# Patient Record
Sex: MALE | Race: WHITE | Employment: FULL TIME | URBAN - METROPOLITAN AREA
[De-identification: names, ages, dates, MRNs, and addresses within clinical notes are randomized per-mention and may not be internally consistent; named-entity substitution may affect disease eponyms.]

---

## 2018-10-12 ENCOUNTER — TRANSFERRED RECORDS (OUTPATIENT)
Dept: HEALTH INFORMATION MANAGEMENT | Facility: CLINIC | Age: 68
End: 2018-10-12

## 2018-10-16 PROBLEM — N18.30 CKD (CHRONIC KIDNEY DISEASE) STAGE 3, GFR 30-59 ML/MIN (H): Status: ACTIVE | Noted: 2018-10-16

## 2018-10-16 PROBLEM — I10 HYPERTENSION GOAL BP (BLOOD PRESSURE) < 140/90: Status: ACTIVE | Noted: 2018-10-16

## 2018-10-16 PROBLEM — C85.90 NON-HODGKIN'S LYMPHOMA (H): Status: ACTIVE | Noted: 2018-10-16

## 2018-10-16 PROBLEM — E11.9 TYPE 2 DIABETES MELLITUS WITHOUT COMPLICATION, WITHOUT LONG-TERM CURRENT USE OF INSULIN (H): Status: ACTIVE | Noted: 2018-10-16

## 2018-10-17 RX ORDER — ALBUTEROL SULFATE 90 UG/1
2 AEROSOL, METERED RESPIRATORY (INHALATION) 4 TIMES DAILY
COMMUNITY
End: 2018-12-04

## 2018-10-17 RX ORDER — EPINEPHRINE 1 MG/ML
INJECTION, SOLUTION INTRAMUSCULAR; SUBCUTANEOUS ONCE
COMMUNITY
End: 2023-04-11

## 2018-10-17 RX ORDER — SILDENAFIL CITRATE 20 MG/1
20 TABLET ORAL
COMMUNITY
End: 2023-08-24

## 2018-10-17 RX ORDER — PRAVASTATIN SODIUM 10 MG
10 TABLET ORAL DAILY
COMMUNITY
End: 2018-12-04

## 2018-12-02 NOTE — PROGRESS NOTES
"SUBJECTIVE:  César Denis is a 67 year old male who presents to the clinic today for a routine physical exam.    The patient's last physical was {NUMBERS 0-12:472577} {MONTHS/YEARS:400883} ago.     No results found for: CHOL  No results found for: HDL  No results found for: LDL  No results found for: TRIG  No results found for: CHOLHDLRATIO  The patient's last fasting lipid panel was done {NUMBERS 1-12:461308} {DAYS:347465} ago and the results {WDSNORMALABNORMALUNKNOWN:947026}***.  The patient has never had his cholesterol checked.***      The ASCVD Risk score (Aileen JC Jr, et al., 2013) failed to calculate for the following reasons:    The systolic blood pressure is missing    Cannot find a previous HDL lab    Cannot find a previous total cholesterol lab    The smoking status is invalid        The patient reports that he {WDSBPHX:212295}    The patient reports that he {does:202593::\"does not\"} take a daily aspirin.    No results found for: HCVAB  The patient reports that he {HAS:516462} been screened for Hepatitis C    (Screen all baby boomers once per CDC-- the generation born from 1945 through 1965)  He {Would:655395} like to have an Hepatitis C test today    No results found for: HIAGAB  The patient reports that he {HAS:982370} been screened for HIV   (Screen all 15 to 64 years old)  He {Would:042699} like to have an HIV test today      Immunization History   Administered Date(s) Administered     HepB-Adult 11/05/2012     Pneumo Conj 13-V (2010&after) 10/25/2017     TDAP Vaccine (Adacel) 07/18/2008     Zoster vaccine, live 09/10/2012     The patient's believes that his last tetanus shot was given {NUMBER:695570} year(s) ago.   The patient believes that he {HAS:099204} had a Shingrix in the past  The patient believes that he {HAS:857438} had a PPSV23 in the past.  The patient believes that he {HAS:207185} had a PCV13 in the past.  The patient believes that he {HAS:969973} had a seasonal flu vaccination this fall " "or winter.  The patient would like to have a {WDSVACCINATIONLIST:266937}      No results found for this or any previous visit.]   The patient {REPORTS/DENIES:411387} a family history of colon cancer.  The patient reports that he {HAS:251719} had a colonoscopy. His  last colonoscopy was in *** and he  report that is was {NORMAL/ABN/NOT EX:719763::\"normal\"}. The patient was told to have this repeated in {NUMBERS 1-10:88029} years.    The patient reports that he {DOES_WM:902894} performs a self testicular exam monthly.  His currently used contraception is ***. He is not*** interested in a vasectomy in the near future.  The patient {REPORTS/DENIES:409495} a family history of diabetes.  The patient {REPORTS/DENIES:724488} a family history of prostate cancer. After discussing the pros and cons of checking a PSA he  {Does / Does not:633071} want to have this test drawn today.   The patient reports that he eats or drinks {NUMBERS 1-12:044660} servings of dairy products per day. He {DOES_WM:595153} take {WDS CALCIUM DOSE:941297} {WDSFREQUENCYDAILY:332363}  The patient reports that he has dental appointments approximately every {NUMBERS 1-12:087444} months.  The patient reports that he  has an eye examination approximately every {NUMBERS .5 - 4.0:75023} year(s).    Do you currently smoke? {Yes/No:033865::\"Yes\"}  How many years have you smoked? ***   How many packs per day did you smoke on average? ***N/A***  (if more than 30 pack year history and the patient is age 55-80 consider ordering an annual low dose radiation lung CT to screen for cancer)  (Do not order if patient has quit more than 15 years ago or has a health condition that limits life expectancy or could not tolerate curative lung surgery)  Are you interested having a lung CT to screen for lung cancer? {YES, NO, NA:441917}    If the patient has smoked more that 100 cigarettes, has the patient had an imaging study (US or CT) for an AAA between the ages of 65 and 75? " "{YES, NO, NA:469760}    *** if 65 or older order fall risk assessment and complete order  ***discuss smoking cessation if appropriate--->add smart phrase to end          Patient Active Problem List   Diagnosis     Type 2 diabetes mellitus without complication, without long-term current use of insulin (H)     Hypertension goal BP (blood pressure) < 140/90     Non-Hodgkin's lymphoma (H)     CKD (chronic kidney disease) stage 3, GFR 30-59 ml/min (H)       No past surgical history on file.    No family history on file.    Social History     Social History     Marital status:      Spouse name: N/A     Number of children: N/A     Years of education: N/A     Occupational History     Not on file.     Social History Main Topics     Smoking status: Not on file     Smokeless tobacco: Not on file     Alcohol use Not on file     Drug use: Not on file     Sexual activity: Not on file     Other Topics Concern     Not on file     Social History Narrative       Current Outpatient Prescriptions   Medication Sig Dispense Refill     albuterol (PROAIR HFA/PROVENTIL HFA/VENTOLIN HFA) 108 (90 Base) MCG/ACT inhaler Inhale 2 puffs into the lungs 4 times daily       EPINEPHrine, Anaphylaxis, (ADRENALIN) 1 MG/ML injection Inject Subcutaneous once       LISINOPRIL PO Take 10 mg by mouth daily       METFORMIN HCL PO Take 1,000 mg by mouth 2 times daily (with meals) Take 1/2 tablet twice daily       pravastatin (PRAVACHOL) 10 MG tablet Take 10 mg by mouth daily       sildenafil (REVATIO) 20 MG tablet Take 20 mg by mouth 3-5 tablets once daily as needed 30-60 mins before needed for ED         Review Of Systems  Skin: {Skin:100:a:\"negative\"}  Eyes: {Eyes:200:a:\"negative\"}  Ears/Nose/Throat: {ENT:300:a:\"negative\"}  Respiratory: {Resp:400::\"negative\"}  Cardiovascular: {CV:500::\"negative\"}  Gastrointestinal: {GI:600::\"negative\"}  Genitourinary: {:700::\"negative\"}  Musculoskeletal: {Musc:800::\"negative\"}  Neurologic: " "{Neur:900::\"negative\"}  Psychiatric: {Psych:1000::\"negative\"}  Hematologic/Lymphatic/Immunologic: {Hem:1100::\"negative\"}  Endocrine: {Endo:1200::\"negative\"}    PHYSICAL EXAMINATION:  There were no vitals taken for this visit.  General appearance - {appearance:50::\"healthy\",\"alert\",\"no distress\"}  Skin - {skin:101::\"Skin color, texture, turgor normal. No rashes or lesions.\"}  Head - {head:301::\"Normocephalic. No masses, lesions, tenderness or abnormalities\"}  Eyes - {eyes:201::\"conjunctivae/corneas clear. PERRL, EOM's intact. Fundi benign\"}  Ears - {ears:322::\"External ears normal. Canals clear. TM's normal.\"}  Nose/Sinuses - {nose:302::\"Nares normal. Septum midline. Mucosa normal. No drainage or sinus tenderness.\"}  Oropharynx - {O/P:303::\"Lips, mucosa, and tongue normal. Teeth and gums normal.\"}  Neck - {neck:304::\"Neck supple. No adenopathy. Thyroid symmetric, normal size,\"}  Lungs - {lungs:401::\"Percussion normal. Good diaphragmatic excursion. Lungs clear\"}  Heart - {heart:501::\"PMI normal. No lifts, heaves, or thrills. RRR. No murmurs, clicks gallops or rub\"}  Abdomen - {abdomen:601::\"Abdomen soft, non-tender. BS normal. No masses, organomegaly\"}  Extremities - {extremities:802::\"Extremities normal. No deformities, edema, or skin discoloration.\"}  Musculoskeletal - {musculoskeletal:803::\"Spine ROM normal. Muscular strength intact.\"}  Peripheral pulses - {pulses:502::\"radial=4/4, femoral=4/4, popliteal=4/4, dorsalis pedis=4/4,\"}  Neuro - {neuro:901::\"Gait normal. Reflexes normal and symmetric. Sensation grossly WNL.\"}  Genitalia - {genitalia:708::\"Penis normal. No urethral discharge. Scrotum normal to palpation. No hernia.\"}  Rectal - {rectal:613::\"Rectal negative. Prostate palpation negative.  No rectal masses or abnormalities\"}      No results found for any previous visit.    ASSESSMENT:    ICD-10-CM    1. Medicare annual wellness visit, subsequent Z00.00    2. Screen for colon cancer Z12.11    3. Need for " hepatitis C screening test Z11.59    4. Screening for diabetic peripheral neuropathy Z13.89 FOOT EXAM  NO CHARGE [38059.114]       Well-Adult Physical Exam.  Health Maintenance Due   Topic Date Due     FOOT EXAM Q1 YEAR  12/22/1951     EYE EXAM Q1 YEAR  12/22/1951     CREATININE Q1 YEAR  12/22/1952     TSH W/ FREE T4 REFLEX Q2 YEAR  12/22/1952     BMP Q1 YR  12/22/1952     HEMOGLOBIN Q1 YR  12/22/1952     A1C Q6 MO  12/22/1952     LIPID MONITORING Q1 YEAR  12/22/1952     MICROALBUMIN Q1 YEAR  12/22/1952     PHQ-2 Q1 YR  12/22/1963     HEPATITIS C SCREENING  12/22/1969     COLON CANCER SCREEN (SYSTEM ASSIGNED)  12/22/2001     ADVANCE DIRECTIVE PLANNING Q5 YRS  12/22/2006     FALL RISK ASSESSMENT  12/22/2016     AORTIC ANEURYSM SCREENING (SYSTEM ASSIGNED)  12/22/2016     TETANUS IMMUNIZATION (SYSTEM ASSIGNED)  07/18/2018     INFLUENZA VACCINE (1) 09/01/2018     PNEUMOCOCCAL (2 of 2 - PPSV23) 10/25/2018     Health Maintenance   Topic Date Due     FOOT EXAM Q1 YEAR  12/22/1951     EYE EXAM Q1 YEAR  12/22/1952     CREATININE Q1 YEAR  12/22/1952     TSH W/ FREE T4 REFLEX Q2 YEAR  12/22/1952     BMP Q1 YR  12/22/1952     HEMOGLOBIN Q1 YR  12/22/1952     A1C Q6 MO  12/22/1952     LIPID MONITORING Q1 YEAR  12/22/1952     MICROALBUMIN Q1 YEAR  12/22/1952     PHQ-2 Q1 YR  12/22/1963     HEPATITIS C SCREENING  12/22/1969     COLON CANCER SCREEN (SYSTEM ASSIGNED)  12/22/2001     ADVANCE DIRECTIVE PLANNING Q5 YRS  12/22/2006     FALL RISK ASSESSMENT  12/22/2016     AORTIC ANEURYSM SCREENING (SYSTEM ASSIGNED)  12/22/2016     TETANUS IMMUNIZATION (SYSTEM ASSIGNED)  07/18/2018     INFLUENZA VACCINE (1) 09/01/2018     PNEUMOCOCCAL (2 of 2 - PPSV23) 10/25/2018         HEALTH CARE MAINTENENCE: The recommended screening tests and vaccinatons for this patient have been discussed as above.  The appropriate tests and vaccinations  have been ordered or declined by the patient. Please see the orders in EPIC.The patient specifically declines:  "n/a ***    Immunization Status:  {:5306::\"up to date and documented\"} except for ***    Patient Active Problem List   Diagnosis     Type 2 diabetes mellitus without complication, without long-term current use of insulin (H)     Hypertension goal BP (blood pressure) < 140/90     Non-Hodgkin's lymphoma (H)     CKD (chronic kidney disease) stage 3, GFR 30-59 ml/min (H)    ***    ATP III Guidelines  ICSI Preventive Guidelines    PLAN:   {PHYSICAL EXAM GUIDANCE PLANS:194602::\"Check a fasting lipid profile\",\"Discussed calcium intake, vitamins and supplements. Recommended 1000*** mg of calcium daily\",\"Sunscreen use was recommended especially in the area of tatoos\",\"Recommended dental exams every 6 months\",\"Follow up in 1 year for the next preventative medical visit\"}    The patients chronic medication was filled for 6***12 *** months.  The patient reported that he did not need any refills at this time.***    There is no height or weight on file to calculate BMI.    {Obesity Action Plan -- Delete if BMA < 25:019321}  {Tobacco Cessation -- Delete if patient is a non-smoker:070686}      "

## 2018-12-03 ENCOUNTER — OFFICE VISIT (OUTPATIENT)
Dept: FAMILY MEDICINE | Facility: CLINIC | Age: 68
End: 2018-12-03
Payer: COMMERCIAL

## 2018-12-03 VITALS
RESPIRATION RATE: 20 BRPM | OXYGEN SATURATION: 97 % | SYSTOLIC BLOOD PRESSURE: 115 MMHG | WEIGHT: 184 LBS | HEART RATE: 78 BPM | TEMPERATURE: 98.2 F | BODY MASS INDEX: 27.89 KG/M2 | HEIGHT: 68 IN | DIASTOLIC BLOOD PRESSURE: 72 MMHG

## 2018-12-03 DIAGNOSIS — N18.30 CKD (CHRONIC KIDNEY DISEASE) STAGE 3, GFR 30-59 ML/MIN (H): ICD-10-CM

## 2018-12-03 DIAGNOSIS — Z00.00 MEDICARE ANNUAL WELLNESS VISIT, SUBSEQUENT: Primary | ICD-10-CM

## 2018-12-03 DIAGNOSIS — I10 HYPERTENSION GOAL BP (BLOOD PRESSURE) < 140/90: ICD-10-CM

## 2018-12-03 DIAGNOSIS — Z11.59 NEED FOR HEPATITIS C SCREENING TEST: ICD-10-CM

## 2018-12-03 DIAGNOSIS — Z12.11 SCREEN FOR COLON CANCER: ICD-10-CM

## 2018-12-03 DIAGNOSIS — N52.8 OTHER MALE ERECTILE DYSFUNCTION: ICD-10-CM

## 2018-12-03 DIAGNOSIS — C83.98 NON-FOLLICULAR LYMPHOMA OF LYMPH NODES OF MULTIPLE REGIONS, UNSPECIFIED NON-FOLLICULAR LYMPHOMA TYPE (H): ICD-10-CM

## 2018-12-03 DIAGNOSIS — Z13.89 SCREENING FOR DIABETIC PERIPHERAL NEUROPATHY: ICD-10-CM

## 2018-12-03 DIAGNOSIS — E11.9 TYPE 2 DIABETES MELLITUS WITHOUT COMPLICATION, WITHOUT LONG-TERM CURRENT USE OF INSULIN (H): ICD-10-CM

## 2018-12-03 LAB
ALBUMIN SERPL-MCNC: 4.2 G/DL (ref 3.4–5)
ALP SERPL-CCNC: 72 U/L (ref 40–150)
ALT SERPL W P-5'-P-CCNC: 31 U/L (ref 0–70)
ANION GAP SERPL CALCULATED.3IONS-SCNC: 9 MMOL/L (ref 3–14)
AST SERPL W P-5'-P-CCNC: 20 U/L (ref 0–45)
BILIRUB SERPL-MCNC: 0.5 MG/DL (ref 0.2–1.3)
BUN SERPL-MCNC: 17 MG/DL (ref 7–30)
CALCIUM SERPL-MCNC: 9.3 MG/DL (ref 8.5–10.1)
CHLORIDE SERPL-SCNC: 104 MMOL/L (ref 94–109)
CHOLEST SERPL-MCNC: 222 MG/DL
CO2 SERPL-SCNC: 25 MMOL/L (ref 20–32)
CREAT SERPL-MCNC: 1.31 MG/DL (ref 0.66–1.25)
CREAT UR-MCNC: 105 MG/DL
GFR SERPL CREATININE-BSD FRML MDRD: 55 ML/MIN/1.7M2
GLUCOSE SERPL-MCNC: 106 MG/DL (ref 70–99)
HBA1C MFR BLD: 5.9 % (ref 0–5.6)
HCV AB SERPL QL IA: NONREACTIVE
HDLC SERPL-MCNC: 42 MG/DL
HGB BLD-MCNC: 16.7 G/DL (ref 13.3–17.7)
LDLC SERPL CALC-MCNC: 138 MG/DL
MICROALBUMIN UR-MCNC: <5 MG/L
MICROALBUMIN/CREAT UR: NORMAL MG/G CR (ref 0–17)
NONHDLC SERPL-MCNC: 180 MG/DL
POTASSIUM SERPL-SCNC: 4.1 MMOL/L (ref 3.4–5.3)
PROT SERPL-MCNC: 8.5 G/DL (ref 6.8–8.8)
PTH-INTACT SERPL-MCNC: 24 PG/ML (ref 18–80)
SODIUM SERPL-SCNC: 138 MMOL/L (ref 133–144)
TRIGL SERPL-MCNC: 210 MG/DL
TSH SERPL DL<=0.005 MIU/L-ACNC: 1.69 MU/L (ref 0.4–4)

## 2018-12-03 PROCEDURE — 83970 ASSAY OF PARATHORMONE: CPT | Performed by: FAMILY MEDICINE

## 2018-12-03 PROCEDURE — 83036 HEMOGLOBIN GLYCOSYLATED A1C: CPT | Performed by: FAMILY MEDICINE

## 2018-12-03 PROCEDURE — 80061 LIPID PANEL: CPT | Performed by: FAMILY MEDICINE

## 2018-12-03 PROCEDURE — 85018 HEMOGLOBIN: CPT | Performed by: FAMILY MEDICINE

## 2018-12-03 PROCEDURE — 80053 COMPREHEN METABOLIC PANEL: CPT | Performed by: FAMILY MEDICINE

## 2018-12-03 PROCEDURE — 86803 HEPATITIS C AB TEST: CPT | Performed by: FAMILY MEDICINE

## 2018-12-03 PROCEDURE — 36415 COLL VENOUS BLD VENIPUNCTURE: CPT | Performed by: FAMILY MEDICINE

## 2018-12-03 PROCEDURE — 99397 PER PM REEVAL EST PAT 65+ YR: CPT | Performed by: FAMILY MEDICINE

## 2018-12-03 PROCEDURE — 82043 UR ALBUMIN QUANTITATIVE: CPT | Performed by: FAMILY MEDICINE

## 2018-12-03 PROCEDURE — 84443 ASSAY THYROID STIM HORMONE: CPT | Performed by: FAMILY MEDICINE

## 2018-12-03 ASSESSMENT — ENCOUNTER SYMPTOMS
ARTHRALGIAS: 1
SHORTNESS OF BREATH: 0
DYSURIA: 0
FEVER: 0
HEADACHES: 0
JOINT SWELLING: 0
NERVOUS/ANXIOUS: 0
WEAKNESS: 0
SORE THROAT: 0
HEMATOCHEZIA: 0
NAUSEA: 0
EYE PAIN: 0
COUGH: 0
HEARTBURN: 1
PARESTHESIAS: 0
ABDOMINAL PAIN: 0
MYALGIAS: 0
DIZZINESS: 0
HEMATURIA: 0
CONSTIPATION: 0
FREQUENCY: 0
CHILLS: 0
DIARRHEA: 0
PALPITATIONS: 0

## 2018-12-03 ASSESSMENT — ACTIVITIES OF DAILY LIVING (ADL): CURRENT_FUNCTION: NO ASSISTANCE NEEDED

## 2018-12-03 ASSESSMENT — PAIN SCALES - GENERAL: PAINLEVEL: NO PAIN (0)

## 2018-12-03 NOTE — MR AVS SNAPSHOT
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   o   Discuss preventive care.    o   Review your medicines if your doctor has prescribed any.    Talk with your health care provider about whether you should have a test to screen for prostate cancer (PSA).    Every 3 years, have a diabetes test (fasting glucose). If you are at risk for diabetes, you should have this test more often.    Every 5 years, have a cholesterol test. Have this test more often if you are at risk for high cholesterol or heart disease.     Every 10 years, have a colonoscopy. Or, have a yearly FIT test (stool test). These exams will check for colon cancer.    Talk to with your health care provider about screening for Abdominal Aortic Aneurysm if you have a family history of AAA or have a history of smoking.  Shots:     Get a flu shot each year.     Get a tetanus shot every 10 years.     Talk to your doctor about your pneumonia vaccines. There are now two you should receive - Pneumovax (PPSV 23) and  "Prevnar (PCV 13).    Talk to your pharmacist about a shingles vaccine.     Talk to your doctor about the hepatitis B vaccine.  Nutrition:     Eat at least 5 servings of fruits and vegetables each day.     Eat whole-grain bread, whole-wheat pasta and brown rice instead of white grains and rice.     Get adequate Calcium and Vitamin D.   Lifestyle    Exercise for at least 150 minutes a week (30 minutes a day, 5 days a week). This will help you control your weight and prevent disease.     Limit alcohol to one drink per day.     No smoking.     Wear sunscreen to prevent skin cancer.     See your dentist every six months for an exam and cleaning.     See your eye doctor every 1 to 2 years to screen for conditions such as glaucoma, macular degeneration and cataracts.          Follow-ups after your visit        Follow-up notes from your care team     Return in about 3 months (around 3/3/2019) for Diabetes Recheck and vaccine update.      Who to contact     If you have questions or need follow up information about today's clinic visit or your schedule please contact [facility] directly at [phone].  Normal or non-critical lab and imaging results will be communicated to you by [employer]BO[employer], letter or phone within 4 business days after the clinic has received the results. If you do not hear from us within 7 days, please contact the clinic through [employer] or phone. If you have a critical or abnormal lab result, we will notify you by phone as soon as possible.  Submit refill requests through [employer] or call your pharmacy and they will forward the refill request to us. Please allow 3 business days for your refill to be completed.          Additional Information About Your Visit        Data [employer] Information     Two Tap lets you send messages to your doctor, view your test results, renew your prescriptions, schedule appointments and more. To sign up, go to [email or web address] . Click on \"Log in\" on the left side of " "the screen, which will take you to the Welcome page. Then click on \"Sign up Now\" on the right side of the page.     You will be asked to enter the access code listed below, as well as some personal information. Please follow the directions to create your username and password.     Your access code is: [email or web address]  Expires: 3/3/2019  9:43 AM     Your access code will  in 90 days. If you need help or a new code, please call your [place] clinic or [phone].        Care EveryWhere ID     This is your Care EveryWhere ID. This could be used by other organizations to access your [place] medical records  [id number]        Your Vitals Were     Pulse Temperature Respirations Height Pulse Oximetry BMI (Body Mass Index)    78 98.2  F (36.8  C) (Oral) 20 5' 7.5\" (1.715 m) 97% 28.39 kg/m2       Blood Pressure from Last 3 Encounters:   18 115/72    Weight from Last 3 Encounters:   18 184 lb (83.5 kg)              We Performed the Following     Albumin Random Urine Quantitative with Creat Ratio     Comprehensive metabolic panel     HEMOGLOBIN A1C     Hemoglobin     Hepatitis C Screen Reflex to HCV RNA Quant and Genotype     JUST IN CASE     Lipid panel reflex to direct LDL Fasting     Parathyroid Hormone Intact     TSH WITH FREE T4 REFLEX        Primary Care Provider Office Phone # Fax #    [facility] [phone] [address] [facility] [address]        Equal Access to Services     JOSIAH CHAN AH: Hadii yazmin muñozo Soisaíasali, waaxda luqadaha, qaybta kaalmada aderosayada, genet daley. So [facility] [phone].    ATENCIÓN: Si habla español, tiene a ybarra disposición servicios gratuitos de asistencia lingüística. Llame al [phone].    We comply with applicable federal civil rights laws and [place] laws. We do not discriminate on the basis of race, color, national origin, age, disability, sex, sexual orientation, or gender identity.          "   Thank you!     Thank you for choosing [facility]  for your care. Our goal is always to provide you with excellent care. Hearing back from our patients is one way we can continue to improve our services. Please take a few minutes to complete the written survey that you may receive in the mail after your visit with us. Thank you!             Your Updated Medication List - Protect others around you: Learn how to safely use, store and throw away your medicines at www.disposemymeds.org.          This list is accurate as of 12/3/18  9:45 AM.  Always use your most recent med list.                   Brand Name Dispense Instructions for use Diagnosis    albuterol 108 (90 Base) MCG/ACT inhaler    PROAIR HFA/PROVENTIL HFA/VENTOLIN HFA     Inhale 2 puffs into the lungs 4 times daily        EPINEPHrine (Anaphylaxis) 1 MG/ML injection    ADRENALIN     Inject Subcutaneous once        LISINOPRIL PO      Take 10 mg by mouth daily        METFORMIN HCL PO      Take 1,000 mg by mouth 2 times daily (with meals) Take 1/2 tablet twice daily        pravastatin 10 MG tablet    PRAVACHOL     Take 10 mg by mouth daily        sildenafil 20 MG tablet    REVATIO     Take 20 mg by mouth 3-5 tablets once daily as needed 30-60 mins before needed for ED

## 2018-12-04 ENCOUNTER — MYC REFILL (OUTPATIENT)
Dept: FAMILY MEDICINE | Facility: CLINIC | Age: 68
End: 2018-12-04

## 2018-12-04 ENCOUNTER — MYC MEDICAL ADVICE (OUTPATIENT)
Dept: FAMILY MEDICINE | Facility: CLINIC | Age: 68
End: 2018-12-04

## 2018-12-04 DIAGNOSIS — E11.9 TYPE 2 DIABETES MELLITUS WITHOUT COMPLICATION, WITHOUT LONG-TERM CURRENT USE OF INSULIN (H): ICD-10-CM

## 2018-12-04 DIAGNOSIS — R06.2 WHEEZING: Primary | ICD-10-CM

## 2018-12-04 DIAGNOSIS — E78.00 ELEVATED LDL CHOLESTEROL LEVEL: ICD-10-CM

## 2018-12-04 DIAGNOSIS — N18.30 CKD (CHRONIC KIDNEY DISEASE) STAGE 3, GFR 30-59 ML/MIN (H): ICD-10-CM

## 2018-12-04 DIAGNOSIS — R06.2 WHEEZING: ICD-10-CM

## 2018-12-04 DIAGNOSIS — I10 HYPERTENSION GOAL BP (BLOOD PRESSURE) < 140/90: ICD-10-CM

## 2018-12-04 RX ORDER — ALBUTEROL SULFATE 90 UG/1
2 AEROSOL, METERED RESPIRATORY (INHALATION) 4 TIMES DAILY
Qty: 3 INHALER | Refills: 3 | Status: SHIPPED | OUTPATIENT
Start: 2018-12-04 | End: 2020-02-27

## 2018-12-04 RX ORDER — LISINOPRIL 10 MG/1
10 TABLET ORAL DAILY
Qty: 90 TABLET | Refills: 1 | Status: CANCELLED | OUTPATIENT
Start: 2018-12-04

## 2018-12-04 RX ORDER — ALBUTEROL SULFATE 90 UG/1
2 AEROSOL, METERED RESPIRATORY (INHALATION) 4 TIMES DAILY
Qty: 3 INHALER | Refills: 3 | Status: CANCELLED | OUTPATIENT
Start: 2018-12-04

## 2018-12-04 RX ORDER — LISINOPRIL 10 MG/1
10 TABLET ORAL DAILY
Qty: 90 TABLET | Refills: 1 | Status: SHIPPED | OUTPATIENT
Start: 2018-12-04 | End: 2019-06-14

## 2018-12-04 RX ORDER — METFORMIN HCL 500 MG
500 TABLET, EXTENDED RELEASE 24 HR ORAL
Qty: 90 TABLET | Refills: 1 | Status: SHIPPED | OUTPATIENT
Start: 2018-12-04 | End: 2019-07-25

## 2018-12-04 RX ORDER — PRAVASTATIN SODIUM 20 MG
20 TABLET ORAL AT BEDTIME
Qty: 90 TABLET | Refills: 3 | Status: SHIPPED | OUTPATIENT
Start: 2018-12-04 | End: 2019-12-16

## 2018-12-04 RX ORDER — PRAVASTATIN SODIUM 20 MG
20 TABLET ORAL AT BEDTIME
Qty: 90 TABLET | Refills: 3 | Status: CANCELLED | OUTPATIENT
Start: 2018-12-04

## 2018-12-04 RX ORDER — METFORMIN HCL 500 MG
500 TABLET, EXTENDED RELEASE 24 HR ORAL
Qty: 90 TABLET | Refills: 1 | Status: CANCELLED | OUTPATIENT
Start: 2018-12-04

## 2018-12-04 NOTE — PROGRESS NOTES
César,  I have reviewed the results of the laboratory tests that we recently ordered. All of the lab work performed was normal or considered normal for you except the cholesterol. Your LDL is higher than I would like it and your triglycerides are a little high. If you are willing to try a higher dose of the pravachol I would recommend that. Let me know via [email or web address] what pharmacy I can call that into. I would recommend(ed) a 3 month(s) follow up for diabetes.  Sincerely,   Ronnie Mckeon

## 2018-12-04 NOTE — TELEPHONE ENCOUNTER
Patient seen in the last 30 days and medications were not refilled.  RN pended medications as patient ordered below.    Also, patient is responding to providers question about increasing pravachol dose:    César,  I have reviewed the results of the laboratory tests that we recently ordered. All of the lab work performed was normal or considered normal for you except the cholesterol. Your LDL is higher than I would like it and your triglycerides are a little high. If you are willing to try a higher dose of the pravachol I would recommend that. Let me know via [employer] what pharmacy I can call that into. I would recommend(ed) a 3 month(s) follow up for diabetes.  Sincerely,   Ronnie Mckeon          Routing to provider to advise.  Katerin Avina, AMRITN RN

## 2018-12-04 NOTE — TELEPHONE ENCOUNTER
Message from MyChart:  Original authorizing provider: Ronnie Mckeon MD    César Denis would like a refill of the following medications:  lisinopril (PRINIVIL/ZESTRIL) 10 MG tablet [Ronnie Mckeon MD]  metFORMIN (GLUCOPHAGE-XR) 500 MG 24 hr tablet [Ronnie Mckeon MD]  albuterol (PROAIR HFA/PROVENTIL HFA/VENTOLIN HFA) 108 (90 Base) MCG/ACT inhaler [Ronnie Mckeon MD]  pravastatin (PRAVACHOL) 20 MG tablet [Ronnie Mckeon MD]    Preferred pharmacy: [facility] [address] [place] [address] [address] [place] [address] [place]    Comment:

## 2019-01-29 ENCOUNTER — TELEPHONE (OUTPATIENT)
Dept: FAMILY MEDICINE | Facility: CLINIC | Age: 69
End: 2019-01-29

## 2019-01-29 NOTE — TELEPHONE ENCOUNTER
Panel Management Review      Patient has the following on his problem list:     Diabetes    ASA: Failed    Last A1C  Lab Results   Component Value Date    A1C 5.9 12/03/2018     A1C tested: Passed    Last LDL:    Lab Results   Component Value Date    CHOL 222 12/03/2018     Lab Results   Component Value Date    HDL 42 12/03/2018     Lab Results   Component Value Date     12/03/2018     Lab Results   Component Value Date    TRIG 210 12/03/2018     No results found for: CHOLHDLRATIO  Lab Results   Component Value Date    NHDL 180 12/03/2018       Is the patient on a Statin? YES             Is the patient on Aspirin? NO    Medications     HMG CoA Reductase Inhibitors    pravastatin (PRAVACHOL) 20 MG tablet          Last three blood pressure readings:  BP Readings from Last 3 Encounters:   12/03/18 115/72       Date of last diabetes office visit: unsure, physical 12/3/18     Tobacco History:     History   Smoking Status     Never Smoker   Smokeless Tobacco     Never Used           Composite cancer screening  Chart review shows that this patient is due/due soon for the following Colonoscopy  Summary:    Patient is due/failing the following:   Diabetic check and COLONOSCOPY    Action needed:   Per last office visit os to follow up for Diabetic check 3/3/19 will dicuss Colonoscopy at that time    Type of outreach:    none    Questions for provider review:    None                                                                                                                                    Carmela Thacker cma       Chart routed to closed .

## 2019-04-08 ENCOUNTER — OFFICE VISIT (OUTPATIENT)
Dept: FAMILY MEDICINE | Facility: CLINIC | Age: 69
End: 2019-04-08
Payer: COMMERCIAL

## 2019-04-08 VITALS
TEMPERATURE: 98.6 F | OXYGEN SATURATION: 96 % | BODY MASS INDEX: 29.66 KG/M2 | HEIGHT: 67 IN | SYSTOLIC BLOOD PRESSURE: 127 MMHG | WEIGHT: 189 LBS | HEART RATE: 89 BPM | RESPIRATION RATE: 18 BRPM | DIASTOLIC BLOOD PRESSURE: 78 MMHG

## 2019-04-08 DIAGNOSIS — Z13.89 SCREENING FOR DIABETIC PERIPHERAL NEUROPATHY: Primary | ICD-10-CM

## 2019-04-08 DIAGNOSIS — E11.9 TYPE 2 DIABETES MELLITUS WITHOUT COMPLICATION, WITHOUT LONG-TERM CURRENT USE OF INSULIN (H): ICD-10-CM

## 2019-04-08 DIAGNOSIS — I10 HYPERTENSION GOAL BP (BLOOD PRESSURE) < 140/90: ICD-10-CM

## 2019-04-08 DIAGNOSIS — Z23 NEED FOR VACCINATION: ICD-10-CM

## 2019-04-08 DIAGNOSIS — E78.00 ELEVATED LDL CHOLESTEROL LEVEL: ICD-10-CM

## 2019-04-08 PROCEDURE — 90472 IMMUNIZATION ADMIN EACH ADD: CPT | Performed by: FAMILY MEDICINE

## 2019-04-08 PROCEDURE — 90471 IMMUNIZATION ADMIN: CPT | Performed by: FAMILY MEDICINE

## 2019-04-08 PROCEDURE — 90746 HEPB VACCINE 3 DOSE ADULT IM: CPT | Performed by: FAMILY MEDICINE

## 2019-04-08 PROCEDURE — 90714 TD VACC NO PRESV 7 YRS+ IM: CPT | Performed by: FAMILY MEDICINE

## 2019-04-08 PROCEDURE — 99214 OFFICE O/P EST MOD 30 MIN: CPT | Mod: 25 | Performed by: FAMILY MEDICINE

## 2019-04-08 PROCEDURE — 99207 C FOOT EXAM  NO CHARGE: CPT | Performed by: FAMILY MEDICINE

## 2019-04-08 RX ORDER — CETIRIZINE HYDROCHLORIDE 10 MG/1
10 TABLET, CHEWABLE ORAL DAILY
COMMUNITY

## 2019-04-08 RX ORDER — FLUTICASONE PROPIONATE 50 MCG
1 SPRAY, SUSPENSION (ML) NASAL DAILY
COMMUNITY

## 2019-04-08 ASSESSMENT — MIFFLIN-ST. JEOR: SCORE: 1590.93

## 2019-04-08 ASSESSMENT — PAIN SCALES - GENERAL: PAINLEVEL: NO PAIN (0)

## 2019-04-08 NOTE — NURSING NOTE
"Chief Complaint   Patient presents with     Diabetes     Health Maintenance     adv dir, PHQ-2       Initial /78   Pulse 89   Temp 98.6  F (37  C) (Oral)   Resp 18   Ht 1.702 m (5' 7\")   Wt 85.7 kg (189 lb)   SpO2 96%   BMI 29.60 kg/m   Estimated body mass index is 29.6 kg/m  as calculated from the following:    Height as of this encounter: 1.702 m (5' 7\").    Weight as of this encounter: 85.7 kg (189 lb).  Medication Reconciliation: complete  Carmela Thacker, REGINA  "

## 2019-04-08 NOTE — PATIENT INSTRUCTIONS
Please schedule and eye exam with you eye care provider and continue to do so every 1 year(s). Dr Jung is here at the clinic        Please schedule a future laboratory appointment(s) and be sure to have fasted for 10 hours prior to arrival

## 2019-04-08 NOTE — PROGRESS NOTES
[place] diabetes resourses:  [email or web address]        To access diabetes educational materials with in EPIC use <dot>TAMMIE      Put this in AFTER VISIT SUMMARY if needed for the patient to access information online [email or web address]      SUBJECTIVE:  César Denis is a 68 year old male who presents today for a follow up appointment for management of DIABETES MELLITUS.    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, unspecified non-follicular lymphoma type (H) 12/03/2018     Priority: Medium     Other male erectile dysfunction 12/03/2018     Priority: Medium     Type 2 diabetes mellitus without complication, without long-term current use of insulin (H) 10/16/2018     Priority: Medium     Hypertension goal BP (blood pressure) < 140/90 10/16/2018     Priority: Medium     Non-Hodgkin's lymphoma (H) 10/16/2018     Priority: Medium     CKD (chronic kidney disease) stage 3, GFR 30-59 ml/min (H) 10/16/2018     Priority: Medium          The patient checks his blood sugar as follows: 1 times a month(s), once daily.   Results as follows: fasting glucose- 115-120    The patient reports that he IS taking the medication as prescribed. He denies side effects of medication.    ----------------------------------------------------------------------------------------------------------------------------------------    BP Readings from Last 3 Encounters:   04/08/19 127/78   12/03/18 115/72     The patient reports that he IS NOT currently smoking.  History   Smoking Status     Never Smoker   Smokeless Tobacco     Never Used         The 10-year ASCVD risk score (Aileen GREENE Jr., et al., 2013) is: 34.2%    Values used to calculate the score:      Age: 68 years      Sex: Male      Is Non- : No      Diabetic: Yes      Tobacco smoker: No      Systolic  Blood Pressure: 127 mmHg      Is BP treated: Yes      HDL Cholesterol: 42 mg/dL      Total Cholesterol: 222 mg/dL        Current Outpatient Medications   Medication Sig Dispense Refill     albuterol (PROAIR HFA/PROVENTIL HFA/VENTOLIN HFA) 108 (90 Base) MCG/ACT inhaler Inhale 2 puffs into the lungs 4 times daily 3 Inhaler 3     cetirizine (ZYRTEC) 10 MG CHEW Take 10 mg by mouth daily       EPINEPHrine, Anaphylaxis, (ADRENALIN) 1 MG/ML injection Inject Subcutaneous once       fluticasone (FLONASE) 50 MCG/ACT nasal spray Spray 1 spray into both nostrils daily       lisinopril (PRINIVIL/ZESTRIL) 10 MG tablet Take 1 tablet (10 mg) by mouth daily 90 tablet 1     metFORMIN (GLUCOPHAGE-XR) 500 MG 24 hr tablet Take 1 tablet (500 mg) by mouth daily (with dinner) 90 tablet 1     METFORMIN HCL PO Take 1,000 mg by mouth 2 times daily (with meals) Take 1/2 tablet twice daily       pravastatin (PRAVACHOL) 20 MG tablet Take 1 tablet (20 mg) by mouth At Bedtime 90 tablet 3     sildenafil (REVATIO) 20 MG tablet Take 20 mg by mouth 3-5 tablets once daily as needed 30-60 mins before needed for ED         The patient reports that he IS taking a statin.   (Reminder all diabetics with a ASCVD risk greater or equal to 7.5% should be on a high intensity statin, otherwise on a moderate intensity statin)      The patient reports that he IS not taking aspirin as he is allergic to it.       The patient reports that he IS doing a self foot exam daily.  The patient reports that he does exercise in the form of walking the dog 7 times a week.   The patient reports that he IS following the recommended diabetic diet. He  would give himself a C grade on his diet.  The patient reports that his last eye exam was 3 years ago.         Immunization History   Administered Date(s) Administered     HepB-Adult 11/05/2012     Influenza (High Dose) 3 valent vaccine 10/24/2018     Pneumo Conj 13-V (2010&after) 10/25/2017     Pneumococcal 23 valent 09/01/2009,  "12/03/2018     TDAP Vaccine (Adacel) 07/18/2008     Td (Adult), Adsorbed 06/18/2004     Zoster vaccine, live 09/10/2012     The patient reports that he has started the hepatitis B vaccine series in the past. (recommended for age 19-59 and can be given to age 60 or older)  The patient reports that he has had a pnuemovax in the past.  The patient reports that he has had a flu shot for the current influenza season.  The patient would like to have a Hepatitis B and Td    Patient concerns: is concerned about the Shingrix vaccine and will talk to his oncology         EXAM:  /78   Pulse 89   Temp 98.6  F (37  C) (Oral)   Resp 18   Ht 1.702 m (5' 7\")   Wt 85.7 kg (189 lb)   SpO2 96%   BMI 29.60 kg/m    Wt Readings from Last 4 Encounters:   04/08/19 85.7 kg (189 lb)   12/03/18 83.5 kg (184 lb)     Body mass index is 29.6 kg/m .    General:  César is awake, alert, and cooperative.  NAD.      Diabetic Foot Screen:  Any complaints of increased pain or numbness ? No  Is there a foot ulcer now or a history of foot ulcer? No  Does the foot have an abnormal shape? No  Are the nails thick, too long or ingrown? No  Are there any redness or open areas? No         Sensation Testing done at all points on the diagram with monofilament     Right Foot: Sensation Normal at all points  Left Foot: Sensation Normal at all points     Risk Category: 0- No loss of protective sensation  Performed by Ronnie Mckeon MD                  Previsit labs drawn include:  Office Visit on 12/03/2018   Component Date Value Ref Range Status     Hepatitis C Antibody 12/03/2018 Nonreactive  NR^Nonreactive Final    Comment: Assay performance characteristics have not been established for newborns,   infants, and children       Hemoglobin A1C 12/03/2018 5.9* 0 - 5.6 % Final    Comment: Normal <5.7% Prediabetes 5.7-6.4%  Diabetes 6.5% or higher - adopted from ADA   consensus guidelines.       Cholesterol 12/03/2018 222* <200 mg/dL Final    " Desirable:       <200 mg/dl     Triglycerides 12/03/2018 210* <150 mg/dL Final    Comment: Borderline high:  150-199 mg/dl  High:             200-499 mg/dl  Very high:       >499 mg/dl  Fasting specimen       HDL Cholesterol 12/03/2018 42  >39 mg/dL Final     LDL Cholesterol Calculated 12/03/2018 138* <100 mg/dL Final    Comment: Above desirable:  100-129 mg/dl  Borderline High:  130-159 mg/dL  High:             160-189 mg/dL  Very high:       >189 mg/dl       Non HDL Cholesterol 12/03/2018 180* <130 mg/dL Final    Comment: Above Desirable:  130-159 mg/dl  Borderline high:  160-189 mg/dl  High:             190-219 mg/dl  Very high:       >219 mg/dl       Creatinine Urine 12/03/2018 105  mg/dL Final     Albumin Urine mg/L 12/03/2018 <5  mg/L Final     Albumin Urine mg/g Cr 12/03/2018 Unable to calculate due to low value  0 - 17 mg/g Cr Final     TSH 12/03/2018 1.69  0.40 - 4.00 mU/L Final     Sodium 12/03/2018 138  133 - 144 mmol/L Final     Potassium 12/03/2018 4.1  3.4 - 5.3 mmol/L Final     Chloride 12/03/2018 104  94 - 109 mmol/L Final     Carbon Dioxide 12/03/2018 25  20 - 32 mmol/L Final     Anion Gap 12/03/2018 9  3 - 14 mmol/L Final     Glucose 12/03/2018 106* 70 - 99 mg/dL Final    Fasting specimen     Urea Nitrogen 12/03/2018 17  7 - 30 mg/dL Final     Creatinine 12/03/2018 1.31* 0.66 - 1.25 mg/dL Final     GFR Estimate 12/03/2018 55* >60 mL/min/1.7m2 Final    Non  GFR Calc     GFR Estimate If Black 12/03/2018 66  >60 mL/min/1.7m2 Final    African American GFR Calc     Calcium 12/03/2018 9.3  8.5 - 10.1 mg/dL Final     Bilirubin Total 12/03/2018 0.5  0.2 - 1.3 mg/dL Final     Albumin 12/03/2018 4.2  3.4 - 5.0 g/dL Final     Protein Total 12/03/2018 8.5  6.8 - 8.8 g/dL Final     Alkaline Phosphatase 12/03/2018 72  40 - 150 U/L Final     ALT 12/03/2018 31  0 - 70 U/L Final     AST 12/03/2018 20  0 - 45 U/L Final     Hemoglobin 12/03/2018 16.7  13.3 - 17.7 g/dL Final     Parathyroid Hormone  "Intact 12/03/2018 24  18 - 80 pg/mL Final         ASSESSMENT and PLAN:    Type II Diabetes, .    DIABETES Type II:                       Lab Results   Component Value Date    A1C 5.9 12/03/2018       Control   unable to assess  Lab Results   Component Value Date    A1C 5.9 12/03/2018     Lab Results   Component Value Date    MICROL <5 12/03/2018     No results found for: MICROALBUMIN Control      The pt will make a future lab appoinment for all bloodwork as they are not fasting today, Check a HGBA1C , Recommended to take ASA  mg daily for appropriate patient, Compliance with the recommended diabetic diet was stressed, Regular aerobic exercise was encouraged, Home glucose monitoring encouraged, Annual eye exam recommended, Hepatitis B #2 and a tetanus vaccine recommended, Self foot exam demonstrated and recommended to be done nightly, Apply moisturizer to feet with in 3 minutes of showering or bathing, Follow up in clinic in 3 months for a diabetes check and Future labs ordered and the patient was instructed to make a lab appt 1 week prior to next diabetes visit      BP/HTN:   BP Readings from Last 3 Encounters:   04/08/19 127/78   12/03/18 115/72     Control   good    - Medication:continue the current doses of medication.  (make sure to order \"Ace not prescribed intentionally if not on an ACE/ARB)  The patient was advised to do the following therapuetic life style changes  - Dietary sodium restriction and increase potassium and Calcium intake  - Regular aerobic exercise  - Weight loss  - Discontinue smoking if applicable  - Avoid regular NSAID use if applicable  - Avoid regular decongestant use if applicable  - Follow up in clinic in 3 months for a recheck  - Check a basic metabolic panel today if needed    Patient Education: Reviewed risks of hypertension and principles of   Treatment.    HYPERCHOLESTEROLEMIA:     The 10-year ASCVD risk score (Aileen DC Jr., et al., 2013) is: 34.2%    Values used to calculate " the score:      Age: 68 years      Sex: Male      Is Non- : No      Diabetic: Yes      Tobacco smoker: No      Systolic Blood Pressure: 127 mmHg      Is BP treated: Yes      HDL Cholesterol: 42 mg/dL      Total Cholesterol: 222 mg/dL    Lab Results   Component Value Date     12/03/2018      Control   unable to assess  Cholesterol   Date Value Ref Range Status   12/03/2018 222 (H) <200 mg/dL Final     Comment:     Desirable:       <200 mg/dl     HDL Cholesterol   Date Value Ref Range Status   12/03/2018 42 >39 mg/dL Final     LDL Cholesterol Calculated   Date Value Ref Range Status   12/03/2018 138 (H) <100 mg/dL Final     Comment:     Above desirable:  100-129 mg/dl  Borderline High:  130-159 mg/dL  High:             160-189 mg/dL  Very high:       >189 mg/dl       Triglycerides   Date Value Ref Range Status   12/03/2018 210 (H) <150 mg/dL Final     Comment:     Borderline high:  150-199 mg/dl  High:             200-499 mg/dl  Very high:       >499 mg/dl  Fasting specimen       No results found for: CHOLHDLRATIO      The patient is on a low intensity statin and this is the appropriate treatment based on the ASCVD risk but we just raised it at the last visit.    (If LDL>69 on maximum dose statin and patient has CAD/ASCVD add additional LDL lowering therapy)    The patient's LDL is less than 70 so no statin is indicated at this time.    The patient's HDL is normal  The patient's triglycerides are abnormal.    We have discussed the results and we will continue the current management.     Screening Questionnaire for Adult Immunization    Are you sick today?   No   Do you have allergies to medications, food, a vaccine component or latex?   No   Have you ever had a serious reaction after receiving a vaccination?   No   Do you have a long-term health problem with heart disease, lung disease, asthma, kidney disease, metabolic disease (e.g. diabetes), anemia, or other blood disorder?   No   Do  you have cancer, leukemia, HIV/AIDS, or any other immune system problem?   Yes   In the past 3 months, have you taken medications that affect  your immune system, such as prednisone, other steroids, or anticancer drugs; drugs for the treatment of rheumatoid arthritis, Crohn s disease, or psoriasis; or have you had radiation treatments?   No   Have you had a seizure, or a brain or other nervous system problem?   No   During the past year, have you received a transfusion of blood or blood     products, or been given immune (gamma) globulin or antiviral drug?   No   For women: Are you pregnant or is there a chance you could become        pregnant during the next month?   No   Have you received any vaccinations in the past 4 weeks?   No     Immunization questionnaire was positive for at least one answer.  Notified Linda.        Per orders of Dr. Mckeon, injection of TD Hepb given by Carmela Thacker. Patient instructed to remain in clinic for 15 minutes afterwards, and to report any adverse reaction to me immediately.       Screening performed by Carmela Thacker on 4/8/2019 at 11:34 AM.

## 2019-04-12 DIAGNOSIS — E78.00 ELEVATED LDL CHOLESTEROL LEVEL: ICD-10-CM

## 2019-04-12 DIAGNOSIS — E11.9 TYPE 2 DIABETES MELLITUS WITHOUT COMPLICATION, WITHOUT LONG-TERM CURRENT USE OF INSULIN (H): ICD-10-CM

## 2019-04-12 LAB
CHOLEST SERPL-MCNC: 195 MG/DL
HBA1C MFR BLD: 6.2 % (ref 0–5.6)
HDLC SERPL-MCNC: 42 MG/DL
LDLC SERPL CALC-MCNC: 120 MG/DL
NONHDLC SERPL-MCNC: 153 MG/DL
TRIGL SERPL-MCNC: 165 MG/DL

## 2019-04-12 PROCEDURE — 80061 LIPID PANEL: CPT | Performed by: FAMILY MEDICINE

## 2019-04-12 PROCEDURE — 36415 COLL VENOUS BLD VENIPUNCTURE: CPT | Performed by: FAMILY MEDICINE

## 2019-04-12 PROCEDURE — 83036 HEMOGLOBIN GLYCOSYLATED A1C: CPT | Performed by: FAMILY MEDICINE

## 2019-04-12 NOTE — RESULT ENCOUNTER NOTE
César,  I have reviewed the results of the laboratory tests that we recently ordered. All of the lab work performed was normal or considered normal for you except your LDL or bad cholesterol which is elevated and puts you at higher risk for a stroke or heart attack. I would recommend that we increase the dose of the pravastatin if you are tolerating it well. Please let me know what you think about that and what pharmacy to call in your prescription(s).   Sincerely,   Ronnie Mckeon

## 2019-06-14 DIAGNOSIS — N18.30 CKD (CHRONIC KIDNEY DISEASE) STAGE 3, GFR 30-59 ML/MIN (H): ICD-10-CM

## 2019-06-14 DIAGNOSIS — I10 HYPERTENSION GOAL BP (BLOOD PRESSURE) < 140/90: ICD-10-CM

## 2019-06-14 RX ORDER — LISINOPRIL 10 MG/1
TABLET ORAL
Qty: 90 TABLET | Refills: 1 | Status: SHIPPED | OUTPATIENT
Start: 2019-06-14 | End: 2019-12-16

## 2019-07-25 DIAGNOSIS — E11.9 TYPE 2 DIABETES MELLITUS WITHOUT COMPLICATION, WITHOUT LONG-TERM CURRENT USE OF INSULIN (H): ICD-10-CM

## 2019-07-25 RX ORDER — METFORMIN HCL 500 MG
TABLET, EXTENDED RELEASE 24 HR ORAL
Qty: 30 TABLET | Refills: 0 | Status: SHIPPED | OUTPATIENT
Start: 2019-07-25 | End: 2019-08-26

## 2019-07-25 NOTE — TELEPHONE ENCOUNTER
A 30-day supply only is refilled as patient is overdue for appointment    TC, patient due for:  Diabetes OV with PCP     Katerin MARCUSN, RN

## 2019-07-31 ENCOUNTER — TELEPHONE (OUTPATIENT)
Dept: FAMILY MEDICINE | Facility: CLINIC | Age: 69
End: 2019-07-31

## 2019-08-26 DIAGNOSIS — E11.9 TYPE 2 DIABETES MELLITUS WITHOUT COMPLICATION, WITHOUT LONG-TERM CURRENT USE OF INSULIN (H): ICD-10-CM

## 2019-08-28 RX ORDER — METFORMIN HCL 500 MG
TABLET, EXTENDED RELEASE 24 HR ORAL
Qty: 90 TABLET | Refills: 1 | Status: SHIPPED | OUTPATIENT
Start: 2019-08-28 | End: 2020-02-25

## 2019-08-28 NOTE — TELEPHONE ENCOUNTER
Routing refill request to provider for review/approval because:  Nicol given x1 and patient did not follow up, please advise.Patient due for diabetes visit.  Elizabeth Rizvi RN

## 2019-10-14 ENCOUNTER — OFFICE VISIT (OUTPATIENT)
Dept: OPTOMETRY | Facility: CLINIC | Age: 69
End: 2019-10-14
Payer: COMMERCIAL

## 2019-10-14 DIAGNOSIS — H52.223 REGULAR ASTIGMATISM OF BOTH EYES: ICD-10-CM

## 2019-10-14 DIAGNOSIS — E11.9 TYPE 2 DIABETES MELLITUS WITHOUT COMPLICATION, WITHOUT LONG-TERM CURRENT USE OF INSULIN (H): Primary | ICD-10-CM

## 2019-10-14 DIAGNOSIS — H52.4 PRESBYOPIA: ICD-10-CM

## 2019-10-14 PROCEDURE — 92015 DETERMINE REFRACTIVE STATE: CPT | Performed by: OPTOMETRIST

## 2019-10-14 PROCEDURE — 92004 COMPRE OPH EXAM NEW PT 1/>: CPT | Performed by: OPTOMETRIST

## 2019-10-14 ASSESSMENT — REFRACTION_MANIFEST
OS_CYLINDER: +1.00
METHOD_AUTOREFRACTION: 1
OS_SPHERE: -1.00
OD_AXIS: 175
OS_ADD: +2.25
OS_SPHERE: -1.25
OD_CYLINDER: +0.75
OD_ADD: +2.25
OD_SPHERE: -0.75
OD_AXIS: 176
OS_AXIS: 174
OD_SPHERE: -0.75
OS_AXIS: 170
OS_CYLINDER: +0.75
OD_CYLINDER: +0.75

## 2019-10-14 ASSESSMENT — CONF VISUAL FIELD
OS_NORMAL: 1
OD_NORMAL: 1
METHOD: COUNTING FINGERS

## 2019-10-14 ASSESSMENT — VISUAL ACUITY
OS_PH_SC: 20/25
OD_SC: 20/30
OD_SC+: -2
OS_PH_SC+: -1
OS_SC+: -2
OD_SC: 20/70-1
OS_SC: 20/40
METHOD: SNELLEN - LINEAR
OS_SC: 20/30-1

## 2019-10-14 ASSESSMENT — EXTERNAL EXAM - RIGHT EYE: OD_EXAM: NORMAL

## 2019-10-14 ASSESSMENT — TONOMETRY
OS_IOP_MMHG: 18
IOP_METHOD: APPLANATION
OD_IOP_MMHG: 18

## 2019-10-14 ASSESSMENT — CUP TO DISC RATIO
OS_RATIO: 0.2
OD_RATIO: 0.2

## 2019-10-14 ASSESSMENT — SLIT LAMP EXAM - LIDS
COMMENTS: NORMAL
COMMENTS: NORMAL

## 2019-10-14 ASSESSMENT — KERATOMETRY
OS_AXISANGLE2_DEGREES: 154
OD_K1POWER_DIOPTERS: 44.00
OD_K2POWER_DIOPTERS: 45.00
OS_K1POWER_DIOPTERS: 44.50
OD_AXISANGLE2_DEGREES: 151
OS_K2POWER_DIOPTERS: 44.00

## 2019-10-14 ASSESSMENT — EXTERNAL EXAM - LEFT EYE: OS_EXAM: NORMAL

## 2019-10-14 NOTE — PROGRESS NOTES
"Chief Complaint   Patient presents with     Diabetic Eye Exam       No use of insulin  Hemoglobin A1C   Date Value Ref Range Status   04/12/2019 6.2 (H) 0 - 5.6 % Final     Comment:     Normal <5.7% Prediabetes 5.7-6.4%  Diabetes 6.5% or higher - adopted from ADA   consensus guidelines.     12/03/2018 5.9 (H) 0 - 5.6 % Final     Comment:     Normal <5.7% Prediabetes 5.7-6.4%  Diabetes 6.5% or higher - adopted from ADA   consensus guidelines.           Last Eye Exam: 3-4 years ago  Dilated Previously: Yes    What are you currently using to see?  Patient has glasses that he \"pretty much never wears\". Does wear non prescription sunglasses religiously     Distance Vision Acuity: Satisfied with vision, no changes, able to do everything without the glasses which is why he doesn't wear them. He \"may\" use the glasses to drive a long distance late or after dark    Near Vision Acuity: Satisfied with vision while reading and using computer unaided.     Eye Comfort: good usually, does have spring allergies, mild allergies past 2 days- took antihistamine   Do you use eye drops? : Yes: uses drops for allergies in the spring   Occupation or Hobbies: Financial/ Housing counselor     Rani Hilliard Optometric Assistant      Medical, surgical and family histories reviewed and updated 10/14/2019.       OBJECTIVE: See Ophthalmology exam    ASSESSMENT:    ICD-10-CM    1. Type 2 diabetes mellitus without complication, without long-term current use of insulin (H) E11.9 EYE EXAM (SIMPLE-NONBILLABLE)     REFRACTION   2. Regular astigmatism of both eyes H52.223 EYE EXAM (SIMPLE-NONBILLABLE)     REFRACTION   3. Presbyopia H52.4 EYE EXAM (SIMPLE-NONBILLABLE)     REFRACTION      PLAN:    César Denis aware  eye exam results will be sent to Clinic, [facility].  Patient Instructions   Patient was advised of today's exam findings.  Fill glasses prescription  Keep blood sugar under good control  Return in 1 year for diabetic eye " exam      Diabetes weakens the blood vessels all over the body, including the eyes. Damage to the blood vessels in the eyes can cause swelling or bleeding into part of the eye (called the retina). This is called diabetic retinopathy (MARISOL-tin-AH-puh-thee). If not treated, this disease can cause vision loss or blindness.   Symptoms may include blurred or distorted vision, but many people have no symptoms. It's important to see your eye doctor regularly to check for problems.   Early treatment and good control can help protect your vision. Here are the things you can do to help prevent vision loss:      1. Keep your blood sugar levels under tight control.      2. Bring high blood pressure under control.      3. No smoking.      4. Have yearly dilated eye exams.      Janet Jung O.D.  [facility]   [address] [place], [place] [id number] [phone]

## 2019-10-14 NOTE — LETTER
10/14/2019         RE: César Denis  [address]  [place] [address]        Dear Colleague,    Thank you for referring your patient, César Denis, to the [facility]. No diabetic retinopathy was found at eye exam. Please see a copy of my visit note below.        Again, thank you for allowing me to participate in the care of your patient.        Sincerely,        Janet Jung, OD

## 2019-10-14 NOTE — PATIENT INSTRUCTIONS
Patient was advised of today's exam findings.  Fill glasses prescription  Keep blood sugar under good control  Return in 1 year for diabetic eye exam      Diabetes weakens the blood vessels all over the body, including the eyes. Damage to the blood vessels in the eyes can cause swelling or bleeding into part of the eye (called the retina). This is called diabetic retinopathy (MARISOL-tin-AH-puh-thee). If not treated, this disease can cause vision loss or blindness.   Symptoms may include blurred or distorted vision, but many people have no symptoms. It's important to see your eye doctor regularly to check for problems.   Early treatment and good control can help protect your vision. Here are the things you can do to help prevent vision loss:      1. Keep your blood sugar levels under tight control.      2. Bring high blood pressure under control.      3. No smoking.      4. Have yearly dilated eye exams.      Janet Jung O.D.  [facility]   [address] [place], [place] [id number] [phone]

## 2019-10-22 ASSESSMENT — ENCOUNTER SYMPTOMS: COUGH: 1

## 2019-10-22 ASSESSMENT — ACTIVITIES OF DAILY LIVING (ADL): CURRENT_FUNCTION: NO ASSISTANCE NEEDED

## 2019-10-25 ENCOUNTER — OFFICE VISIT (OUTPATIENT)
Dept: FAMILY MEDICINE | Facility: CLINIC | Age: 69
End: 2019-10-25
Payer: COMMERCIAL

## 2019-10-25 VITALS
TEMPERATURE: 98 F | BODY MASS INDEX: 28.82 KG/M2 | RESPIRATION RATE: 22 BRPM | DIASTOLIC BLOOD PRESSURE: 78 MMHG | SYSTOLIC BLOOD PRESSURE: 112 MMHG | HEART RATE: 101 BPM | WEIGHT: 184 LBS | OXYGEN SATURATION: 97 %

## 2019-10-25 DIAGNOSIS — Z23 NEED FOR VACCINATION: ICD-10-CM

## 2019-10-25 DIAGNOSIS — Z23 NEED FOR PROPHYLACTIC VACCINATION AND INOCULATION AGAINST INFLUENZA: ICD-10-CM

## 2019-10-25 DIAGNOSIS — Z00.00 ENCOUNTER FOR MEDICARE ANNUAL WELLNESS EXAM: Primary | ICD-10-CM

## 2019-10-25 DIAGNOSIS — N18.30 CKD (CHRONIC KIDNEY DISEASE) STAGE 3, GFR 30-59 ML/MIN (H): ICD-10-CM

## 2019-10-25 DIAGNOSIS — E11.9 TYPE 2 DIABETES MELLITUS WITHOUT COMPLICATION, WITHOUT LONG-TERM CURRENT USE OF INSULIN (H): ICD-10-CM

## 2019-10-25 LAB
ALBUMIN SERPL-MCNC: 3.9 G/DL (ref 3.4–5)
ALP SERPL-CCNC: 83 U/L (ref 40–150)
ALT SERPL W P-5'-P-CCNC: 24 U/L (ref 0–70)
ANION GAP SERPL CALCULATED.3IONS-SCNC: 4 MMOL/L (ref 3–14)
AST SERPL W P-5'-P-CCNC: 15 U/L (ref 0–45)
BILIRUB SERPL-MCNC: 0.7 MG/DL (ref 0.2–1.3)
BUN SERPL-MCNC: 18 MG/DL (ref 7–30)
CALCIUM SERPL-MCNC: 9.6 MG/DL (ref 8.5–10.1)
CHLORIDE SERPL-SCNC: 104 MMOL/L (ref 94–109)
CHOLEST SERPL-MCNC: 191 MG/DL
CO2 SERPL-SCNC: 28 MMOL/L (ref 20–32)
CREAT SERPL-MCNC: 1.3 MG/DL (ref 0.66–1.25)
CREAT UR-MCNC: 156 MG/DL
GFR SERPL CREATININE-BSD FRML MDRD: 56 ML/MIN/{1.73_M2}
GLUCOSE SERPL-MCNC: 116 MG/DL (ref 70–99)
HBA1C MFR BLD: 6.1 % (ref 0–5.6)
HDLC SERPL-MCNC: 44 MG/DL
HGB BLD-MCNC: 16.7 G/DL (ref 13.3–17.7)
LDLC SERPL CALC-MCNC: 116 MG/DL
MICROALBUMIN UR-MCNC: 6 MG/L
MICROALBUMIN/CREAT UR: 4.06 MG/G CR (ref 0–17)
NONHDLC SERPL-MCNC: 147 MG/DL
POTASSIUM SERPL-SCNC: 4.3 MMOL/L (ref 3.4–5.3)
PROT SERPL-MCNC: 8 G/DL (ref 6.8–8.8)
PTH-INTACT SERPL-MCNC: 22 PG/ML (ref 18–80)
SODIUM SERPL-SCNC: 136 MMOL/L (ref 133–144)
TRIGL SERPL-MCNC: 155 MG/DL

## 2019-10-25 PROCEDURE — 99214 OFFICE O/P EST MOD 30 MIN: CPT | Mod: 25 | Performed by: FAMILY MEDICINE

## 2019-10-25 PROCEDURE — 36415 COLL VENOUS BLD VENIPUNCTURE: CPT | Performed by: FAMILY MEDICINE

## 2019-10-25 PROCEDURE — 90746 HEPB VACCINE 3 DOSE ADULT IM: CPT | Performed by: FAMILY MEDICINE

## 2019-10-25 PROCEDURE — 83036 HEMOGLOBIN GLYCOSYLATED A1C: CPT | Performed by: FAMILY MEDICINE

## 2019-10-25 PROCEDURE — 85018 HEMOGLOBIN: CPT | Performed by: FAMILY MEDICINE

## 2019-10-25 PROCEDURE — 90662 IIV NO PRSV INCREASED AG IM: CPT | Performed by: FAMILY MEDICINE

## 2019-10-25 PROCEDURE — 80053 COMPREHEN METABOLIC PANEL: CPT | Performed by: FAMILY MEDICINE

## 2019-10-25 PROCEDURE — G0010 ADMIN HEPATITIS B VACCINE: HCPCS | Performed by: FAMILY MEDICINE

## 2019-10-25 PROCEDURE — G0008 ADMIN INFLUENZA VIRUS VAC: HCPCS | Performed by: FAMILY MEDICINE

## 2019-10-25 PROCEDURE — 80061 LIPID PANEL: CPT | Performed by: FAMILY MEDICINE

## 2019-10-25 PROCEDURE — 83970 ASSAY OF PARATHORMONE: CPT | Performed by: FAMILY MEDICINE

## 2019-10-25 PROCEDURE — 82043 UR ALBUMIN QUANTITATIVE: CPT | Performed by: FAMILY MEDICINE

## 2019-10-25 ASSESSMENT — PAIN SCALES - GENERAL: PAINLEVEL: NO PAIN (0)

## 2019-10-25 NOTE — PROGRESS NOTES
SUBJECTIVE:   César Denis is a 68 year old male who presents for Preventive Visit.        --------------------------------------------------------------------------------------------------------------------------------------    His last physical was 12-3-18 so we did a diabetes recheck appointment(s) as he was due for that.    [place] diabetes resourses:  [email or web address]        To access diabetes educational materials with in EPIC use <dot>[email or web address]      Put this in AFTER VISIT SUMMARY if needed for the patient to access information online [email or web address]      SUBJECTIVE:  César Denis is a 68 year old male who presents today for a follow up appointment for management of DIABETES MELLITUS.    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      The patient reports that he IS taking the medication as prescribed.     ----------------------------------------------------------------------------------------------------------------------------------------    BP Readings from Last 3 Encounters:   10/25/19 112/78   04/08/19 127/78   12/03/18 115/72     The patient reports that he IS NOT currently smoking.  History   Smoking Status     Never Smoker   Smokeless Tobacco     Never  Used         The 10-year ASCVD risk score (Aileen GREENE Jr., et al., 2013) is: 26.5%    Values used to calculate the score:      Age: 68 years      Sex: Male      Is Non- : No      Diabetic: Yes      Tobacco smoker: No      Systolic Blood Pressure: 112 mmHg      Is BP treated: Yes      HDL Cholesterol: 42 mg/dL      Total Cholesterol: 195 mg/dL        Current Outpatient Medications   Medication Sig Dispense Refill     albuterol (PROAIR HFA/PROVENTIL HFA/VENTOLIN HFA) 108 (90 Base) MCG/ACT inhaler Inhale 2 puffs into the lungs 4 times daily 3 Inhaler 3     cetirizine (ZYRTEC) 10 MG CHEW Take 10 mg by mouth daily       EPINEPHrine, Anaphylaxis, (ADRENALIN) 1 MG/ML injection Inject Subcutaneous once       fluticasone (FLONASE) 50 MCG/ACT nasal spray Spray 1 spray into both nostrils daily       lisinopril (PRINIVIL/ZESTRIL) 10 MG tablet TAKE 1 TABLET BY MOUTH ONCE DAILY 90 tablet 1     metFORMIN (GLUCOPHAGE-XR) 500 MG 24 hr tablet TAKE 1 TABLET BY MOUTH ONCE DAILY WITH DINNER**NEEDS APPOINTMENT** 90 tablet 1     pravastatin (PRAVACHOL) 20 MG tablet Take 1 tablet (20 mg) by mouth At Bedtime 90 tablet 3     sildenafil (REVATIO) 20 MG tablet Take 20 mg by mouth 3-5 tablets once daily as needed 30-60 mins before needed for ED         The patient reports that he IS taking a statin.   (Reminder all diabetics with a ASCVD risk greater or equal to 7.5% should be on a high intensity statin, otherwise on a moderate intensity statin)      The patient reports that he IS NOT taking  aspirin daily.  (Reminder all diabetic patients with a cardiovascular risk factor and > 50 should take a daily aspirin)   he is not taking aspirin because he has an intolerance of it..  (Reminder to intentionally not prescribe aspirin in )    The patient reports that he IS doing a self foot exam daily.  The patient reports that he does exercise in the form of walking his dog  2-3 times a day  for about 45 minutes .  The  patient reports that he IS following the recommended diabetic diet. He  would give himself a C+ grade on his diet.  The patient reports that his last eye exam was 2 weeks ago.       Immunization History   Administered Date(s) Administered     HepB-Adult 11/05/2012, 04/08/2019     Influenza (H1N1) 12/16/2009     Influenza (High Dose) 3 valent vaccine 10/24/2018     Influenza (IIV3) PF 09/01/2009, 10/03/2011, 11/05/2012     Influenza Vaccine IM > 6 months Valent IIV4 10/27/2015     Influenza Vaccine, 3 YRS +, IM (QUADRIVALENT W/PRESERVATIVES) 10/13/2017     Pneumo Conj 13-V (2010&after) 10/25/2017     Pneumococcal 23 valent 09/01/2009, 12/03/2018     TD (ADULT, 7+) 04/08/2019     TDAP Vaccine (Adacel) 07/18/2008     Td (Adult), Adsorbed 06/18/2004     Zoster vaccine, live 09/10/2012     The patient reports that he has started the hepatitis B vaccine series in the past. (recommended for age 19-59 and can be given to age 60 or older)  The patient reports that he has had a pnuemovax in the past.  The patient reports that he has not had a flu shot for the current influenza season.  The patient would like to have a Hepatitis B, Influenza and Shingrix    Patient concerns: has no specific complaints and has been feeling well.        EXAM:  /78   Pulse 101   Temp 98  F (36.7  C) (Oral)   Resp 22   Wt 83.5 kg (184 lb)   SpO2 97%   BMI 28.82 kg/m    Wt Readings from Last 4 Encounters:   10/25/19 83.5 kg (184 lb)   04/08/19 85.7 kg (189 lb)   12/03/18 83.5 kg (184 lb)     Body mass index is 28.82 kg/m .    General:  César is awake, alert, and cooperative.  NAD.      Diabetic Foot Screen:  Any complaints of increased pain or numbness ? No  Is there a foot ulcer now or a history of foot ulcer? No  Does the foot have an abnormal shape? No  Are the nails thick, too long or ingrown? No  Are there any redness or open areas? No         Sensation Testing done at all points on the diagram with monofilament     Right Foot:  Sensation Normal at all points  Left Foot: Sensation Normal at all points     Risk Category: 0- No loss of protective sensation  Performed by Ronnie Mckeon MD                  Previsit labs drawn include:  Orders Only on 04/12/2019   Component Date Value Ref Range Status     Cholesterol 04/12/2019 195  <200 mg/dL Final     Triglycerides 04/12/2019 165* <150 mg/dL Final    Comment: Borderline high:  150-199 mg/dl  High:             200-499 mg/dl  Very high:       >499 mg/dl  Fasting specimen       HDL Cholesterol 04/12/2019 42  >39 mg/dL Final     LDL Cholesterol Calculated 04/12/2019 120* <100 mg/dL Final    Comment: Above desirable:  100-129 mg/dl  Borderline High:  130-159 mg/dL  High:             160-189 mg/dL  Very high:       >189 mg/dl       Non HDL Cholesterol 04/12/2019 153* <130 mg/dL Final    Comment: Above Desirable:  130-159 mg/dl  Borderline high:  160-189 mg/dl  High:             190-219 mg/dl  Very high:       >219 mg/dl       Hemoglobin A1C 04/12/2019 6.2* 0 - 5.6 % Final    Comment: Normal <5.7% Prediabetes 5.7-6.4%  Diabetes 6.5% or higher - adopted from ADA   consensus guidelines.           ASSESSMENT and PLAN:    Type II Diabetes, .    DIABETES Type II:                       Lab Results   Component Value Date    A1C 6.2 04/12/2019       Control   unable to assess  Lab Results   Component Value Date    A1C 6.2 04/12/2019    A1C 5.9 12/03/2018     Lab Results   Component Value Date    MICROL <5 12/03/2018     No results found for: MICROALBUMIN Control   unable to assess    Check a HGBA1C , Check a microalbumin, Check a Creatinine/GFR, Compliance with the recommended diabetic diet was stressed, Regular aerobic exercise was encouraged, Home glucose monitoring encouraged, Annual eye exam recommended, Self foot exam demonstrated and recommended to be done nightly, Apply moisturizer to feet with in 3 minutes of showering or bathing, Follow up in clinic in 3 months for a diabetes check and Future  "labs ordered and the patient was instructed to make a lab appt 1 week prior to next diabetes visit      BP/HTN:   BP Readings from Last 3 Encounters:   10/25/19 112/78   04/08/19 127/78   12/03/18 115/72     Control   good    - Medication:continue the current doses of medication.  (make sure to order \"Ace not prescribed intentionally if not on an ACE/ARB)  The patient was advised to do the following therapuetic life style changes  - Dietary sodium restriction and increase potassium and Calcium intake  - Regular aerobic exercise  - Weight loss  - Discontinue smoking if applicable  - Avoid regular NSAID use if applicable  - Avoid regular decongestant use if applicable  - Follow up in clinic in 6 months for a recheck  - Check a basic metabolic panel today if needed    Patient Education: Reviewed risks of hypertension and principles of   Treatment.    HYPERCHOLESTEROLEMIA:     The 10-year ASCVD risk score (Aileen GREENE Jr., et al., 2013) is: 26.5%    Values used to calculate the score:      Age: 68 years      Sex: Male      Is Non- : No      Diabetic: Yes      Tobacco smoker: No      Systolic Blood Pressure: 112 mmHg      Is BP treated: Yes      HDL Cholesterol: 42 mg/dL      Total Cholesterol: 195 mg/dL    Lab Results   Component Value Date     04/12/2019      Control   fair  Cholesterol   Date Value Ref Range Status   04/12/2019 195 <200 mg/dL Final   12/03/2018 222 (H) <200 mg/dL Final     Comment:     Desirable:       <200 mg/dl     HDL Cholesterol   Date Value Ref Range Status   04/12/2019 42 >39 mg/dL Final   12/03/2018 42 >39 mg/dL Final     LDL Cholesterol Calculated   Date Value Ref Range Status   04/12/2019 120 (H) <100 mg/dL Final     Comment:     Above desirable:  100-129 mg/dl  Borderline High:  130-159 mg/dL  High:             160-189 mg/dL  Very high:       >189 mg/dl     12/03/2018 138 (H) <100 mg/dL Final     Comment:     Above desirable:  100-129 mg/dl  Borderline High:  " 130-159 mg/dL  High:             160-189 mg/dL  Very high:       >189 mg/dl       Triglycerides   Date Value Ref Range Status   04/12/2019 165 (H) <150 mg/dL Final     Comment:     Borderline high:  150-199 mg/dl  High:             200-499 mg/dl  Very high:       >499 mg/dl  Fasting specimen     12/03/2018 210 (H) <150 mg/dL Final     Comment:     Borderline high:  150-199 mg/dl  High:             200-499 mg/dl  Very high:       >499 mg/dl  Fasting specimen       No results found for: CHOLHDLRATIO      The patient is on a low intensity statin and this is the appropriate treatment based on the ASCVD risk.    (If LDL>69 on maximum dose statin and patient has CAD/ASCVD add additional LDL lowering therapy)    The patient's LDL is less than 70 so no statin is indicated at this time.    The patient's HDL is normal  The patient's triglycerides are abnormal.    The patient is fasting and we will recheck the fasting lipid panel .

## 2019-10-25 NOTE — PATIENT INSTRUCTIONS
Patient Education   Personalized Prevention Plan  You are due for the preventive services outlined below.  Your care team is available to assist you in scheduling these services.  If you have already completed any of these items, please share that information with your care team to update in your medical record.  Health Maintenance Due   Topic Date Due     Zoster (Shingles) Vaccine (2 of 3) 11/05/2012     AORTIC ANEURYSM SCREENING (SYSTEM ASSIGNED)  12/22/2016     Flu Vaccine (1) 09/01/2019     A1C Lab  10/12/2019

## 2019-10-28 NOTE — RESULT ENCOUNTER NOTE
"César,  I have reviewed the results of the laboratory tests that we recently ordered. All of the lab work performed was normal or considered normal for you except your cholesterol. I would like to see the LDL \"bad\" cholesterol lower. If you are tolerating the pravastatin we should increase that dose from 20 to 40. Let me know if that is okay with you.     Sincerely,   Ronnie Mckeon MD      "

## 2019-10-29 ENCOUNTER — MYC MEDICAL ADVICE (OUTPATIENT)
Dept: FAMILY MEDICINE | Facility: CLINIC | Age: 69
End: 2019-10-29

## 2019-10-29 NOTE — TELEPHONE ENCOUNTER
"Patient responding to Result Note sent to him by PCP:      César,   I have reviewed the results of the laboratory tests that we recently ordered. All of the lab work performed was normal or considered normal for you except your cholesterol. I would like to see the LDL \"bad\" cholesterol lower. If you are tolerating the pravastatin we should increase that dose from 20 to 40. Let me know if that is okay with you.     Sincerely,   Ronnie Mckeon MD         Routing to provider to advise.  Katerin BECKER, RN        "

## 2019-12-16 DIAGNOSIS — E78.00 ELEVATED LDL CHOLESTEROL LEVEL: ICD-10-CM

## 2019-12-16 DIAGNOSIS — N18.30 CKD (CHRONIC KIDNEY DISEASE) STAGE 3, GFR 30-59 ML/MIN (H): ICD-10-CM

## 2019-12-16 DIAGNOSIS — I10 HYPERTENSION GOAL BP (BLOOD PRESSURE) < 140/90: ICD-10-CM

## 2019-12-17 RX ORDER — PRAVASTATIN SODIUM 20 MG
TABLET ORAL
Qty: 90 TABLET | Refills: 3 | Status: SHIPPED | OUTPATIENT
Start: 2019-12-17 | End: 2020-12-10

## 2019-12-17 RX ORDER — LISINOPRIL 10 MG/1
TABLET ORAL
Qty: 90 TABLET | Refills: 3 | Status: SHIPPED | OUTPATIENT
Start: 2019-12-17 | End: 2020-12-10

## 2019-12-17 NOTE — TELEPHONE ENCOUNTER
"No appointment pending at this time.  Routing to provider to advise.    Katerin BECKER, RN      Requested Prescriptions   Pending Prescriptions Disp Refills     lisinopril (PRINIVIL/ZESTRIL) 10 MG tablet [Pharmacy Med Name: LISINOPRIL 10MG  TAB]  0     Sig: TAKE 1 TABLET BY MOUTH ONCE DAILY       ACE Inhibitors (Including Combos) Protocol Failed - 12/16/2019  5:30 AM        Failed - Normal serum creatinine on file in past 12 months     Recent Labs   Lab Test 10/25/19  1155   CR 1.30*             Passed - Blood pressure under 140/90 in past 12 months     BP Readings from Last 3 Encounters:   10/25/19 112/78   04/08/19 127/78   12/03/18 115/72                 Passed - Recent (12 mo) or future (30 days) visit within the authorizing provider's specialty     Patient has had an office visit with the authorizing provider or a provider within the authorizing providers department within the previous 12 mos or has a future within next 30 days. See \"Patient Info\" tab in inbasket, or \"Choose Columns\" in Meds & Orders section of the refill encounter.              Passed - Medication is active on med list        Passed - Patient is age 18 or older        Passed - Normal serum potassium on file in past 12 months     Recent Labs   Lab Test 10/25/19  1155   POTASSIUM 4.3             pravastatin (PRAVACHOL) 20 MG tablet [Pharmacy Med Name: PRAVASTATIN 20MG    TAB]  0     Sig: TAKE 1 TABLET BY MOUTH ONCE DAILY AT BEDTIME       Statins Protocol Passed - 12/16/2019  5:30 AM        Passed - LDL on file in past 12 months     Recent Labs   Lab Test 10/25/19  1155   *             Passed - No abnormal creatine kinase in past 12 months     No lab results found.             Passed - Recent (12 mo) or future (30 days) visit within the authorizing provider's specialty     Patient has had an office visit with the authorizing provider or a provider within the authorizing providers department within the previous 12 mos or has a future " "within next 30 days. See \"Patient Info\" tab in inbasket, or \"Choose Columns\" in Meds & Orders section of the refill encounter.              Passed - Medication is active on med list        Passed - Patient is age 18 or older          "

## 2020-02-25 DIAGNOSIS — E11.9 TYPE 2 DIABETES MELLITUS WITHOUT COMPLICATION, WITHOUT LONG-TERM CURRENT USE OF INSULIN (H): ICD-10-CM

## 2020-02-25 RX ORDER — METFORMIN HCL 500 MG
TABLET, EXTENDED RELEASE 24 HR ORAL
Qty: 180 TABLET | Refills: 0 | Status: SHIPPED | OUTPATIENT
Start: 2020-02-25 | End: 2020-07-27

## 2020-02-25 NOTE — LETTER
February 25, 2020    César Denis  [address] [address]        Dear César,       We recently received a refill request for metFORMIN (GLUCOPHAGE-XR) 500 MG 24 hr tablet.  We have refilled this for a one time supply only because you are due for a:    Physical office visit      Please call at your earliest convenience so that there will not be a delay with your future refills.          Thank you,   Your [facility] Team/marilyn  [phone]

## 2020-02-25 NOTE — TELEPHONE ENCOUNTER
"TC, patient due for:  Physical with PCP     Rx refilled per [facility] refill protocol.    Katerin MARCUSN, RN    Requested Prescriptions   Signed Prescriptions Disp Refills    metFORMIN (GLUCOPHAGE-XR) 500 MG 24 hr tablet 180 tablet 0     Sig: TAKE 1 TABLET BY MOUTH ONCE DAILY WITH DINNER**NEEDS APPOINTMENT**       Biguanide Agents Passed - 2/25/2020  5:30 AM        Passed - Blood pressure less than 140/90 in past 6 months     BP Readings from Last 3 Encounters:   10/25/19 112/78   04/08/19 127/78   12/03/18 115/72                 Passed - Patient has documented LDL within the past 12 mos.     Recent Labs   Lab Test 10/25/19  1155   *             Passed - Patient has had a Microalbumin in the past 15 mos.     Recent Labs   Lab Test 10/25/19  1200   MICROL 6   UMALCR 4.06             Passed - Patient is age 10 or older        Passed - Patient has documented A1c within the specified period of time.     If HgbA1C is 8 or greater, it needs to be on file within the past 3 months.  If less than 8, must be on file within the past 6 months.     Recent Labs   Lab Test 10/25/19  1155   A1C 6.1*             Passed - Patient's CR is NOT>1.4 OR Patient's EGFR is NOT<45 within past 12 mos.     Recent Labs   Lab Test 10/25/19  1155   GFRESTIMATED 56*   GFRESTBLACK 65       Recent Labs   Lab Test 10/25/19  1155   CR 1.30*             Passed - Patient does NOT have a diagnosis of CHF.        Passed - Medication is active on med list        Passed - Recent (6 mo) or future (30 days) visit within the authorizing provider's specialty     Patient had office visit in the last 6 months or has a visit in the next 30 days with authorizing provider or within the authorizing provider's specialty.  See \"Patient Info\" tab in inbasket, or \"Choose Columns\" in Meds & Orders section of the refill encounter.                    "

## 2020-02-27 DIAGNOSIS — R06.2 WHEEZING: ICD-10-CM

## 2020-02-27 RX ORDER — ALBUTEROL SULFATE 90 UG/1
AEROSOL, METERED RESPIRATORY (INHALATION)
Qty: 18 G | Refills: 2 | Status: SHIPPED | OUTPATIENT
Start: 2020-02-27 | End: 2020-07-27

## 2020-03-11 ENCOUNTER — HEALTH MAINTENANCE LETTER (OUTPATIENT)
Age: 70
End: 2020-03-11

## 2020-03-25 ENCOUNTER — MYC MEDICAL ADVICE (OUTPATIENT)
Dept: FAMILY MEDICINE | Facility: CLINIC | Age: 70
End: 2020-03-25

## 2020-04-08 ENCOUNTER — DOCUMENTATION ONLY (OUTPATIENT)
Dept: FAMILY MEDICINE | Facility: CLINIC | Age: 70
End: 2020-04-08

## 2020-04-08 NOTE — PROGRESS NOTES
Chart reviewed by Ambulatory Quality and Data team    Please abstract the following data from this visit with this patient into the appropriate field in Epic:    Tests that can be patient reported without a hard copy:    Colonoscopy done on this date: 10/10/2010 (approximately), by this group: Unknown, results were abnormal.

## 2020-07-27 ENCOUNTER — OFFICE VISIT (OUTPATIENT)
Dept: FAMILY MEDICINE | Facility: CLINIC | Age: 70
End: 2020-07-27
Payer: COMMERCIAL

## 2020-07-27 VITALS
HEART RATE: 77 BPM | SYSTOLIC BLOOD PRESSURE: 118 MMHG | DIASTOLIC BLOOD PRESSURE: 78 MMHG | HEIGHT: 67 IN | OXYGEN SATURATION: 98 % | TEMPERATURE: 98.2 F | BODY MASS INDEX: 28.41 KG/M2 | WEIGHT: 181 LBS

## 2020-07-27 DIAGNOSIS — N18.30 CKD (CHRONIC KIDNEY DISEASE) STAGE 3, GFR 30-59 ML/MIN (H): ICD-10-CM

## 2020-07-27 DIAGNOSIS — E11.9 TYPE 2 DIABETES MELLITUS WITHOUT COMPLICATION, WITHOUT LONG-TERM CURRENT USE OF INSULIN (H): ICD-10-CM

## 2020-07-27 DIAGNOSIS — Z12.11 COLON CANCER SCREENING: ICD-10-CM

## 2020-07-27 DIAGNOSIS — R06.2 WHEEZING: ICD-10-CM

## 2020-07-27 DIAGNOSIS — R35.1 BENIGN PROSTATIC HYPERPLASIA WITH NOCTURIA: ICD-10-CM

## 2020-07-27 DIAGNOSIS — Z71.1 CONCERN ABOUT INFECTIOUS DISEASE WITHOUT DIAGNOSIS: ICD-10-CM

## 2020-07-27 DIAGNOSIS — I10 HYPERTENSION GOAL BP (BLOOD PRESSURE) < 140/90: ICD-10-CM

## 2020-07-27 DIAGNOSIS — Z00.00 ENCOUNTER FOR MEDICARE ANNUAL WELLNESS EXAM: Primary | ICD-10-CM

## 2020-07-27 DIAGNOSIS — N50.89 SCROTAL MASS: ICD-10-CM

## 2020-07-27 DIAGNOSIS — N40.1 BENIGN PROSTATIC HYPERPLASIA WITH NOCTURIA: ICD-10-CM

## 2020-07-27 DIAGNOSIS — E55.9 VITAMIN D DEFICIENCY: ICD-10-CM

## 2020-07-27 DIAGNOSIS — Z12.5 SCREENING FOR PROSTATE CANCER: ICD-10-CM

## 2020-07-27 PROCEDURE — 99397 PER PM REEVAL EST PAT 65+ YR: CPT | Performed by: FAMILY MEDICINE

## 2020-07-27 PROCEDURE — 99207 C FOOT EXAM  NO CHARGE: CPT | Mod: 25 | Performed by: FAMILY MEDICINE

## 2020-07-27 RX ORDER — ALBUTEROL SULFATE 90 UG/1
AEROSOL, METERED RESPIRATORY (INHALATION)
Qty: 18 G | Refills: 2 | Status: SHIPPED | OUTPATIENT
Start: 2020-07-27 | End: 2021-01-27

## 2020-07-27 RX ORDER — DUTASTERIDE 0.5 MG/1
0.5 CAPSULE, LIQUID FILLED ORAL DAILY
Qty: 90 CAPSULE | Refills: 3 | Status: SHIPPED | OUTPATIENT
Start: 2020-07-27 | End: 2021-01-27

## 2020-07-27 RX ORDER — METFORMIN HCL 500 MG
TABLET, EXTENDED RELEASE 24 HR ORAL
Qty: 180 TABLET | Refills: 0 | Status: SHIPPED | OUTPATIENT
Start: 2020-07-27 | End: 2021-01-27

## 2020-07-27 ASSESSMENT — PAIN SCALES - GENERAL: PAINLEVEL: NO PAIN (0)

## 2020-07-27 ASSESSMENT — MIFFLIN-ST. JEOR: SCORE: 1549.64

## 2020-07-27 NOTE — PROGRESS NOTES
"  SUBJECTIVE:   César Denis is a 69 year old male who presents for Preventive Visit.      Are you in the first 12 months of your Medicare Part B coverage?  No    Physical Health:    In general, how would you rate your overall physical health? fair    Outside of work, how many days during the week do you exercise? 6-7 days/week    Outside of work, approximately how many minutes a day do you exercise?greater than 60 minutes    If you drink alcohol do you typically have >3 drinks per day or >7 drinks per week? No    Do you usually eat at least 4 servings of fruit and vegetables a day, include whole grains & fiber and avoid regularly eating high fat or \"junk\" foods? NO, maybe 2    Do you have any problems taking medications regularly?  No    Do you have any side effects from medications? none    Needs assistance for the following daily activities: no assistance needed    Which of the following safety concerns are present in your home?  none identified     Hearing impairment: No    In the past 6 months, have you been bothered by leaking of urine? no    Mental Health:    In general, how would you rate your overall mental or emotional health? good  PHQ-2 Score:      Do you feel safe in your environment? Yes    Have you ever done Advance Care Planning? (For example, a Health Directive, POLST, or a discussion with a medical provider or your loved ones about your wishes): Yes, patient states has an Advance Care Planning document and will bring a copy to the clinic.    Additional concerns to address?      Fall risk:  Fallen 2 or more times in the past year?: No  Any fall with injury in the past year?: No    Cognitive Screenin) Repeat 3 items (Leader, Season, Table)    2) Clock draw: NORMAL  3) 3 item recall: Recalls 3 objects  Results: 3 items recalled: COGNITIVE IMPAIRMENT LESS LIKELY    Mini-CogTM Copyright S Eusebio. Licensed by the author for use in [facility]; reprinted with permission (jesus@.[facility]). " "All rights reserved.      Do you have sleep apnea, excessive snoring or daytime drowsiness?: no            Reviewed and updated as needed this visit by clinical staff         Reviewed and updated as needed this visit by Provider        Social History     Tobacco Use     Smoking status: Never Smoker     Smokeless tobacco: Never Used   Substance Use Topics     Alcohol use: Yes                           Current providers sharing in care for this patient include:   Patient Care Team:  Babatunde Rougemont Paula as PCP - General (Clinic)  Ronnie Mckeon MD as Assigned PCP    The following health maintenance items are reviewed in Epic and correct as of today:  Health Maintenance   Topic Date Due     HEPATITIS B IMMUNIZATION (1 of 3 - Risk 3-dose series) 12/22/1970     ZOSTER IMMUNIZATION (2 of 3) 11/05/2012     AORTIC ANEURYSM SCREENING (SYSTEM ASSIGNED)  12/22/2016     FALL RISK ASSESSMENT  12/03/2019     PHQ-2  01/01/2020     DIABETIC FOOT EXAM  04/08/2020     A1C  04/25/2020     INFLUENZA VACCINE (1) 09/01/2020     EYE EXAM  10/14/2020     MEDICARE ANNUAL WELLNESS VISIT  10/25/2020     BMP  10/25/2020     LIPID  10/25/2020     MICROALBUMIN  10/25/2020     ADVANCE CARE PLANNING  04/08/2024     COLORECTAL CANCER SCREENING  10/10/2028     DTAP/TDAP/TD IMMUNIZATION (3 - Td) 04/08/2029     HEPATITIS C SCREENING  Completed     PNEUMOCOCCAL IMMUNIZATION 65+ HIGH/HIGHEST RISK  Completed     IPV IMMUNIZATION  Aged Out     MENINGITIS IMMUNIZATION  Aged Out           ROS:      OBJECTIVE:   There were no vitals taken for this visit. Estimated body mass index is 28.82 kg/m  as calculated from the following:    Height as of 4/8/19: 1.702 m (5' 7\").    Weight as of 10/25/19: 83.5 kg (184 lb).  EXAM:       Diagnostic Test Results:  Labs reviewed in Epic    ASSESSMENT / PLAN:       ICD-10-CM    1. Encounter for Medicare annual wellness exam  Z00.00    2. Type 2 diabetes mellitus without complication, without long-term current use of " "insulin (H)  E11.9 C FOOT EXAM  NO CHARGE     Comprehensive metabolic panel     JUST IN CASE     Hemoglobin A1c     Lipid panel reflex to direct LDL Fasting     TSH with free T4 reflex     metFORMIN (GLUCOPHAGE-XR) 500 MG 24 hr tablet   3. CKD (chronic kidney disease) stage 3, GFR 30-59 ml/min (H)  N18.3 Hemoglobin     Albumin Random Urine Quantitative with Creat Ratio     Parathyroid Hormone Intact   4. Wheezing  R06.2 VENTOLIN  (90 Base) MCG/ACT inhaler   5. Hypertension goal BP (blood pressure) < 140/90  I10    6. Vitamin D deficiency  E55.9 25 Hydroxyvitamin D2 and D3   7. Screening for prostate cancer  Z12.5 PSA, screen   8. Colon cancer screening  Z12.11 GASTROENTEROLOGY ADULT REF PROCEDURE ONLY   9. Benign prostatic hyperplasia with nocturia  N40.1 dutasteride (AVODART) 0.5 MG capsule    R35.1    10. Concern about infectious disease without diagnosis  Z71.1 COVID-19 Virus (Coronavirus) Antibody & Titer Reflex   11. Scrotal mass  N50.89 US Abdomen or Pelvis Doppler Limited       COUNSELING:  Reviewed preventive health counseling, as reflected in patient instructions       Regular exercise       Healthy diet/nutrition       Vision screening       Dental care       Immunizations    Vaccinated for: Zoster  At the pharmacy            Aspirin Prophylaxsis       Colon cancer screening       Osteoporosis Prevention/Bone Health    Estimated body mass index is 28.82 kg/m  as calculated from the following:    Height as of 4/8/19: 1.702 m (5' 7\").    Weight as of 10/25/19: 83.5 kg (184 lb).         reports that he has never smoked. He has never used smokeless tobacco.      Appropriate preventive services were discussed with this patient, including applicable screening as appropriate for cardiovascular disease, diabetes, osteopenia/osteoporosis, and glaucoma.  As appropriate for age/gender, discussed screening for colorectal cancer, prostate cancer, breast cancer, and cervical cancer. Checklist reviewing preventive " services available has been given to the patient.    Reviewed patients plan of care and provided an AVS. The Basic Care Plan (routine screening as documented in Health Maintenance) for César meets the Care Plan requirement. This Care Plan has been established and reviewed with the Patient.    Counseling Resources:  ATP IV Guidelines  Pooled Cohorts Equation Calculator  Breast Cancer Risk Calculator  FRAX Risk Assessment  ICSI Preventive Guidelines  Dietary Guidelines for Americans, 2010  USDA's MyPlate  ASA Prophylaxis  Lung CA Screening    Ronnie Mckeon MD  [facility]  --------------------------------------------------------------------------------------------------------------------------------------  SUBJECTIVE:  César Denis is a 69 year old male who presents to the clinic today for a routine physical exam.    The patient's last physical was 12-3-18.    Cholesterol   Date Value Ref Range Status   10/25/2019 191 <200 mg/dL Final   04/12/2019 195 <200 mg/dL Final     HDL Cholesterol   Date Value Ref Range Status   10/25/2019 44 >39 mg/dL Final   04/12/2019 42 >39 mg/dL Final     LDL Cholesterol Calculated   Date Value Ref Range Status   10/25/2019 116 (H) <100 mg/dL Final     Comment:     Above desirable:  100-129 mg/dl  Borderline High:  130-159 mg/dL  High:             160-189 mg/dL  Very high:       >189 mg/dl     04/12/2019 120 (H) <100 mg/dL Final     Comment:     Above desirable:  100-129 mg/dl  Borderline High:  130-159 mg/dL  High:             160-189 mg/dL  Very high:       >189 mg/dl       Triglycerides   Date Value Ref Range Status   10/25/2019 155 (H) <150 mg/dL Final     Comment:     Borderline high:  150-199 mg/dl  High:             200-499 mg/dl  Very high:       >499 mg/dl  Fasting specimen     04/12/2019 165 (H) <150 mg/dL Final     Comment:     Borderline high:  150-199 mg/dl  High:             200-499 mg/dl  Very high:       >499 mg/dl  Fasting specimen       No results found  for: CHOLHDLRATIO  The patient's last fasting lipid panel was done 9 months ago and the results are listed above.  He is tolerating the pravastatin much better than the statins that he previously tried.   He does not want to try a higher dose.       The 10-year ASCVD risk score (Aileen GREENE Jr., et al., 2013) is: 29.5%    Values used to calculate the score:      Age: 69 years      Sex: Male      Is Non- : No      Diabetic: Yes      Tobacco smoker: No      Systolic Blood Pressure: 118 mmHg      Is BP treated: Yes      HDL Cholesterol: 44 mg/dL      Total Cholesterol: 191 mg/dL      Risk Enhancers: Diabetes    The patient reports that he has been treated for high blood pressure.    The patient reports that he does not take a daily aspirin as he has side effects from it.     Lab Results   Component Value Date    HCVAB Nonreactive 12/03/2018     The patient reports that he has been screened for Hepatitis C    (Screen all baby boomers once per CDC-- the generation born from 1945 through 1965 and per USPTF screen age 19 to 79 especially younger people who have used IV drugs)  He would not like to have an Hepatitis C test today    No results found for: HIAGAB  The patient reports that he has not been screened for HIV   (Screen all 15 to 64 years old)  He would not like to have an HIV test today      Immunization History   Administered Date(s) Administered     HepB-Adult 11/05/2012, 04/08/2019, 10/25/2019     Influenza (H1N1) 12/16/2009     Influenza (High Dose) 3 valent vaccine 10/24/2018, 10/25/2019     Influenza (IIV3) PF 09/01/2009, 10/03/2011, 11/05/2012     Influenza Vaccine IM > 6 months Valent IIV4 10/27/2015     Influenza Vaccine, 6+MO IM (QUADRIVALENT W/PRESERVATIVES) 10/13/2017     Pneumo Conj 13-V (2010&after) 10/25/2017     Pneumococcal 23 valent 09/01/2009, 12/03/2018     TD (ADULT, 7+) 04/08/2019     TDAP Vaccine (Adacel) 07/18/2008     Td (Adult), Adsorbed 06/18/2004     Zoster vaccine,  live 09/10/2012     The patient's believes that his last tetanus shot was given 1 year(s) ago.   The patient believes that he has not had a Shingrix in the past  The patient believes that he has had a PPSV23 in the past.  The patient believes that he has had a PCV13 in the past.  The patient believes that he has had a seasonal flu vaccination this fall or winter.  The patient would like to have a Shingrix      No results found for this or any previous visit.]   The patient denies a family history of colon cancer.  The patient reports that he has had a colonoscopy. His  last colonoscopy was in 2010. The patient was told to have this repeated in 10 years.    The patient reports that he and his wife or partner are not using contraception as she has gone through menopause.   He is not interested in a vasectomy in the near future.    The patient denies a family history of prostate cancer. After discussing the pros and cons of checking a PSA he  Does want to have this test drawn today.   The patient reports that he eats or drinks 3 servings of dairy products per day. The patient reports that he has dental appointments approximately every 6 months.  The patient reports that he  has an eye examination approximately every 1.0 year(s).    Do you currently smoke? No  How many years have you smoked? 0   How many packs per day did you smoke on average? N/A  (if more than 30 pack year history and the patient is age 55-80 consider ordering an annual low dose radiation lung CT to screen for cancer)  (Do not order if patient has quit more than 15 years ago or has a health condition that limits life expectancy or could not tolerate curative lung surgery)  Are you interested having a lung CT to screen for lung cancer? N/A    If the patient has smoked more that 100 cigarettes, has the patient had an imaging study (US or CT) for an AAA between the ages of 65 and 75? No: .              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 No pertinent surgical history.    Family History   Problem Relation Age of Onset     Cancer Mother      Heart Disease Father      Glaucoma No family hx of      Macular Degeneration No family hx of        Social History     Socioeconomic History     Marital status:      Spouse name: Not on file     Number of children: Not on file     Years of education: Not on file     Highest education level: Not on file   Occupational History     Not on file   Social Needs     Financial resource strain: Not on file     Food insecurity     Worry: Not on file     Inability: Not on file     Transportation needs     Medical: Not on file     Non-medical: Not on file   Tobacco Use     Smoking status: Never Smoker     Smokeless tobacco: Never Used   Substance and Sexual Activity     Alcohol use: Yes     Drug use: No     Sexual activity: Yes   Lifestyle     Physical activity     Days per week: Not on file     Minutes per session: Not on file     Stress: Not on file   Relationships     Social connections     Talks on phone: Not on file     Gets together: Not on file     Attends Druze service: Not on file     Active member of club or organization: Not on file     Attends meetings of clubs or organizations: Not on file     Relationship status: Not on file     Intimate partner violence     Fear of current or ex partner: Not on file     Emotionally abused: Not on file     Physically abused: Not on file     Forced sexual activity: Not on file   Other Topics Concern     Not on file   Social History Narrative     Not on file       Current  "Outpatient Medications   Medication Sig Dispense Refill     cetirizine (ZYRTEC) 10 MG CHEW Take 10 mg by mouth daily       EPINEPHrine, Anaphylaxis, (ADRENALIN) 1 MG/ML injection Inject Subcutaneous once       fluticasone (FLONASE) 50 MCG/ACT nasal spray Spray 1 spray into both nostrils daily       lisinopril (PRINIVIL/ZESTRIL) 10 MG tablet TAKE 1 TABLET BY MOUTH ONCE DAILY 90 tablet 3     metFORMIN (GLUCOPHAGE-XR) 500 MG 24 hr tablet TAKE 1 TABLET BY MOUTH ONCE DAILY WITH DINNER**NEEDS APPOINTMENT** 180 tablet 0     pravastatin (PRAVACHOL) 20 MG tablet TAKE 1 TABLET BY MOUTH ONCE DAILY AT BEDTIME 90 tablet 3     sildenafil (REVATIO) 20 MG tablet Take 20 mg by mouth 3-5 tablets once daily as needed 30-60 mins before needed for ED       VENTOLIN  (90 Base) MCG/ACT inhaler INHALE 2 PUFFS BY MOUTH 4 TIMES DAILY 18 g 2       Review Of Systems    Genitourinary: nocturia x 1    PHYSICAL EXAMINATION:  Blood pressure 118/78, pulse 77, temperature 98.2  F (36.8  C), temperature source Tympanic, height 1.702 m (5' 7\"), weight 82.1 kg (181 lb), SpO2 98 %.  General appearance - healthy, alert and no distress  Skin - Skin color, texture, turgor normal. No rashes or lesions.  Head - Normocephalic. No masses, lesions, tenderness or abnormalities  Eyes - conjunctivae/corneas clear. PERRL, EOM's intact. Fundi benign  Ears - External ears normal. Canals clear. TM's normal.  Nose/Sinuses - Nares normal. Septum midline. Mucosa normal. No drainage or sinus tenderness.  Oropharynx - Lips, mucosa, and tongue normal. Teeth and gums normal.  Neck - Neck supple. No adenopathy. Thyroid symmetric, normal size,  Lungs - Percussion normal. Good diaphragmatic excursion. Lungs clear  Heart - PMI normal. No lifts, heaves, or thrills. RRR. No murmurs, clicks gallops or rub  Abdomen - Abdomen soft, non-tender. BS normal. No masses, organomegaly  Extremities - Extremities normal. No deformities, edema, or skin discoloration.  Musculoskeletal - " Spine ROM normal. Muscular strength intact.  Peripheral pulses - radial=4/4, femoral=4/4, popliteal=4/4, dorsalis pedis=4/4,  Neuro - Gait normal. Reflexes normal and symmetric. Sensation grossly WNL.  Genitalia - Penis normal. No urethral discharge. Scrotum normal to palpation except mass above the left testicle. No hernia.  Rectal - positive findings: prostate 1+      Office Visit on 10/25/2019   Component Date Value Ref Range Status     Hemoglobin A1C 10/25/2019 6.1* 0 - 5.6 % Final    Comment: Normal <5.7% Prediabetes 5.7-6.4%  Diabetes 6.5% or higher - adopted from ADA   consensus guidelines.       Creatinine Urine 10/25/2019 156  mg/dL Final     Albumin Urine mg/L 10/25/2019 6  mg/L Final     Albumin Urine mg/g Cr 10/25/2019 4.06  0 - 17 mg/g Cr Final     Sodium 10/25/2019 136  133 - 144 mmol/L Final     Potassium 10/25/2019 4.3  3.4 - 5.3 mmol/L Final     Chloride 10/25/2019 104  94 - 109 mmol/L Final     Carbon Dioxide 10/25/2019 28  20 - 32 mmol/L Final     Anion Gap 10/25/2019 4  3 - 14 mmol/L Final     Glucose 10/25/2019 116* 70 - 99 mg/dL Final    Fasting specimen     Urea Nitrogen 10/25/2019 18  7 - 30 mg/dL Final     Creatinine 10/25/2019 1.30* 0.66 - 1.25 mg/dL Final     GFR Estimate 10/25/2019 56* >60 mL/min/[1.73_m2] Final    Comment: Non  GFR Calc  Starting 12/18/2018, serum creatinine based estimated GFR (eGFR) will be   calculated using the Chronic Kidney Disease Epidemiology Collaboration   (CKD-EPI) equation.       GFR Estimate If Black 10/25/2019 65  >60 mL/min/[1.73_m2] Final    Comment:  GFR Calc  Starting 12/18/2018, serum creatinine based estimated GFR (eGFR) will be   calculated using the Chronic Kidney Disease Epidemiology Collaboration   (CKD-EPI) equation.       Calcium 10/25/2019 9.6  8.5 - 10.1 mg/dL Final     Bilirubin Total 10/25/2019 0.7  0.2 - 1.3 mg/dL Final     Albumin 10/25/2019 3.9  3.4 - 5.0 g/dL Final     Protein Total 10/25/2019 8.0  6.8 -  8.8 g/dL Final     Alkaline Phosphatase 10/25/2019 83  40 - 150 U/L Final     ALT 10/25/2019 24  0 - 70 U/L Final     AST 10/25/2019 15  0 - 45 U/L Final     Cholesterol 10/25/2019 191  <200 mg/dL Final     Triglycerides 10/25/2019 155* <150 mg/dL Final    Comment: Borderline high:  150-199 mg/dl  High:             200-499 mg/dl  Very high:       >499 mg/dl  Fasting specimen       HDL Cholesterol 10/25/2019 44  >39 mg/dL Final     LDL Cholesterol Calculated 10/25/2019 116* <100 mg/dL Final    Comment: Above desirable:  100-129 mg/dl  Borderline High:  130-159 mg/dL  High:             160-189 mg/dL  Very high:       >189 mg/dl       Non HDL Cholesterol 10/25/2019 147* <130 mg/dL Final    Comment: Above Desirable:  130-159 mg/dl  Borderline high:  160-189 mg/dl  High:             190-219 mg/dl  Very high:       >219 mg/dl       Hemoglobin 10/25/2019 16.7  13.3 - 17.7 g/dL Final     Parathyroid Hormone Intact 10/25/2019 22  18 - 80 pg/mL Final       ASSESSMENT:    ICD-10-CM    1. Encounter for Medicare annual wellness exam  Z00.00        Well-Adult Physical Exam.  Health Maintenance Due   Topic Date Due     HEPATITIS B IMMUNIZATION (1 of 3 - Risk 3-dose series) 12/22/1970     ZOSTER IMMUNIZATION (2 of 3) 11/05/2012     AORTIC ANEURYSM SCREENING (SYSTEM ASSIGNED)  12/22/2016     FALL RISK ASSESSMENT  12/03/2019     PHQ-2  01/01/2020     DIABETIC FOOT EXAM  04/08/2020     A1C  04/25/2020     Health Maintenance   Topic Date Due     HEPATITIS B IMMUNIZATION (1 of 3 - Risk 3-dose series) 12/22/1970     ZOSTER IMMUNIZATION (2 of 3) 11/05/2012     AORTIC ANEURYSM SCREENING (SYSTEM ASSIGNED)  12/22/2016     FALL RISK ASSESSMENT  12/03/2019     PHQ-2  01/01/2020     DIABETIC FOOT EXAM  04/08/2020     A1C  04/25/2020     INFLUENZA VACCINE (1) 09/01/2020     EYE EXAM  10/14/2020     BMP  10/25/2020     LIPID  10/25/2020     MICROALBUMIN  10/25/2020     MEDICARE ANNUAL WELLNESS VISIT  07/27/2021     ADVANCE CARE PLANNING   04/08/2024     COLORECTAL CANCER SCREENING  10/10/2028     DTAP/TDAP/TD IMMUNIZATION (3 - Td) 04/08/2029     HEPATITIS C SCREENING  Completed     PNEUMOCOCCAL IMMUNIZATION 65+ HIGH/HIGHEST RISK  Completed     IPV IMMUNIZATION  Aged Out     MENINGITIS IMMUNIZATION  Aged Out         HEALTH CARE MAINTENENCE: The recommended screening tests and vaccinatons for this patient have been discussed as above.  The appropriate tests and vaccinations  have been ordered or declined by the patient. 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 Recommended 1000 mg of calcium daily  Sunscreen use was recommended especially in the area of tatoos  Refills on chronic medication given  Recommended dental exams every 6 months  Recommended eye exam every 1 years  Follow up in 1 year for the next preventative medical visit    Dutasteride started for BPH    Body mass index is 28.35 kg/m .

## 2020-07-27 NOTE — PATIENT INSTRUCTIONS
Patient Education   Personalized Prevention Plan  You are due for the preventive services outlined below.  Your care team is available to assist you in scheduling these services.  If you have already completed any of these items, please share that information with your care team to update in your medical record.  Health Maintenance Due   Topic Date Due     Hepatitis B Vaccine (1 of 3 - Risk 3-dose series) 12/22/1970     Zoster (Shingles) Vaccine (2 of 3) 11/05/2012     AORTIC ANEURYSM SCREENING (SYSTEM ASSIGNED)  12/22/2016     FALL RISK ASSESSMENT  12/03/2019     PHQ-2  01/01/2020     Diabetic Foot Exam  04/08/2020     A1C Lab  04/25/2020         Please schedule a future laboratory appointment(s) and be sure to have fasted for 10 hours prior to arrival

## 2020-07-27 NOTE — NURSING NOTE
"Chief Complaint   Patient presents with     Wellness Visit     Health Maintenance     order pended, fall risk, PHQ2       Initial /78   Pulse 77   Temp 98.2  F (36.8  C) (Tympanic)   Ht 1.702 m (5' 7\")   Wt 82.1 kg (181 lb)   SpO2 98%   BMI 28.35 kg/m   Estimated body mass index is 28.35 kg/m  as calculated from the following:    Height as of this encounter: 1.702 m (5' 7\").    Weight as of this encounter: 82.1 kg (181 lb).  Medication Reconciliation: complete  Carmela Thacker, [facility]  "

## 2020-08-03 DIAGNOSIS — E11.9 TYPE 2 DIABETES MELLITUS WITHOUT COMPLICATION, WITHOUT LONG-TERM CURRENT USE OF INSULIN (H): ICD-10-CM

## 2020-08-03 DIAGNOSIS — N18.30 CKD (CHRONIC KIDNEY DISEASE) STAGE 3, GFR 30-59 ML/MIN (H): ICD-10-CM

## 2020-08-03 DIAGNOSIS — Z71.1 CONCERN ABOUT INFECTIOUS DISEASE WITHOUT DIAGNOSIS: ICD-10-CM

## 2020-08-03 DIAGNOSIS — Z12.5 SCREENING FOR PROSTATE CANCER: ICD-10-CM

## 2020-08-03 DIAGNOSIS — E55.9 VITAMIN D DEFICIENCY: ICD-10-CM

## 2020-08-03 LAB
HBA1C MFR BLD: 6.3 % (ref 0–5.6)
HGB BLD-MCNC: 15.9 G/DL (ref 13.3–17.7)

## 2020-08-03 PROCEDURE — 36415 COLL VENOUS BLD VENIPUNCTURE: CPT | Performed by: FAMILY MEDICINE

## 2020-08-03 PROCEDURE — 80061 LIPID PANEL: CPT | Performed by: FAMILY MEDICINE

## 2020-08-03 PROCEDURE — 82306 VITAMIN D 25 HYDROXY: CPT | Performed by: FAMILY MEDICINE

## 2020-08-03 PROCEDURE — 99000 SPECIMEN HANDLING OFFICE-LAB: CPT | Performed by: FAMILY MEDICINE

## 2020-08-03 PROCEDURE — 85018 HEMOGLOBIN: CPT | Performed by: FAMILY MEDICINE

## 2020-08-03 PROCEDURE — G0103 PSA SCREENING: HCPCS | Performed by: FAMILY MEDICINE

## 2020-08-03 PROCEDURE — 83970 ASSAY OF PARATHORMONE: CPT | Performed by: FAMILY MEDICINE

## 2020-08-03 PROCEDURE — 86769 SARS-COV-2 COVID-19 ANTIBODY: CPT | Mod: 90 | Performed by: FAMILY MEDICINE

## 2020-08-03 PROCEDURE — 83036 HEMOGLOBIN GLYCOSYLATED A1C: CPT | Performed by: FAMILY MEDICINE

## 2020-08-03 PROCEDURE — 84443 ASSAY THYROID STIM HORMONE: CPT | Performed by: FAMILY MEDICINE

## 2020-08-03 PROCEDURE — 82043 UR ALBUMIN QUANTITATIVE: CPT | Performed by: FAMILY MEDICINE

## 2020-08-04 LAB
CHOLEST SERPL-MCNC: 185 MG/DL
COVID-19 SPIKE RBD ABY TITER: NORMAL
COVID-19 SPIKE RBD ABY: NEGATIVE
CREAT UR-MCNC: 137 MG/DL
DEPRECATED CALCIDIOL+CALCIFEROL SERPL-MC: <71 UG/L (ref 20–75)
HDLC SERPL-MCNC: 40 MG/DL
LDLC SERPL CALC-MCNC: 114 MG/DL
MICROALBUMIN UR-MCNC: <5 MG/L
MICROALBUMIN/CREAT UR: NORMAL MG/G CR (ref 0–17)
NONHDLC SERPL-MCNC: 145 MG/DL
PSA SERPL-ACNC: 2.07 UG/L (ref 0–4)
PTH-INTACT SERPL-MCNC: 22 PG/ML (ref 18–80)
TRIGL SERPL-MCNC: 153 MG/DL
TSH SERPL DL<=0.005 MIU/L-ACNC: 1.25 MU/L (ref 0.4–4)
VITAMIN D2 SERPL-MCNC: <5 UG/L
VITAMIN D3 SERPL-MCNC: 66 UG/L

## 2020-08-04 NOTE — RESULT ENCOUNTER NOTE
César,  I have reviewed the results of the laboratory tests that we recently ordered. All of the lab work performed was normal or considered normal for you.   Sincerely,   Ronnie Mckeon MD

## 2020-08-05 ENCOUNTER — MYC MEDICAL ADVICE (OUTPATIENT)
Dept: FAMILY MEDICINE | Facility: CLINIC | Age: 70
End: 2020-08-05

## 2020-08-18 ENCOUNTER — MYC MEDICAL ADVICE (OUTPATIENT)
Dept: FAMILY MEDICINE | Facility: CLINIC | Age: 70
End: 2020-08-18

## 2020-08-18 ENCOUNTER — HOSPITAL ENCOUNTER (OUTPATIENT)
Facility: CLINIC | Age: 70
End: 2020-08-18
Attending: SURGERY | Admitting: SURGERY
Payer: COMMERCIAL

## 2020-08-18 DIAGNOSIS — Z12.11 COLON CANCER SCREENING: Primary | ICD-10-CM

## 2020-08-19 DIAGNOSIS — Z11.59 ENCOUNTER FOR SCREENING FOR OTHER VIRAL DISEASES: Primary | ICD-10-CM

## 2020-08-20 NOTE — TELEPHONE ENCOUNTER
This RN put in new referral for [facility] per patient preference.     TC, please fax referral to [facility] and let patient know when he can call and schedule.    Katerin Avina BSN, RN

## 2020-08-24 NOTE — TELEPHONE ENCOUNTER
Referral faxed to [facility]. Sherlyn Gordillo TC/Pt Rep

## 2020-08-26 ENCOUNTER — TELEPHONE (OUTPATIENT)
Dept: FAMILY MEDICINE | Facility: CLINIC | Age: 70
End: 2020-08-26

## 2020-08-26 NOTE — TELEPHONE ENCOUNTER
According to our electronic medical record the patient's last colonoscopy was in 2010. If he has had one since then please have those records forwarded to me to review(ed).  Ronnie Mckeon MD

## 2020-08-26 NOTE — TELEPHONE ENCOUNTER
I called [facility] and their records show that the patient had a colonoscopy done on 10/12/18.  They are faxing me the report.  Viridiana Gross,

## 2020-08-26 NOTE — TELEPHONE ENCOUNTER
To provider, [facility] is calling because they received a referral for a colonoscopy.  Their records show that the patient is not due for a colonoscopy until 2023.  Do you want to cancel the referral?  Please advise and TC will call Uny at [facility] [phone].  Viridiana Gross,

## 2020-08-26 NOTE — TELEPHONE ENCOUNTER
I placed the records in your folder review.  The patient had a colonoscopy at [facility] on 10/12/18 and they sent the records to us.  After reviewing the records when you are back tomorrow please advise if you want to cancel the referral.  Route to  so I can notify [facility].  Thank you.  Viridiana Gross,

## 2020-08-28 NOTE — TELEPHONE ENCOUNTER
Please cancel the referral/order for the colonoscopy. The patient is up to date on his screening.  Ronnie Mckeon MD

## 2020-08-31 NOTE — TELEPHONE ENCOUNTER
I spoke to a  at [facility] and asked her to cancel the referral per Dr. Mckeon. She will call the patient and inform him.  Viridiana Gross,

## 2020-10-05 RX ORDER — SODIUM CHLORIDE, SODIUM LACTATE, POTASSIUM CHLORIDE, CALCIUM CHLORIDE 600; 310; 30; 20 MG/100ML; MG/100ML; MG/100ML; MG/100ML
INJECTION, SOLUTION INTRAVENOUS CONTINUOUS
Status: CANCELLED | OUTPATIENT
Start: 2020-10-05

## 2020-10-05 RX ORDER — LIDOCAINE 40 MG/G
CREAM TOPICAL
Status: CANCELLED | OUTPATIENT
Start: 2020-10-05

## 2020-10-05 RX ORDER — ONDANSETRON 2 MG/ML
4 INJECTION INTRAMUSCULAR; INTRAVENOUS
Status: CANCELLED | OUTPATIENT
Start: 2020-10-05

## 2020-12-09 DIAGNOSIS — N18.30 STAGE 3 CHRONIC KIDNEY DISEASE, UNSPECIFIED WHETHER STAGE 3A OR 3B CKD (H): Primary | ICD-10-CM

## 2020-12-09 DIAGNOSIS — E78.00 ELEVATED LDL CHOLESTEROL LEVEL: ICD-10-CM

## 2020-12-09 DIAGNOSIS — I10 HYPERTENSION GOAL BP (BLOOD PRESSURE) < 140/90: ICD-10-CM

## 2020-12-09 DIAGNOSIS — N18.30 CKD (CHRONIC KIDNEY DISEASE) STAGE 3, GFR 30-59 ML/MIN (H): ICD-10-CM

## 2020-12-10 RX ORDER — LISINOPRIL 10 MG/1
TABLET ORAL
Qty: 90 TABLET | Refills: 0 | Status: SHIPPED | OUTPATIENT
Start: 2020-12-10 | End: 2021-01-27

## 2020-12-10 RX ORDER — PRAVASTATIN SODIUM 20 MG
TABLET ORAL
Qty: 90 TABLET | Refills: 0 | Status: SHIPPED | OUTPATIENT
Start: 2020-12-10 | End: 2021-01-27

## 2020-12-10 NOTE — TELEPHONE ENCOUNTER
Routing refill request to provider for review/approval because:  Labs not current:  Creatinine, potassium    Katerin MARCUSN, RN

## 2021-01-27 ENCOUNTER — MYC MEDICAL ADVICE (OUTPATIENT)
Dept: FAMILY MEDICINE | Facility: CLINIC | Age: 71
End: 2021-01-27

## 2021-01-27 DIAGNOSIS — E78.00 ELEVATED LDL CHOLESTEROL LEVEL: ICD-10-CM

## 2021-01-27 DIAGNOSIS — N18.30 STAGE 3 CHRONIC KIDNEY DISEASE, UNSPECIFIED WHETHER STAGE 3A OR 3B CKD (H): ICD-10-CM

## 2021-01-27 DIAGNOSIS — R06.2 WHEEZING: ICD-10-CM

## 2021-01-27 DIAGNOSIS — E11.9 TYPE 2 DIABETES MELLITUS WITHOUT COMPLICATION, WITHOUT LONG-TERM CURRENT USE OF INSULIN (H): ICD-10-CM

## 2021-01-27 DIAGNOSIS — R35.1 BENIGN PROSTATIC HYPERPLASIA WITH NOCTURIA: ICD-10-CM

## 2021-01-27 DIAGNOSIS — I10 HYPERTENSION GOAL BP (BLOOD PRESSURE) < 140/90: ICD-10-CM

## 2021-01-27 DIAGNOSIS — N40.1 BENIGN PROSTATIC HYPERPLASIA WITH NOCTURIA: ICD-10-CM

## 2021-01-27 RX ORDER — PRAVASTATIN SODIUM 20 MG
TABLET ORAL
Qty: 90 TABLET | Refills: 0 | Status: SHIPPED | OUTPATIENT
Start: 2021-01-27 | End: 2021-04-14

## 2021-01-27 RX ORDER — DUTASTERIDE 0.5 MG/1
0.5 CAPSULE, LIQUID FILLED ORAL DAILY
Qty: 90 CAPSULE | Refills: 1 | Status: SHIPPED | OUTPATIENT
Start: 2021-01-27 | End: 2022-06-29

## 2021-01-27 RX ORDER — METFORMIN HCL 500 MG
TABLET, EXTENDED RELEASE 24 HR ORAL
Qty: 180 TABLET | Refills: 0 | Status: SHIPPED | OUTPATIENT
Start: 2021-01-27 | End: 2021-07-28

## 2021-01-27 RX ORDER — LISINOPRIL 10 MG/1
10 TABLET ORAL DAILY
Qty: 90 TABLET | Refills: 0 | Status: SHIPPED | OUTPATIENT
Start: 2021-01-27 | End: 2021-04-14

## 2021-01-27 RX ORDER — ALBUTEROL SULFATE 90 UG/1
AEROSOL, METERED RESPIRATORY (INHALATION)
Qty: 18 G | Refills: 2 | Status: SHIPPED | OUTPATIENT
Start: 2021-01-27 | End: 2021-10-13

## 2021-01-27 NOTE — TELEPHONE ENCOUNTER
Routing refill request to provider for review/approval because:  Labs not current:  A1C, creatinine, potassium    Katerin MARCUSN, RN

## 2021-03-07 ENCOUNTER — HEALTH MAINTENANCE LETTER (OUTPATIENT)
Age: 71
End: 2021-03-07

## 2021-04-14 DIAGNOSIS — N18.30 STAGE 3 CHRONIC KIDNEY DISEASE, UNSPECIFIED WHETHER STAGE 3A OR 3B CKD (H): ICD-10-CM

## 2021-04-14 DIAGNOSIS — I10 HYPERTENSION GOAL BP (BLOOD PRESSURE) < 140/90: ICD-10-CM

## 2021-04-14 DIAGNOSIS — E78.00 ELEVATED LDL CHOLESTEROL LEVEL: ICD-10-CM

## 2021-04-14 RX ORDER — PRAVASTATIN SODIUM 20 MG
TABLET ORAL
Qty: 90 TABLET | Refills: 0 | Status: SHIPPED | OUTPATIENT
Start: 2021-04-14 | End: 2021-09-09

## 2021-04-14 RX ORDER — LISINOPRIL 10 MG/1
TABLET ORAL
Qty: 90 TABLET | Refills: 0 | Status: SHIPPED | OUTPATIENT
Start: 2021-04-14 | End: 2021-09-09

## 2021-04-14 NOTE — TELEPHONE ENCOUNTER
"Routing refill request to provider for review/approval because:  Labs not current:  Cr, K+    Requested Prescriptions   Pending Prescriptions Disp Refills     lisinopril (ZESTRIL) 10 MG tablet [Pharmacy Med Name: LISINOPRIL TABS 10MG] 90 tablet 3     Sig: TAKE 1 TABLET DAILY       ACE Inhibitors (Including Combos) Protocol Failed - 4/14/2021  3:31 PM        Failed - Normal serum creatinine on file in past 12 months     Recent Labs   Lab Test 10/25/19  1155   CR 1.30*       Ok to refill medication if creatinine is low          Failed - Normal serum potassium on file in past 12 months     Recent Labs   Lab Test 10/25/19  1155   POTASSIUM 4.3             Passed - Blood pressure under 140/90 in past 12 months     BP Readings from Last 3 Encounters:   07/27/20 118/78   10/25/19 112/78   04/08/19 127/78                 Passed - Recent (12 mo) or future (30 days) visit within the authorizing provider's specialty     Patient has had an office visit with the authorizing provider or a provider within the authorizing providers department within the previous 12 mos or has a future within next 30 days. See \"Patient Info\" tab in inbasket, or \"Choose Columns\" in Meds & Orders section of the refill encounter.              Passed - Medication is active on med list        Passed - Patient is age 18 or older           pravastatin (PRAVACHOL) 20 MG tablet [Pharmacy Med Name: PRAVASTATIN TABS 20MG] 90 tablet 3     Sig: TAKE 1 TABLET DAILY AT BEDTIME       Statins Protocol Passed - 4/14/2021  3:31 PM        Passed - LDL on file in past 12 months     Recent Labs   Lab Test 08/03/20  0957   *             Passed - No abnormal creatine kinase in past 12 months     No lab results found.             Passed - Recent (12 mo) or future (30 days) visit within the authorizing provider's specialty     Patient has had an office visit with the authorizing provider or a provider within the authorizing providers department within the previous 12 " "mos or has a future within next 30 days. See \"Patient Info\" tab in inbasket, or \"Choose Columns\" in Meds & Orders section of the refill encounter.              Passed - Medication is active on med list        Passed - Patient is age 18 or older             Magda Alcala RN  [facility]    "

## 2021-07-06 ENCOUNTER — TRANSFERRED RECORDS (OUTPATIENT)
Dept: HEALTH INFORMATION MANAGEMENT | Facility: CLINIC | Age: 71
End: 2021-07-06

## 2021-07-27 DIAGNOSIS — E11.9 TYPE 2 DIABETES MELLITUS WITHOUT COMPLICATION, WITHOUT LONG-TERM CURRENT USE OF INSULIN (H): ICD-10-CM

## 2021-07-28 RX ORDER — METFORMIN HCL 500 MG
TABLET, EXTENDED RELEASE 24 HR ORAL
Qty: 180 TABLET | Refills: 1 | Status: SHIPPED | OUTPATIENT
Start: 2021-07-28 | End: 2022-06-24

## 2021-07-28 NOTE — TELEPHONE ENCOUNTER
Routing refill request to provider for review/approval because:  Labs not current:    Lab Results   Component Value Date    A1C 6.3 08/03/2020    A1C 6.1 10/25/2019    A1C 6.2 04/12/2019    A1C 5.9 12/03/2018     Creatinine   Date Value Ref Range Status   10/25/2019 1.30 (H) 0.66 - 1.25 mg/dL Final       Patient needs to be seen because:  Diabetes.   Buffy Hardy RN

## 2021-09-07 DIAGNOSIS — N18.30 STAGE 3 CHRONIC KIDNEY DISEASE, UNSPECIFIED WHETHER STAGE 3A OR 3B CKD (H): ICD-10-CM

## 2021-09-07 DIAGNOSIS — E78.00 ELEVATED LDL CHOLESTEROL LEVEL: ICD-10-CM

## 2021-09-07 DIAGNOSIS — I10 HYPERTENSION GOAL BP (BLOOD PRESSURE) < 140/90: ICD-10-CM

## 2021-09-09 RX ORDER — PRAVASTATIN SODIUM 20 MG
TABLET ORAL
Qty: 90 TABLET | Refills: 0 | Status: SHIPPED | OUTPATIENT
Start: 2021-09-09 | End: 2021-10-26

## 2021-09-09 RX ORDER — LISINOPRIL 10 MG/1
TABLET ORAL
Qty: 90 TABLET | Refills: 0 | Status: SHIPPED | OUTPATIENT
Start: 2021-09-09 | End: 2021-10-25 | Stop reason: SINTOL

## 2021-09-09 NOTE — TELEPHONE ENCOUNTER
Routing refill request to provider for review/approval because:  Labs not current:  last done in 2019  Patient needs to be seen because it has been more than 1 year since last office visit.      Yodit Hoover RN  [facility]

## 2021-09-16 ENCOUNTER — MYC MEDICAL ADVICE (OUTPATIENT)
Dept: FAMILY MEDICINE | Facility: CLINIC | Age: 71
End: 2021-09-16

## 2021-10-10 ENCOUNTER — HEALTH MAINTENANCE LETTER (OUTPATIENT)
Age: 71
End: 2021-10-10

## 2021-10-11 ENCOUNTER — TRANSFERRED RECORDS (OUTPATIENT)
Dept: HEALTH INFORMATION MANAGEMENT | Facility: CLINIC | Age: 71
End: 2021-10-11

## 2021-10-11 LAB — RETINOPATHY: NEGATIVE

## 2021-10-13 ENCOUNTER — MYC MEDICAL ADVICE (OUTPATIENT)
Dept: FAMILY MEDICINE | Facility: CLINIC | Age: 71
End: 2021-10-13
Payer: COMMERCIAL

## 2021-10-13 DIAGNOSIS — R06.2 WHEEZING: ICD-10-CM

## 2021-10-13 RX ORDER — ALBUTEROL SULFATE 90 UG/1
AEROSOL, METERED RESPIRATORY (INHALATION)
Qty: 18 G | Refills: 0 | Status: SHIPPED | OUTPATIENT
Start: 2021-10-13 | End: 2022-08-14

## 2021-10-25 ENCOUNTER — OFFICE VISIT (OUTPATIENT)
Dept: FAMILY MEDICINE | Facility: CLINIC | Age: 71
End: 2021-10-25
Payer: COMMERCIAL

## 2021-10-25 VITALS
HEIGHT: 67 IN | BODY MASS INDEX: 28.56 KG/M2 | WEIGHT: 182 LBS | OXYGEN SATURATION: 99 % | TEMPERATURE: 97.3 F | DIASTOLIC BLOOD PRESSURE: 69 MMHG | HEART RATE: 74 BPM | SYSTOLIC BLOOD PRESSURE: 120 MMHG

## 2021-10-25 DIAGNOSIS — Z00.00 ENCOUNTER FOR MEDICARE ANNUAL WELLNESS EXAM: ICD-10-CM

## 2021-10-25 DIAGNOSIS — Z13.220 SCREENING FOR HYPERLIPIDEMIA: Primary | ICD-10-CM

## 2021-10-25 DIAGNOSIS — C83.98 NON-FOLLICULAR LYMPHOMA OF LYMPH NODES OF MULTIPLE REGIONS, UNSPECIFIED NON-FOLLICULAR LYMPHOMA TYPE (H): ICD-10-CM

## 2021-10-25 DIAGNOSIS — I10 HYPERTENSION GOAL BP (BLOOD PRESSURE) < 140/90: ICD-10-CM

## 2021-10-25 DIAGNOSIS — E11.9 TYPE 2 DIABETES MELLITUS WITHOUT COMPLICATION, WITHOUT LONG-TERM CURRENT USE OF INSULIN (H): ICD-10-CM

## 2021-10-25 DIAGNOSIS — N18.30 STAGE 3 CHRONIC KIDNEY DISEASE, UNSPECIFIED WHETHER STAGE 3A OR 3B CKD (H): ICD-10-CM

## 2021-10-25 LAB
ALBUMIN SERPL-MCNC: 3.6 G/DL (ref 3.4–5)
ALP SERPL-CCNC: 66 U/L (ref 40–150)
ALT SERPL W P-5'-P-CCNC: 22 U/L (ref 0–70)
ANION GAP SERPL CALCULATED.3IONS-SCNC: 7 MMOL/L (ref 3–14)
AST SERPL W P-5'-P-CCNC: 14 U/L (ref 0–45)
BILIRUB SERPL-MCNC: 0.6 MG/DL (ref 0.2–1.3)
BUN SERPL-MCNC: 17 MG/DL (ref 7–30)
CALCIUM SERPL-MCNC: 9.2 MG/DL (ref 8.5–10.1)
CHLORIDE BLD-SCNC: 105 MMOL/L (ref 94–109)
CHOLEST SERPL-MCNC: 169 MG/DL
CO2 SERPL-SCNC: 24 MMOL/L (ref 20–32)
CREAT SERPL-MCNC: 1.45 MG/DL (ref 0.66–1.25)
CREAT UR-MCNC: 151 MG/DL
FASTING STATUS PATIENT QL REPORTED: YES
GFR SERPL CREATININE-BSD FRML MDRD: 49 ML/MIN/1.73M2
GLUCOSE BLD-MCNC: 107 MG/DL (ref 70–99)
HBA1C MFR BLD: 6 % (ref 0–5.6)
HDLC SERPL-MCNC: 41 MG/DL
HGB BLD-MCNC: 15.9 G/DL (ref 13.3–17.7)
LDLC SERPL CALC-MCNC: 101 MG/DL
MICROALBUMIN UR-MCNC: <5 MG/L
MICROALBUMIN/CREAT UR: NORMAL MG/G{CREAT}
NONHDLC SERPL-MCNC: 128 MG/DL
POTASSIUM BLD-SCNC: 4.2 MMOL/L (ref 3.4–5.3)
PROT SERPL-MCNC: 7.1 G/DL (ref 6.8–8.8)
PSA SERPL-MCNC: 3.24 UG/L (ref 0–4)
SODIUM SERPL-SCNC: 136 MMOL/L (ref 133–144)
TRIGL SERPL-MCNC: 136 MG/DL
TSH SERPL DL<=0.005 MIU/L-ACNC: 1.07 MU/L (ref 0.4–4)

## 2021-10-25 PROCEDURE — 83036 HEMOGLOBIN GLYCOSYLATED A1C: CPT | Performed by: FAMILY MEDICINE

## 2021-10-25 PROCEDURE — 83970 ASSAY OF PARATHORMONE: CPT | Performed by: FAMILY MEDICINE

## 2021-10-25 PROCEDURE — 80061 LIPID PANEL: CPT | Performed by: FAMILY MEDICINE

## 2021-10-25 PROCEDURE — 85018 HEMOGLOBIN: CPT | Performed by: FAMILY MEDICINE

## 2021-10-25 PROCEDURE — 84443 ASSAY THYROID STIM HORMONE: CPT | Performed by: FAMILY MEDICINE

## 2021-10-25 PROCEDURE — G0103 PSA SCREENING: HCPCS | Performed by: FAMILY MEDICINE

## 2021-10-25 PROCEDURE — 36415 COLL VENOUS BLD VENIPUNCTURE: CPT | Performed by: FAMILY MEDICINE

## 2021-10-25 PROCEDURE — 80053 COMPREHEN METABOLIC PANEL: CPT | Performed by: FAMILY MEDICINE

## 2021-10-25 PROCEDURE — 99397 PER PM REEVAL EST PAT 65+ YR: CPT | Performed by: FAMILY MEDICINE

## 2021-10-25 PROCEDURE — 99207 PR FOOT EXAM NO CHARGE: CPT | Mod: 25 | Performed by: FAMILY MEDICINE

## 2021-10-25 PROCEDURE — 82043 UR ALBUMIN QUANTITATIVE: CPT | Performed by: FAMILY MEDICINE

## 2021-10-25 RX ORDER — LOSARTAN POTASSIUM 50 MG/1
50 TABLET ORAL DAILY
Qty: 30 TABLET | Refills: 1 | Status: SHIPPED | OUTPATIENT
Start: 2021-10-25 | End: 2021-11-18

## 2021-10-25 ASSESSMENT — ENCOUNTER SYMPTOMS
HEADACHES: 0
MYALGIAS: 0
HEMATOCHEZIA: 0
SHORTNESS OF BREATH: 0
SORE THROAT: 0
PALPITATIONS: 0
CONSTIPATION: 0
HEMATURIA: 0
ARTHRALGIAS: 1
NAUSEA: 0
DYSURIA: 0
ABDOMINAL PAIN: 0
CHILLS: 0
EYE PAIN: 0
FREQUENCY: 0
PARESTHESIAS: 0
NERVOUS/ANXIOUS: 0
DIARRHEA: 0
DIZZINESS: 0
COUGH: 1
HEARTBURN: 1
JOINT SWELLING: 0
FEVER: 0
WEAKNESS: 0

## 2021-10-25 ASSESSMENT — PAIN SCALES - GENERAL: PAINLEVEL: NO PAIN (0)

## 2021-10-25 ASSESSMENT — ACTIVITIES OF DAILY LIVING (ADL): CURRENT_FUNCTION: NO ASSISTANCE NEEDED

## 2021-10-25 ASSESSMENT — MIFFLIN-ST. JEOR: SCORE: 1549.18

## 2021-10-25 NOTE — NURSING NOTE
"Chief Complaint   Patient presents with     Physical       Initial /86   Pulse 74   Temp 97.3  F (36.3  C) (Tympanic)   Ht 5' 7\" (1.702 m)   Wt 182 lb (82.6 kg)   SpO2 99%   BMI 28.51 kg/m   Estimated body mass index is 28.51 kg/m  as calculated from the following:    Height as of this encounter: 5' 7\" (1.702 m).    Weight as of this encounter: 182 lb (82.6 kg).  Medication Reconciliation: complete  Carmela Thacker CMA  "

## 2021-10-25 NOTE — RESULT ENCOUNTER NOTE
César,  I have reviewed the results of the laboratory tests that we recently ordered. All of the laboratory that are back so far are normal or considered normal for you. There are still some labs pending and I will let you know those results when they   are available.  Sincerely,   Ronnie Mckeon MD

## 2021-10-25 NOTE — PROGRESS NOTES
"SUBJECTIVE:   César Denis is a 70 year old male who presents for Preventive Visit.      Patient has been advised of split billing requirements and indicates understanding: Yes   Are you in the first 12 months of your Medicare coverage?  No    Healthy Habits:     In general, how would you rate your overall health?  Fair    Frequency of exercise:  6-7 days/week    Duration of exercise:  30-45 minutes    Do you usually eat at least 4 servings of fruit and vegetables a day, include whole grains    & fiber and avoid regularly eating high fat or \"junk\" foods?  No    Taking medications regularly:  Yes    Medication side effects:  Muscle aches    Ability to successfully perform activities of daily living:  No assistance needed    Home Safety:  No safety concerns identified    Hearing Impairment:  No hearing concerns    In the past 6 months, have you been bothered by leaking of urine?  No    In general, how would you rate your overall mental or emotional health?  Excellent      PHQ-2 Total Score: 0    Additional concerns today:  No    Do you feel safe in your environment? Yes    Have you ever done Advance Care Planning? (For example, a Health Directive, POLST, or a discussion with a medical provider or your loved ones about your wishes): Yes, patient states has an Advance Care Planning document and will bring a copy to the clinic.       Fall risk  Fallen 2 or more times in the past year?: No  Any fall with injury in the past year?: No    Cognitive Screening   1) Repeat 3 items (Leader, Season, Table)    2) Clock draw: NORMAL  3) 3 item recall: Recalls 3 objects  Results: 3 items recalled: COGNITIVE IMPAIRMENT LESS LIKELY    Mini-CogTM Copyright HERNAN Kaplan. Licensed by the author for use in [facility]; reprinted with permission (jesus@.[facility]). All rights reserved.      Do you have sleep apnea, excessive snoring or daytime drowsiness?: no    Reviewed and updated as needed this visit by clinical staff  Tobacco  " Allergies  Meds   Med Hx  Surg Hx  Fam Hx  Soc Hx        Reviewed and updated as needed this visit by Provider  Tobacco               Social History     Tobacco Use     Smoking status: Never Smoker     Smokeless tobacco: Never Used   Substance Use Topics     Alcohol use: Yes     If you drink alcohol do you typically have >3 drinks per day or >7 drinks per week? No    Alcohol Use 10/25/2021   Prescreen: >3 drinks/day or >7 drinks/week? No   Prescreen: >3 drinks/day or >7 drinks/week? -               Current providers sharing in care for this patient include:   Patient Care Team:  Babatunde Bristol Hazen as PCP - General (Clinic)  Ronnie Mckeon MD as Assigned PCP    The following health maintenance items are reviewed in Epic and correct as of today:  Health Maintenance Due   Topic Date Due     ANNUAL REVIEW OF HM ORDERS  Never done     AORTIC ANEURYSM SCREENING (SYSTEM ASSIGNED)  Never done     EYE EXAM  10/14/2020     BMP  10/25/2020     A1C  02/03/2021     DIABETIC FOOT EXAM  07/27/2021     FALL RISK ASSESSMENT  07/27/2021     LIPID  08/03/2021     MICROALBUMIN  08/03/2021               Review of Systems   Constitutional: Negative for chills and fever.   HENT: Positive for congestion. Negative for ear pain, hearing loss and sore throat.    Eyes: Negative for pain and visual disturbance.   Respiratory: Positive for cough. Negative for shortness of breath.    Cardiovascular: Negative for chest pain, palpitations and peripheral edema.   Gastrointestinal: Positive for heartburn. Negative for abdominal pain, constipation, diarrhea, hematochezia and nausea.   Genitourinary: Negative for discharge, dysuria, frequency, genital sores, hematuria, impotence and urgency.   Musculoskeletal: Positive for arthralgias. Negative for joint swelling and myalgias.   Skin: Negative for rash.   Neurological: Negative for dizziness, weakness, headaches and paresthesias.   Psychiatric/Behavioral: Negative for mood changes. The  "patient is not nervous/anxious.          OBJECTIVE:   /69   Pulse 74   Temp 97.3  F (36.3  C) (Tympanic)   Ht 5' 7\" (1.702 m)   Wt 182 lb (82.6 kg)   SpO2 99%   BMI 28.51 kg/m   Estimated body mass index is 28.51 kg/m  as calculated from the following:    Height as of this encounter: 5' 7\" (1.702 m).    Weight as of this encounter: 182 lb (82.6 kg).  Physical Exam          ASSESSMENT / PLAN:       ICD-10-CM    1. Screening for hyperlipidemia  Z13.220 Lipid panel reflex to direct LDL Fasting     Lipid panel reflex to direct LDL Fasting   2. Encounter for Medicare annual wellness exam  Z00.00    3. Type 2 diabetes mellitus without complication, without long-term current use of insulin (H)  E11.9 [place] FOOT EXAM NO CHARGE     Albumin Random Urine Quantitative with Creat Ratio     Comprehensive metabolic panel     Hemoglobin A1c     Lipid panel reflex to direct LDL Fasting     TSH with free T4 reflex     Albumin Random Urine Quantitative with Creat Ratio     Comprehensive metabolic panel     Hemoglobin A1c     Lipid panel reflex to direct LDL Fasting     TSH with free T4 reflex   4. Stage 3 chronic kidney disease, unspecified whether stage 3a or 3b CKD (H)  N18.30 Albumin Random Urine Quantitative with Creat Ratio     Comprehensive metabolic panel     PSA, screen     Hemoglobin     Parathyroid Hormone Intact     Albumin Random Urine Quantitative with Creat Ratio     Parathyroid Hormone Intact     Comprehensive metabolic panel     PSA, screen     Hemoglobin   5. Hypertension goal BP (blood pressure) < 140/90  I10 Comprehensive metabolic panel     losartan (COZAAR) 50 MG tablet     Comprehensive metabolic panel   6. 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    Weight as of this encounter: 182 lb (82.6 kg).        He reports that he has never smoked. He has never used smokeless tobacco.      Appropriate preventive services were discussed with this patient, including applicable screening as appropriate for cardiovascular disease, diabetes, osteopenia/osteoporosis, and glaucoma.  As appropriate for age/gender, discussed screening for colorectal cancer, prostate cancer, breast cancer, and cervical cancer. Checklist reviewing preventive services available has been given to the patient.    Reviewed patients plan of care and provided an AVS. The Basic Care Plan (routine screening as documented in Health Maintenance) for César meets the Care Plan requirement. This Care Plan has been established and reviewed with the Patient.    Counseling Resources:  ATP IV Guidelines  Pooled Cohorts Equation Calculator  Breast Cancer Risk Calculator  Breast Cancer: Medication to Reduce Risk  FRAX Risk Assessment  ICSI Preventive Guidelines  Dietary Guidelines for Americans, 2010  [employer]'s MyPlate  ASA Prophylaxis  Lung CA Screening    Ronnie Mckeon MD  [facility]    Identified Health Risks:  --------------------------------------------------------------------------------------------------------------------------------------  SUBJECTIVE:  César Denis is a 70 year old male who presents to the clinic today for a routine physical exam.    The patient's last physical was  7-27-20    Cholesterol   Date Value Ref Range Status   08/03/2020 185 <200 mg/dL Final   10/25/2019 191 <200 mg/dL Final     HDL Cholesterol   Date Value Ref Range Status "   08/03/2020 40 >39 mg/dL Final   10/25/2019 44 >39 mg/dL Final     LDL Cholesterol Calculated   Date Value Ref Range Status   08/03/2020 114 (H) <100 mg/dL Final     Comment:     Above desirable:  100-129 mg/dl  Borderline High:  130-159 mg/dL  High:             160-189 mg/dL  Very high:       >189 mg/dl     10/25/2019 116 (H) <100 mg/dL Final     Comment:     Above desirable:  100-129 mg/dl  Borderline High:  130-159 mg/dL  High:             160-189 mg/dL  Very high:       >189 mg/dl       Triglycerides   Date Value Ref Range Status   08/03/2020 153 (H) <150 mg/dL Final     Comment:     Borderline high:  150-199 mg/dl  High:             200-499 mg/dl  Very high:       >499 mg/dl     10/25/2019 155 (H) <150 mg/dL Final     Comment:     Borderline high:  150-199 mg/dl  High:             200-499 mg/dl  Very high:       >499 mg/dl  Fasting specimen       No results found for: CHOLHDLRATIO  The patient's last fasting lipid panel was done 1 year ago and the results are listed above.        The 10-year ASCVD risk score (Clintonmartin GREENE Jr., et al., 2013) is: 32.9%    Values used to calculate the score:      Age: 70 years      Sex: Male      Is Non- : No      Diabetic: Yes      Tobacco smoker: No      Systolic Blood Pressure: 120 mmHg      Is BP treated: Yes      HDL Cholesterol: 40 mg/dL      Total Cholesterol: 185 mg/dL      Risk Enhancers:  Diabetes       The patient reports that he has been treated for high blood pressure.  He does complain(s) of a cough that has been an issue for years.  He reports that his primarily when he is laying down.     The patient reports that he does not take a daily aspirin as he has had allergic reactions to it    Lab Results   Component Value Date    HCVAB Nonreactive 12/03/2018     The patient reports that he has been screened for Hepatitis C    (Screen all baby boomers once per CDC-- the generation born from 1945 through 1965 and per USPTF screen age 19 to 79 especially  younger people who have used IV drugs)  He would not like to have an Hepatitis C test today    No results found for: HIAGAB  The patient reports that he has not been screened for HIV   (Screen all 15 to 64 years old)  He would not like to have an HIV test today      Immunization History   Administered Date(s) Administered     COVID-19,PF,Pfizer 03/09/2021, 03/29/2021, 08/14/2021     HepB-Adult 11/05/2012, 04/08/2019, 10/25/2019     Influenza (H1N1) 12/16/2009     Influenza (High Dose) 3 valent vaccine 10/24/2018, 10/25/2019, 09/10/2020, 09/08/2021     Influenza (IIV3) PF 09/01/2009, 10/03/2011, 11/05/2012     Influenza Vaccine IM > 6 months Valent IIV4 (Alfuria,Fluzone) 10/27/2015     Influenza Vaccine, 6+MO IM (QUADRIVALENT W/PRESERVATIVES) 10/13/2017     Pneumo Conj 13-V (2010&after) 10/25/2017     Pneumococcal 23 valent 09/01/2009, 12/03/2018     TD (ADULT, 7+) 04/08/2019     TDAP Vaccine (Adacel) 07/18/2008     Td (Adult), Adsorbed 06/18/2004     Zoster vaccine recombinant adjuvanted (SHINGRIX) 07/27/2020, 09/19/2020     Zoster vaccine, live 09/10/2012     The patient's believes that his last tetanus shot was given 2 year(s) ago.   The patient believes that he has had a Shingrix in the past  The patient believes that he has had a PPSV23 in the past.  The patient believes that he has had a PCV13 in the past.  The patient believes that he has had a seasonal flu vaccination this fall or winter.  The patient would like to have a no vaccinations today      No results found for this or any previous visit.]   The patient denies a family history of colon cancer.  The patient reports that he has had a colonoscopy. His  last colonoscopy was in 2018 and he  report that is was abnormal. The patient was told to have this repeated in 5 years.    The patient reports that he and his wife or partner are not using contraception as she has gone through menopause.    The patient reports a family history of prostate cancer. After  discussing the pros and cons of checking a PSA he  Does want to have this test drawn today.   The patient reports that he eats or drinks 3 servings of dairy products per day.   The patient reports that he has dental appointments approximately every 6 months.  The patient reports that he  has an eye examination approximately every 1.0 year(s).    Do you currently smoke? No  How many years have you smoked? 0   How many packs per day did you smoke on average? N/A  (if more than 30 pack year history and the patient is age 55-80 consider ordering an annual low dose radiation lung CT to screen for cancer)  (Do not order if patient has quit more than 15 years ago or has a health condition that limits life expectancy or could not tolerate curative lung surgery)  Are you interested having a lung CT to screen for lung cancer? N/A    If the patient has smoked more that 100 cigarettes, has the patient had an imaging study (US or CT) for an AAA between the ages of 65 and 75? 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 No pertinent surgical history.    Family History   Problem Relation Age of Onset     Cancer Mother      Heart Disease Father      Glaucoma No family hx of      Macular Degeneration No family hx of        Social History     Socioeconomic History     Marital status:      Spouse name: Not on file     Number of children: Not on file     Years of education: Not on file     Highest education level: Not on file   Occupational History     Not on file   Tobacco Use     Smoking status: Never Smoker      Smokeless tobacco: Never Used   Vaping Use     Vaping Use: Never used   Substance and Sexual Activity     Alcohol use: Yes     Drug use: No     Sexual activity: Yes   Other Topics Concern     Parent/sibling w/ CABG, MI or angioplasty before 65F 55M? Not Asked   Social History Narrative     Not on file     Social Determinants of Health     Financial Resource Strain:      Difficulty of Paying Living Expenses:    Food Insecurity:      Worried About Running Out of Food in the Last Year:      Ran Out of Food in the Last Year:    Transportation Needs:      Lack of Transportation (Medical):      Lack of Transportation (Non-Medical):    Physical Activity:      Days of Exercise per Week:      Minutes of Exercise per Session:    Stress:      Feeling of Stress :    Social Connections:      Frequency of Communication with Friends and Family:      Frequency of Social Gatherings with Friends and Family:      Attends Buddhism Services:      Active Member of Clubs or Organizations:      Attends Club or Organization Meetings:      Marital Status:    Intimate Partner Violence:      Fear of Current or Ex-Partner:      Emotionally Abused:      Physically Abused:      Sexually Abused:        Current Outpatient Medications   Medication Sig Dispense Refill     cetirizine (ZYRTEC) 10 MG CHEW Take 10 mg by mouth daily       EPINEPHrine, Anaphylaxis, (ADRENALIN) 1 MG/ML injection Inject Subcutaneous once       fluticasone (FLONASE) 50 MCG/ACT nasal spray Spray 1 spray into both nostrils daily       lisinopril (ZESTRIL) 10 MG tablet TAKE 1 TABLET DAILY 90 tablet 0     metFORMIN (GLUCOPHAGE-XR) 500 MG 24 hr tablet TAKE 1 TABLET DAILY WITH DINNER (NEED APPOINTMENT) 180 tablet 1     pravastatin (PRAVACHOL) 20 MG tablet TAKE 1 TABLET DAILY AT BEDTIME 90 tablet 0     sildenafil (REVATIO) 20 MG tablet Take 20 mg by mouth 3-5 tablets once daily as needed 30-60 mins before needed for ED       VENTOLIN  (90 Base) MCG/ACT inhaler INHALE 2 PUFFS  "BY MOUTH 4 TIMES DAILY 18 g 0     dutasteride (AVODART) 0.5 MG capsule Take 1 capsule (0.5 mg) by mouth daily (Patient not taking: Reported on 10/25/2021) 90 capsule 1       Review Of Systems    Genitourinary: negative and nocturia x 1-2        PHYSICAL EXAMINATION:  Blood pressure 120/69, pulse 74, temperature 97.3  F (36.3  C), temperature source Tympanic, height 5' 7\" (1.702 m), weight 182 lb (82.6 kg), SpO2 99 %.  General appearance - healthy, alert and no distress  Skin - Skin color, texture, turgor normal. No rashes or lesions.  Head - Normocephalic. No masses, lesions, tenderness or abnormalities  Eyes - conjunctivae/corneas clear. PERRL, EOM's intact. Fundi benign  Ears - External ears normal. Canals clear. TM's normal.  Nose/Sinuses - Nares normal. Septum midline. Mucosa normal. No drainage or sinus tenderness.  Oropharynx - Lips, mucosa, and tongue normal. Teeth and gums normal.  Neck - Neck supple. No adenopathy. Thyroid symmetric, normal size,  Lungs - Percussion normal. Good diaphragmatic excursion. Lungs clear  Heart - PMI normal. No lifts, heaves, or thrills. RRR. No murmurs, clicks gallops or rub  Abdomen - Abdomen soft, non-tender. BS normal. No masses, organomegaly  Extremities - Extremities normal. No deformities, edema, or skin discoloration.  Musculoskeletal - Spine ROM normal. Muscular strength intact.  Peripheral pulses - radial=4/4, femoral=4/4, popliteal=4/4, dorsalis pedis=4/4,  Neuro - Gait normal. Reflexes normal and symmetric. Sensation grossly WNL.  Genitalia - Penis normal. No urethral discharge. Scrotum normal to palpation. No hernia.  Rectal - positive findings: prostate 1+      Orders Only on 08/03/2020   Component Date Value Ref Range Status     Hemoglobin 08/03/2020 15.9  13.3 - 17.7 g/dL Final     Creatinine Urine 08/03/2020 137  mg/dL Final     Albumin Urine mg/L 08/03/2020 <5  mg/L Final     Albumin Urine mg/g Cr 08/03/2020 Unable to calculate due to low value  0 - 17 mg/g Cr " Final     Parathyroid Hormone Intact 08/03/2020 22  18 - 80 pg/mL Final     PSA 08/03/2020 2.07  0 - 4 ug/L Final    Assay Method:  Chemiluminescence using Siemens Vista analyzer     COVID-19 Malcom RBD Irena 08/03/2020 Negative   Final    Comment: No COVID-19 antibodies detected.  Patients within 10 days of symptom onset for   COVID-19 may not produce sufficient levels of detectable antibodies.    Immunocompromised COVID-19 patients may take longer to develop antibodies.       COVID-19 Malcom RBD Irena Titer 08/03/2020 Not Applicable   Final    Comment: Qualitative screen for total antibodies to COVID-19 (SARS-CoV-2) with   semi-quantitative measurement of IgG COVID-19 antibodies by endpoint titer.    COVID-19 antibodies may be elevated due to a past or current infection.  Negative results do not rule out COVID-19 infection.  Results from antibody   testing should not be used as the sole basis to diagnose or exclude SARS-CoV-2   infection or to inform infection status.  COVID-19 PCR test should be ordered   if current infection is suspected.  False positive results may occur in rare   cases due to cross-reacting antibodies.  This test was developed and its performance characteristics determined by the   [facility] ([facility]),   which is regulated under CLIA as qualified to perform high-complexity testing.    This test has not been reviewed by the FDA.  Testing performed by [facility], [facility], [address], [address], [place] [address]       25 OH Vit D2 08/03/2020 <5  ug/L Final     25 OH Vit D3 08/03/2020 66  ug/L Final     25 OH Vit D total 08/03/2020 <71  20 - 75 ug/L Final    Comment: Season, race, dietary intake, and treatment affect the concentration of   25-hydroxy-Vitamin D. Values may decrease during winter months and increase   during summer months. Values 20-29 ug/L  may indicate Vitamin D insufficiency   and values <20 ug/L may indicate Vitamin D deficiency.  This test was developed and its performance characteristics determined by the   [facility].   It has not been cleared or approved by the FDA. The laboratory is regulated   under CLIA as qualified to perform high-complexity testing. This test is used   for clinical purposes. It should not be regarded as investigational or for   research.       Hemoglobin A1C 08/03/2020 6.3* 0 - 5.6 % Final    Comment: Normal <5.7% Prediabetes 5.7-6.4%  Diabetes 6.5% or higher - adopted from ADA   consensus guidelines.       Cholesterol 08/03/2020 185  <200 mg/dL Final     Triglycerides 08/03/2020 153* <150 mg/dL Final    Comment: Borderline high:  150-199 mg/dl  High:             200-499 mg/dl  Very high:       >499 mg/dl       HDL Cholesterol 08/03/2020 40  >39 mg/dL Final     LDL Cholesterol Calculated 08/03/2020 114* <100 mg/dL Final    Comment: Above desirable:  100-129 mg/dl  Borderline High:  130-159 mg/dL  High:             160-189 mg/dL  Very high:       >189 mg/dl       Non HDL Cholesterol 08/03/2020 145* <130 mg/dL Final    Comment: Above Desirable:  130-159 mg/dl  Borderline high:  160-189 mg/dl  High:             190-219 mg/dl  Very high:       >219 mg/dl       TSH 08/03/2020 1.25  0.40 - 4.00 mU/L Final       ASSESSMENT:    ICD-10-CM    1. Screening for hyperlipidemia  Z13.220    2. Encounter for Medicare annual wellness exam  Z00.00    3. Type 2 diabetes mellitus without complication, without long-term current use of insulin (H)  E11.9    4. Stage 3 chronic kidney disease, unspecified whether stage 3a or 3b CKD (H)  N18.30    5. Hypertension goal BP (blood pressure) < 140/90  I10    6. 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  Health Maintenance Due   Topic  Date Due     ANNUAL REVIEW OF HM ORDERS  Never done     AORTIC ANEURYSM SCREENING (SYSTEM ASSIGNED)  Never done     EYE EXAM  10/14/2020     BMP  10/25/2020     A1C  02/03/2021     DIABETIC FOOT EXAM  07/27/2021     FALL RISK ASSESSMENT  07/27/2021     LIPID  08/03/2021     MICROALBUMIN  08/03/2021     Health Maintenance   Topic Date Due     ANNUAL REVIEW OF HM ORDERS  Never done     AORTIC ANEURYSM SCREENING (SYSTEM ASSIGNED)  Never done     EYE EXAM  10/14/2020     BMP  10/25/2020     A1C  02/03/2021     DIABETIC FOOT EXAM  07/27/2021     FALL RISK ASSESSMENT  07/27/2021     LIPID  08/03/2021     MICROALBUMIN  08/03/2021     MEDICARE ANNUAL WELLNESS VISIT  10/25/2022     COLORECTAL CANCER SCREENING  10/12/2023     ADVANCE CARE PLANNING  10/25/2026     DTAP/TDAP/TD IMMUNIZATION (3 - Td or Tdap) 04/08/2029     HEPATITIS C SCREENING  Completed     PHQ-2  Completed     INFLUENZA VACCINE  Completed     Pneumococcal Vaccine: 65+ Years  Completed     ZOSTER IMMUNIZATION  Completed     COVID-19 Vaccine  Completed     IPV IMMUNIZATION  Aged Out     MENINGITIS IMMUNIZATION  Aged Out         HEALTH CARE MAINTENENCE: The recommended screening tests and vaccinatons for this patient have been discussed as above.  The appropriate tests and vaccinations  have been ordered or declined by the patient. 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  He will think about it and if he wants to take it he can call for refills.  Check a fasting lipid profile  Check a PSA  Discussed calcium intake, vitamins and supplements. Recommended 1000 mg of calcium daily  Sunscreen use was recommended especially in the area of tatoos  Recommended dental exams every 6 months  Recommended eye exam every 1-2 years  Follow up in 1 year for the next preventative medical visit      Body mass index is 28.51 kg/m .           (    (E11.9) Type 2 diabetes mellitus without complication, without long-term current use of insulin (H)  Comment:   Plan: GA FOOT EXAM NO CHARGE, Albumin Random Urine         Quantitative with Creat Ratio, Comprehensive         metabolic panel, Hemoglobin A1c, Lipid panel         reflex to direct LDL Fasting, TSH with free T4         reflex        Follow up in 6 month(s)     (N18.30) Stage 3 chronic kidney disease, unspecified whether stage 3a or 3b CKD (H)  Comment:   Plan: Albumin Random Urine Quantitative with Creat         Ratio, Comprehensive metabolic panel, PSA,         screen, Hemoglobin, Parathyroid Hormone Intact            (I10) Hypertension goal BP (blood pressure) < 140/90  Comment: cough likely from lisinopril   Plan: Comprehensive metabolic panel, losartan         (COZAAR) 50 MG tablet        Follow up in 4 ks for a blood pressure recheck     (C83.98) 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

## 2021-10-25 NOTE — PROGRESS NOTES
"  SUBJECTIVE:   César Denis is a 70 year old male who presents for Preventive Visit.    {Split Bill scripting  The purpose of this visit is to discuss your medical history and prevent health problems before you are sick. You may be responsible for a co-pay, coinsurance, or deductible if your visit today includes services such as checking on a sore throat, having an x-ray or lab test, or treating and evaluating a new or existing condition :647359}  Patient has been advised of split billing requirements and indicates understanding: {Yes and No:836296}  Are you in the first 12 months of your Medicare Part B coverage?  { :983932::\"No\"}    Physical Health:    In general, how would you rate your overall physical health? { :338112}    Outside of work, how many days during the week do you exercise? { :126411}    Outside of work, approximately how many minutes a day do you exercise?{ :232192}    If you drink alcohol do you typically have >3 drinks per day or >7 drinks per week? { :642145}    Do you usually eat at least 4 servings of fruit and vegetables a day, include whole grains & fiber and avoid regularly eating high fat or \"junk\" foods? { :351932::\"Yes\"}    Do you have any problems taking medications regularly?  { :879968::\"No\"}    Do you have any side effects from medications? { :191534}    Needs assistance for the following daily activities: { :128633}    Which of the following safety concerns are present in your home?  { :600728::\"none identified\"}     Hearing impairment: { :513281}    In the past 6 months, have you been bothered by leaking of urine? { :719304}    Mental Health:    In general, how would you rate your overall mental or emotional health? { :270046}  PHQ-2 Score:      Do you feel safe in your environment? { :714099}    Have you ever done Advance Care Planning? (For example, a Health Directive, POLST, or a discussion with a medical provider or your loved ones about your wishes): { " ":771435}    Additional concerns to address?  {If YES, notify patient they may be responsible for a copay, coinsurance or deductible if the visit today includes services such as checking on a sore throat, having an x-ray or lab test, or treating and evaluating a new or existing condition :366569::\"No\"}    Fall risk:  { :356627}  {If any of the above assessments are answered yes, consider ordering appropriate referrals (Optional):300798::\"click delete button to remove this line now\"}  Cognitive Screening: { :917353}    {Do you have sleep apnea, excessive snoring or daytime drowsiness? (Optional):429788}    {Outside tests to abstract? :187528}    {additional problems to add (Optional):145250}    Reviewed and updated as needed this visit by clinical staff                 Reviewed and updated as needed this visit by Provider                Social History     Tobacco Use     Smoking status: Never Smoker     Smokeless tobacco: Never Used   Substance Use Topics     Alcohol use: Yes                           Current providers sharing in care for this patient include: {Rooming staff:  Please update Care Team in Rooming Activity, refresh this note and then delete this statement}  Patient Care Team:  Nazia Fine as PCP - General (Clinic)  Ronnie Mckeon MD as Assigned PCP    The following health maintenance items are reviewed in Epic and correct as of today:  Health Maintenance   Topic Date Due     ANNUAL REVIEW OF  ORDERS  Never done     AORTIC ANEURYSM SCREENING (SYSTEM ASSIGNED)  Never done     EYE EXAM  10/14/2020     BMP  10/25/2020     PHQ-2  01/01/2021     A1C  02/03/2021     DIABETIC FOOT EXAM  07/27/2021     FALL RISK ASSESSMENT  07/27/2021     LIPID  08/03/2021     MICROALBUMIN  08/03/2021     MEDICARE ANNUAL WELLNESS VISIT  10/25/2022     COLORECTAL CANCER SCREENING  10/12/2023     ADVANCE CARE PLANNING  07/27/2025     DTAP/TDAP/TD IMMUNIZATION (3 - Td or Tdap) 04/08/2029     HEPATITIS C SCREENING  " "Completed     INFLUENZA VACCINE  Completed     Pneumococcal Vaccine: 65+ Years  Completed     ZOSTER IMMUNIZATION  Completed     COVID-19 Vaccine  Completed     IPV IMMUNIZATION  Aged Out     MENINGITIS IMMUNIZATION  Aged Out     {Chronicprobdata (Optional):844105}  {Decision Support (Optional):611160}    ROS:  {ROS COMP:883458}    OBJECTIVE:   There were no vitals taken for this visit. Estimated body mass index is 28.35 kg/m  as calculated from the following:    Height as of 20: 5' 7\" (1.702 m).    Weight as of 20: 181 lb (82.1 kg).  EXAM:   {Exam :723584}    {Diagnostic Test Results (Optional):064518::\"Diagnostic Test Results:\",\"Labs reviewed in Epic\"}    ASSESSMENT / PLAN:   {Dia Picklist:741016}    Patient has been advised of split billing requirements and indicates understanding: {YES / NO:448157::\"Yes\"}    COUNSELING:  {Medicare Counselin}    Estimated body mass index is 28.35 kg/m  as calculated from the following:    Height as of 20: 5' 7\" (1.702 m).    Weight as of 20: 181 lb (82.1 kg).    {Weight Management Plan (ACO) Complete if BMI is abnormal-  Ages 18-64  BMI >24.9.  Age 65+ with BMI <23 or >30 (Optional):750785}    He reports that he has never smoked. He has never used smokeless tobacco.    Appropriate preventive services were discussed with this patient, including applicable screening as appropriate for cardiovascular disease, diabetes, osteopenia/osteoporosis, and glaucoma.  As appropriate for age/gender, discussed screening for colorectal cancer, prostate cancer, breast cancer, and cervical cancer. Checklist reviewing preventive services available has been given to the patient.    Reviewed patients plan of care and provided an AVS. The {CarePlan:749899} for César meets the Care Plan requirement. This Care Plan has been established and reviewed with the {PATIENT, FAMILY MEMBER, CAREGIVER:499677}.    Counseling Resources:  ATP IV Guidelines  Pooled Cohorts Equation " Calculator  Breast Cancer Risk Calculator  BRCA-Related Cancer Risk Assessment: FHS-7 Tool  FRAX Risk Assessment  ICSI Preventive Guidelines  Dietary Guidelines for Americans, 2010  USDA's MyPlate  ASA Prophylaxis  Lung CA Screening    Ronnie Mckeon MD  [facility]

## 2021-10-25 NOTE — PATIENT INSTRUCTIONS
Patient Education   Personalized Prevention Plan  You are due for the preventive services outlined below.  Your care team is available to assist you in scheduling these services.  If you have already completed any of these items, please share that information with your care team to update in your medical record.  Health Maintenance Due   Topic Date Due     ANNUAL REVIEW OF HM ORDERS  Never done     AORTIC ANEURYSM SCREENING (SYSTEM ASSIGNED)  Never done     Eye Exam  10/14/2020     Basic Metabolic Panel  10/25/2020     PHQ-2  01/01/2021     A1C Lab  02/03/2021     Diabetic Foot Exam  07/27/2021     FALL RISK ASSESSMENT  07/27/2021     Cholesterol Lab  08/03/2021     Kidney Microalbumin Urine Test  08/03/2021           Try taking 1,000 mg of acetaminophen  Every 6 hours as needed for aches and pain

## 2021-10-26 DIAGNOSIS — E78.00 ELEVATED LDL CHOLESTEROL LEVEL: ICD-10-CM

## 2021-10-26 LAB — PTH-INTACT SERPL-MCNC: 24 PG/ML (ref 18–80)

## 2021-10-26 RX ORDER — PRAVASTATIN SODIUM 40 MG
40 TABLET ORAL AT BEDTIME
Qty: 90 TABLET | Refills: 3 | Status: SHIPPED | OUTPATIENT
Start: 2021-10-26 | End: 2022-09-09

## 2021-10-26 NOTE — RESULT ENCOUNTER NOTE
César,  I have reviewed the results of the laboratory tests that we recently ordered. All of the lab work performed was normal or considered normal for you except your LDL cholesterol is a little high. We like to see it below 70. My recommendation would be to increase the pravastatin to 40 mg. Called in that prescription for you.  Sincerely,   Ronnie Mckeon MD

## 2021-11-09 ENCOUNTER — MYC MEDICAL ADVICE (OUTPATIENT)
Dept: FAMILY MEDICINE | Facility: CLINIC | Age: 71
End: 2021-11-09
Payer: COMMERCIAL

## 2021-12-20 ENCOUNTER — MYC REFILL (OUTPATIENT)
Dept: FAMILY MEDICINE | Facility: CLINIC | Age: 71
End: 2021-12-20

## 2021-12-20 ENCOUNTER — ALLIED HEALTH/NURSE VISIT (OUTPATIENT)
Dept: FAMILY MEDICINE | Facility: CLINIC | Age: 71
End: 2021-12-20
Payer: COMMERCIAL

## 2021-12-20 VITALS — SYSTOLIC BLOOD PRESSURE: 128 MMHG | DIASTOLIC BLOOD PRESSURE: 82 MMHG | HEART RATE: 97 BPM

## 2021-12-20 DIAGNOSIS — I10 HYPERTENSION GOAL BP (BLOOD PRESSURE) < 140/90: Primary | ICD-10-CM

## 2021-12-20 DIAGNOSIS — I10 HYPERTENSION GOAL BP (BLOOD PRESSURE) < 140/90: ICD-10-CM

## 2021-12-20 PROCEDURE — 99207 PR NO CHARGE NURSE ONLY: CPT

## 2021-12-20 NOTE — PROGRESS NOTES
I met with César Denis at the request of DR. Mckeon to recheck his blood pressure.  Blood pressure medications on the med list were reviewed with patient.    Patient has taken all medications as per usual regimen: Yes  Patient reports tolerating them without any issues or concerns: Yes    Vitals:    12/20/21 0933   BP: 128/82   Pulse: 97       Blood pressure was taken, previous encounter was reviewed, recorded blood pressure below 140/90.  Patient was discharged and the note will be sent to the provider for final review.

## 2021-12-21 RX ORDER — LOSARTAN POTASSIUM 50 MG/1
50 TABLET ORAL DAILY
Qty: 30 TABLET | Refills: 0 | Status: SHIPPED | OUTPATIENT
Start: 2021-12-21 | End: 2022-01-18

## 2021-12-21 NOTE — TELEPHONE ENCOUNTER
BP Readings from Last 6 Encounters:   12/20/21 128/82   10/25/21 120/69   07/27/20 118/78   10/25/19 112/78   04/08/19 127/78   12/03/18 115/72     Please advise on refill.  New medication due to side effects of Lisinopril.   Buffy Hardy RN

## 2022-01-18 DIAGNOSIS — I10 HYPERTENSION GOAL BP (BLOOD PRESSURE) < 140/90: ICD-10-CM

## 2022-01-18 RX ORDER — LOSARTAN POTASSIUM 50 MG/1
TABLET ORAL
Qty: 90 TABLET | Refills: 3 | Status: SHIPPED | OUTPATIENT
Start: 2022-01-18 | End: 2022-06-29

## 2022-01-18 NOTE — TELEPHONE ENCOUNTER
Routing refill request to provider for review/approval because:  Labs out of range:    Creatinine   Date Value Ref Range Status   10/25/2021 1.45 (H) 0.66 - 1.25 mg/dL Final   10/25/2019 1.30 (H) 0.66 - 1.25 mg/dL Final     Buffy Hardy RN

## 2022-02-25 ENCOUNTER — TRANSFERRED RECORDS (OUTPATIENT)
Dept: HEALTH INFORMATION MANAGEMENT | Facility: CLINIC | Age: 72
End: 2022-02-25
Payer: COMMERCIAL

## 2022-03-21 ENCOUNTER — OFFICE VISIT (OUTPATIENT)
Dept: ORTHOPEDICS | Facility: CLINIC | Age: 72
End: 2022-03-21
Payer: COMMERCIAL

## 2022-03-21 ENCOUNTER — NURSE TRIAGE (OUTPATIENT)
Dept: NURSING | Facility: CLINIC | Age: 72
End: 2022-03-21
Payer: COMMERCIAL

## 2022-03-21 ENCOUNTER — ANCILLARY PROCEDURE (OUTPATIENT)
Dept: GENERAL RADIOLOGY | Facility: CLINIC | Age: 72
End: 2022-03-21
Attending: PEDIATRICS
Payer: COMMERCIAL

## 2022-03-21 VITALS
HEIGHT: 67 IN | WEIGHT: 182 LBS | BODY MASS INDEX: 28.56 KG/M2 | SYSTOLIC BLOOD PRESSURE: 128 MMHG | DIASTOLIC BLOOD PRESSURE: 74 MMHG

## 2022-03-21 DIAGNOSIS — M25.512 LEFT SHOULDER PAIN: ICD-10-CM

## 2022-03-21 DIAGNOSIS — S42.002A CLOSED NONDISPLACED FRACTURE OF LEFT CLAVICLE, UNSPECIFIED PART OF CLAVICLE, INITIAL ENCOUNTER: Primary | ICD-10-CM

## 2022-03-21 LAB — RADIOLOGIST FLAGS: ABNORMAL

## 2022-03-21 PROCEDURE — 99204 OFFICE O/P NEW MOD 45 MIN: CPT | Performed by: PEDIATRICS

## 2022-03-21 PROCEDURE — 73000 X-RAY EXAM OF COLLAR BONE: CPT | Mod: LT | Performed by: RADIOLOGY

## 2022-03-21 RX ORDER — HYDROCODONE BITARTRATE AND ACETAMINOPHEN 5; 325 MG/1; MG/1
1 TABLET ORAL EVERY 8 HOURS PRN
Qty: 10 TABLET | Refills: 0 | Status: SHIPPED | OUTPATIENT
Start: 2022-03-21 | End: 2022-03-24

## 2022-03-21 NOTE — TELEPHONE ENCOUNTER
"RN Triage:    Spoke with 71 yr old César who c/o:    MELVIN elena was fractured in 1980 and has now increased pain in the area.    On vacation 3 weeks ago and was riding a trolley car, holding onto a strap.  L collarbone/shoulder area seemed sore afterwards.    2 nights ago developed \"excruciating\" pain in the L deiterbone when raising his arm in bed; felt a pop.     Unable to get out of his pajamas in 2 days.    Rates current pain a 10 on a 0-10 pain scale.    Has tried ice pack, Tylenol and ibuprofen.  Helped minimally.    PLAN:  Advised OV now per protocol.  Pt would like to see Ortho specialty.  Transferred to PSR to schedule.  WIC or Ortho [facility] information given to patient as well.  Advised to call back if further questions/concerns.  Pt voiced understanding.    Kristina Simmons RN  [facility]        Reason for Disposition    Followed an injury to shoulder    Additional Information    Negative: Shock suspected (e.g., cold/pale/clammy skin, too weak to stand, low BP, rapid pulse)    Negative: Similar pain previously and it was from 'heart attack'    Negative: Similar pain previously and it was from 'angina' and not relieved by nitroglycerin    Negative: Sounds like a life-threatening emergency to the triager    Negative: Chest pain    Negative: Major bleeding (actively dripping or spurting) that can't be stopped    Negative: Amputation or bone sticking through the skin    Negative: Bullet, stabbed by knife or other serious penetrating wound    Negative: Serious injury with multiple fractures (broken bones)    Negative: Sounds like a life-threatening emergency to the triager    Negative: Wound looks infected    Negative: Major injury from dangerous force or speed (e.g., MVA, fall > 10 feet or 3 meters)    Negative: Bullet wound, knife wound or other penetrating object    Negative: Puncture wound that sounds life-threatening to the triager    Negative: Severe difficulty breathing (e.g., struggling " for each breath, speaks in single words)    Negative: Major bleeding (actively dripping or spurting) that can't be stopped    Negative: Open wound of the chest with sound of moving air (sucking wound) or visible air bubbles    Negative: Shock suspected (e.g., cold/pale/clammy skin, too weak to stand, low BP, rapid pulse)    Negative: Coughing or spitting up blood    Negative: Bluish (or gray) lips or face    Negative: Unconscious or was unconscious    Negative: Sounds like a life-threatening emergency to the triager    Negative: Chest pain not from an injury    Negative: Wound looks infected    Negative: SEVERE chest pain    Negative: Difficulty breathing and not severe    Negative: Skin is split open or gaping (or length > 1/2 inch or 12 mm)    Negative: Bleeding won't stop after 10 minutes of direct pressure (using correct technique)    Negative: Sounds like a serious injury to the triager    Negative: Can't take a deep breath but no respiratory distress (e.g., hurts to take a deep breath)    Negative: Shallow puncture wound    Collarbone is painful and difficulty raising arm    Protocols used: SHOULDER PAIN-A-OH, SHOULDER INJURY-A-OH, CHEST INJURY-A-OH

## 2022-03-21 NOTE — LETTER
3/21/2022         RE: César Denis  [address]  [place] [address]        Dear Colleague,    Thank you for referring your patient, César Denis, to the [facility] [place]. Please see a copy of my visit note below.    ASSESSMENT & PLAN    César was seen today for pain.    Diagnoses and all orders for this visit:    Closed nondisplaced fracture of left clavicle, unspecified part of clavicle, initial encounter  -     Cancel: XR Shoulder Left G/E 3 Views; Future  -     CT Shoulder Left w/o Contrast; Future  -     HYDROcodone-acetaminophen (NORCO) 5-325 MG tablet; Take 1 tablet by mouth every 8 hours as needed for severe pain      This issue is acute and Unchanged.    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 Will obtain CT to evaluate. Discussed supportive care in the interim.    Plan:  - Today's Plan of Care:  CT of the Left Shoulder - Call [phone] to schedule MRI  Sling for Comfort  Continue with relative rest and activity modification, Ice, Compression, and Elevation.  Can apply ice 10-15 minutes 3-4 times per day as needed. OTC medications as needed - monitor daily dose of acetaminophen with prescription    Prescription Medication as directed: Hydrocodone/Acetaminophen for breakthrough pain (I reviewed the mechanism of action as well as risk/benefit profile of medications. Questions answered.)    -We also discussed other future treatment options:  Referral to Orthopedic Surgery    Follow Up: 1 week - will call or MyChart if plan changes with XR and CT results    Concerning signs and symptoms were reviewed.  The patient expressed understanding of this management plan and all questions were answered at this time.    Alyssa Lopez MD [facility] [place]  Sports Medicine Physician  [facility]    -----  Chief Complaint   Patient presents with     Left Shoulder - Pain       SUBJECTIVE  César GARCIA  "Cira is a/an 71 year old male who is seen as a self referral for evaluation of left shoulder pain    The patient is seen by themselves.    Onset: March 1st, 21 days ago, he was on vacation reaching up and holding on to a rail on a trolley, states it was sore, but then a few day(s) ago. patient describes injury as laying on his arm while sleeping.  He felt a pop while attempting to sleep. Now has severe pain and swelling mid clavicle.    Location of Pain: left shoulder; anterior, clavicle   Worsened by: any movement of the shoulder  Better with: keeping it at his side, not moving it  Treatments tried: rest/activity avoidance, ice, heat, Tylenol and ibuprofen  Associated symptoms: swelling and sharp stabbing pain, deformity over clavicle area, discoloration    Orthopedic/Surgical history: YES - Date: has had clavicle fracture many years ago that he states healed.  - Has a history of non-Hodgkin's lymphoma, hasn't needed treatment in 2 years  - Did have a scan at the end of February that was reviewed with Oncologist, lymph nodes smaller. Per scanned notes, CT neck, chest, abdomen pelvis.  - Had Lymph node biopsy left clavicle area    Social History/Occupation: [employer]    No family history pertinent to patient's problem today.    REVIEW OF SYSTEMS:  Review of Systems  Skin: no bruising, yes swelling  Musculoskeletal: as above  Neurologic: no numbness, paresthesias  Remainder of review of systems is negative including constitutional, CV, pulmonary, GI, except as noted in HPI or medical history.    OBJECTIVE:  /74   Ht 1.702 m (5' 7\")   Wt 82.6 kg (182 lb)   BMI 28.51 kg/m     General: healthy, alert and in no distress  HEENT: no scleral icterus or conjunctival erythema  Skin: no suspicious lesions or rash. No jaundice.  CV: distal perfusion intact  Resp: normal respiratory effort without conversational dyspnea   Psych: normal mood and affect  Gait: normal steady gait with appropriate coordination " and balance  Neuro: Normal light sensory exam of upper extremity    Bilateral Shoulder exam  Inspection and Posture/Skin       Well healed left clavicular scar, swelling mid clavicle, no skin tenting    Tender:        Mid clavicular tenderness    Non Tender:       remainder of shoulder left    ROM:        Pain with shoulder ROM    Painful motions:       All    Strength:        Able to fire deltoid, bicep and tricep    Sensation:        normal sensation over shoulder and upper extremity     RADIOLOGY:  I independently ordered, visualized and reviewed these images with the patient  2 XR views of left clavicle reviewed: mid-shaft clavicle fracture, abnormal bone appearance at fracture site  - will follow official read      Review of prior external note(s) from - Outside records from Oncology  Review of the result(s) of each unique test - XRay             Again, thank you for allowing me to participate in the care of your patient.        Sincerely,        Alyssa Lopez MD

## 2022-03-21 NOTE — PROGRESS NOTES
ASSESSMENT & PLAN    César was seen today for pain.    Diagnoses and all orders for this visit:    Closed nondisplaced fracture of left clavicle, unspecified part of clavicle, initial encounter  -     Cancel: XR Shoulder Left G/E 3 Views; Future  -     CT Shoulder Left w/o Contrast; Future  -     HYDROcodone-acetaminophen (NORCO) 5-325 MG tablet; Take 1 tablet by mouth every 8 hours as needed for severe pain      This issue is acute and Unchanged.    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 Will obtain CT to evaluate. Discussed supportive care in the interim.    Plan:  - Today's Plan of Care:  CT of the Left Shoulder - Call [phone] to schedule MRI  Sling for Comfort  Continue with relative rest and activity modification, Ice, Compression, and Elevation.  Can apply ice 10-15 minutes 3-4 times per day as needed. OTC medications as needed - monitor daily dose of acetaminophen with prescription    Prescription Medication as directed: Hydrocodone/Acetaminophen for breakthrough pain (I reviewed the mechanism of action as well as risk/benefit profile of medications. Questions answered.)    -We also discussed other future treatment options:  Referral to Orthopedic Surgery    Follow Up: 1 week - will call or MyChart if plan changes with XR and CT results    Concerning signs and symptoms were reviewed.  The patient expressed understanding of this management plan and all questions were answered at this time.    Alyssa Lopez MD [facility]  Sports Medicine Physician  [facility]    -----  Chief Complaint   Patient presents with     Left Shoulder - Pain       SUBJECTIVE  César Denis is a/an 71 year old male who is seen as a self referral for evaluation of left shoulder pain    The patient is seen by themselves.    Onset: March 1st, 21 days ago, he was on vacation reaching up and holding on to a rail on a trolley, states it was sore, but  "then a few day(s) ago. patient describes injury as laying on his arm while sleeping.  He felt a pop while attempting to sleep. Now has severe pain and swelling mid clavicle.    Location of Pain: left shoulder; anterior, clavicle   Worsened by: any movement of the shoulder  Better with: keeping it at his side, not moving it  Treatments tried: rest/activity avoidance, ice, heat, Tylenol and ibuprofen  Associated symptoms: swelling and sharp stabbing pain, deformity over clavicle area, discoloration    Orthopedic/Surgical history: YES - Date: has had clavicle fracture many years ago that he states healed.  - Has a history of non-Hodgkin's lymphoma, hasn't needed treatment in 2 years  - Did have a scan at the end of February that was reviewed with Oncologist, lymph nodes smaller. Per scanned notes, CT neck, chest, abdomen pelvis.  - Had Lymph node biopsy left clavicle area    Social History/Occupation: [employer]    No family history pertinent to patient's problem today.    REVIEW OF SYSTEMS:  Review of Systems  Skin: no bruising, yes swelling  Musculoskeletal: as above  Neurologic: no numbness, paresthesias  Remainder of review of systems is negative including constitutional, CV, pulmonary, GI, except as noted in HPI or medical history.    OBJECTIVE:  /74   Ht 1.702 m (5' 7\")   Wt 82.6 kg (182 lb)   BMI 28.51 kg/m     General: healthy, alert and in no distress  HEENT: no scleral icterus or conjunctival erythema  Skin: no suspicious lesions or rash. No jaundice.  CV: distal perfusion intact  Resp: normal respiratory effort without conversational dyspnea   Psych: normal mood and affect  Gait: normal steady gait with appropriate coordination and balance  Neuro: Normal light sensory exam of upper extremity    Bilateral Shoulder exam  Inspection and Posture/Skin       Well healed left clavicular scar, swelling mid clavicle, no skin tenting    Tender:        Mid clavicular tenderness    Non Tender:       " remainder of shoulder left    ROM:        Pain with shoulder ROM    Painful motions:       All    Strength:        Able to fire deltoid, bicep and tricep    Sensation:        normal sensation over shoulder and upper extremity     RADIOLOGY:  I independently ordered, visualized and reviewed these images with the patient  2 XR views of left clavicle reviewed: mid-shaft clavicle fracture, abnormal bone appearance at fracture site  - will follow official read      Review of prior external note(s) from - Outside records from Oncology  Review of the result(s) of each unique test - XRay

## 2022-03-21 NOTE — PATIENT INSTRUCTIONS
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 Will obtain CT to evaluate. Discussed supportive care in the interim.    Plan:  - Today's Plan of Care:  CT of the Left Shoulder - Call [phone] to schedule MRI  Sling for Comfort  Continue with relative rest and activity modification, Ice, Compression, and Elevation.  Can apply ice 10-15 minutes 3-4 times per day as needed. OTC medications as needed - monitor daily dose of acetaminophen with prescription    Prescription Medication as directed: Hydrocodone/Acetaminophen for breakthrough pain (I reviewed the mechanism of action as well as risk/benefit profile of medications. Questions answered.)    -We also discussed other future treatment options:  Referral to Orthopedic Surgery    Follow Up: 1 week - will call or MyChart if plan changes with XR and CT results    If you have any further questions for your physician or physician s care team you can call [phone] and use option 3 to leave a voice message. Calls received during business hours will be returned same day.

## 2022-03-23 ENCOUNTER — TELEPHONE (OUTPATIENT)
Dept: ORTHOPEDICS | Facility: CLINIC | Age: 72
End: 2022-03-23

## 2022-03-23 ENCOUNTER — ANCILLARY PROCEDURE (OUTPATIENT)
Dept: CT IMAGING | Facility: CLINIC | Age: 72
End: 2022-03-23
Attending: PEDIATRICS
Payer: COMMERCIAL

## 2022-03-23 DIAGNOSIS — S42.002A CLOSED NONDISPLACED FRACTURE OF LEFT CLAVICLE, UNSPECIFIED PART OF CLAVICLE, INITIAL ENCOUNTER: Primary | ICD-10-CM

## 2022-03-23 DIAGNOSIS — S42.002A CLOSED NONDISPLACED FRACTURE OF LEFT CLAVICLE, UNSPECIFIED PART OF CLAVICLE, INITIAL ENCOUNTER: ICD-10-CM

## 2022-03-23 PROCEDURE — 73200 CT UPPER EXTREMITY W/O DYE: CPT | Mod: LT | Performed by: RADIOLOGY

## 2022-03-23 NOTE — TELEPHONE ENCOUNTER
CT results reviewed with Radiology and I called patient to discuss.  Lytic/destructive lesion with associated pathologic fracture.    Recommended close follow up with Oncology - patient requested I call to speak with Oncologist as well, Dr. Blackburn was notified, she will follow up with the patient.    Recommend follow up with Orthopedic Oncology as well - referral placed.    Continue supportive care.  Concerning signs and symptoms reviewed.    Alyssa Lopez MD

## 2022-03-23 NOTE — RESULT ENCOUNTER NOTE
Reviewed and notified of results via [employer].Tommy ROQUE,  Please see the results of your CT that we discussed over the phone.  I spoke with Dr. Blackburn and reviewed these results - she would like to see you in clinic.  I also placed the orthopedic referral - you will receive a phone call to schedule this appointment.  You can continue to sling, ice and other supportive care in the meantime.  Let us know if you have questions.    Alyssa Lopez MD, CAQ  Primary Care Sports Medicine  [facility]

## 2022-03-24 ENCOUNTER — TELEPHONE (OUTPATIENT)
Dept: ORTHOPEDICS | Facility: CLINIC | Age: 72
End: 2022-03-24
Payer: COMMERCIAL

## 2022-03-24 NOTE — TELEPHONE ENCOUNTER
3/28 Dr Eller 7am, 8am, 9am  3/29 Dr Ordoñez viritual appt 30 min long  3/31 Dr Eller 10am      Dx: Closed nondisplaced fracture of left clavicle - Lytic lesion     Referred by Dr Alyssa Chin ATC

## 2022-03-28 NOTE — TELEPHONE ENCOUNTER
Clinic coordinator spoke with pt, he said that he does not want to schedule an appointment with Rafita or Tiago until he meets with his Oncologist.       Rossi Chin ATC

## 2022-04-29 ENCOUNTER — TELEPHONE (OUTPATIENT)
Dept: ORTHOPEDICS | Facility: CLINIC | Age: 72
End: 2022-04-29
Payer: COMMERCIAL

## 2022-04-30 ASSESSMENT — ENCOUNTER SYMPTOMS
BACK PAIN: 0
MUSCLE WEAKNESS: 0
MYALGIAS: 0
ARTHRALGIAS: 0
JOINT SWELLING: 0
MUSCLE CRAMPS: 0
STIFFNESS: 0
NECK PAIN: 0

## 2022-05-02 ENCOUNTER — PRE VISIT (OUTPATIENT)
Dept: ORTHOPEDICS | Facility: CLINIC | Age: 72
End: 2022-05-02

## 2022-05-02 ENCOUNTER — ANCILLARY PROCEDURE (OUTPATIENT)
Dept: GENERAL RADIOLOGY | Facility: CLINIC | Age: 72
End: 2022-05-02
Attending: PHYSICIAN ASSISTANT
Payer: COMMERCIAL

## 2022-05-02 ENCOUNTER — OFFICE VISIT (OUTPATIENT)
Dept: ORTHOPEDICS | Facility: CLINIC | Age: 72
End: 2022-05-02
Payer: COMMERCIAL

## 2022-05-02 VITALS — BODY MASS INDEX: 28.93 KG/M2 | WEIGHT: 180 LBS | HEIGHT: 66 IN

## 2022-05-02 DIAGNOSIS — M25.512 CHRONIC LEFT SHOULDER PAIN: ICD-10-CM

## 2022-05-02 DIAGNOSIS — G89.29 CHRONIC LEFT SHOULDER PAIN: ICD-10-CM

## 2022-05-02 DIAGNOSIS — M25.512 CHRONIC LEFT SHOULDER PAIN: Primary | ICD-10-CM

## 2022-05-02 DIAGNOSIS — G89.29 CHRONIC LEFT SHOULDER PAIN: Primary | ICD-10-CM

## 2022-05-02 PROCEDURE — 99204 OFFICE O/P NEW MOD 45 MIN: CPT | Performed by: ORTHOPAEDIC SURGERY

## 2022-05-02 PROCEDURE — 73000 X-RAY EXAM OF COLLAR BONE: CPT | Mod: LT | Performed by: RADIOLOGY

## 2022-05-02 NOTE — LETTER
5/2/2022         RE: César Denis  [address]  [place] [address]        Dear Colleague,    Thank you for referring your patient, César Denis, to the [facility]. Please see a copy of my visit note below.        [facility], Orthopaedic Oncology Surgery Consultation  by Bimal Eller M.D.    César Denis MRN# [id number]    YOB: 1950     Requesting physician: Nathanael Lopez MD, MBBS Oncology, [facility]            Assessment and Plan:   Assessment:    Pathologic fracture of left midshaft clavicle.      Remote history of non-Hodgkin's lymphoma from 2012.   Rule out transformed lymphoma or other metastatic disease.     Plan:  1. Based on PET/CT imaging viewed today, no other large lesion identified.  Reported results of pet imaging unavailable at this time.  Therefore I would advise that we consider open biopsy of left clavicle if tissue diagnosis is warranted.  Alternatively, a interventional radiology CT-guided lymph node biopsy might be preferential.  I will attempt to reach Dr. Nelia Blackburn to discuss.  2. Addendum 1407: PET/CT demonstrates hypermetabolic findings of the left clavicle fracture and in addition enlarged axillary lymph nodes as well as multiple other areas of his abdomen.  3. I spoke with Dr. Blackburn and discussed the management options.  He will be getting an MRI next week.  Given the findings on PET/CT and the risk for refracture of his clavicle and difficulty of histologic interpretation in the setting of a healing fracture, we will first attempt to perform CT-guided biopsy of his left axillary lymph node (3 cm, max SUV 10.8) and if this is not diagnostic or does not provide sufficient information for management and treatment decisions, then Dr. Blackburn will contact me and we will proceed with an open biopsy of the patient's left clavicle.    Bimal Eller MD  [facility] Professor  Oncology  and Adult Reconstructive Surgery  Dept Orthopaedic Surgery, [facility]  [phone] office, [phone] pager  [email or web address]             History of Present Illness:   71 year old male who presents today for evaluation of his pathologic left clavicle fracture.  Patient has history of non-Hodgkin's lymphoma was first presented in 2012.  He said he has been stable and in remission as of recently.  In March 1, 2022 he was on a trolley holding a stabilizer when he felt a pop in the left shoulder.  A few weeks ago by when he was laying in bed with and moved awkwardly and felt a pop in the shoulder.  Following the second pop he felt severe pain and swelling in the clavicle.  He was seen by Dr. Lopez in our walk-in clinic and was diagnosed with a clavicle fracture.  CT was ordered which showed a large lytic lesion in the clavicle consistent with a pathologic fracture.  Is recommended he follow-up with our clinic for further evaluation.  Prior to seeing us the patient wished to see his oncologist Dr. Blackburn at [facility].  It was recommended he get a PET scan to evaluate for any other lesions.  Patient just today and still says he has mild to moderate pain in his left shoulder but has improved since initial injury.  He was told by his oncologist that we should get an MRI of the left shoulder which she is requesting today. He has been not been using a sling.  He denies any numbness, tingling or weakness in the left upper extremity.      Current symptoms:  Problem: lytic lesion of left clavicle   Onset and duration: 3/20/22  Awakens from sleep due to sx's:  Yes  Precipitating Injury:  No    Other joints or sites painful:  No  Fever: No  Appetite change or weight loss: No  History of prior or existing cancer: Yes    Background history:  DX:  1. Non Hodgkin's Lymphoma - 2012   2. Type 2 diabetes mellitus  3. Hypertension  4. Chronic kidney disease stage III    TREATMENTS:  1. No Surgical History             "Physical Exam:     EXAMINATION pertinent findings:   PSYCH: Pleasant, healthy-appearing, alert, oriented x3, cooperative. Normal mood and affect.  VITAL SIGNS: Height 1.676 m (5' 6\"), weight 81.6 kg (180 lb)..  Reviewed nursing intake notes.   Body mass index is 29.05 kg/m .  RESP: non labored breathing   ABD: benign, soft, non-tender, no acute peritoneal findings  SKIN: grossly normal   LYMPHATIC: grossly normal, no adenopathy, no extremity edema  NEURO: grossly normal , no motor deficits  VASCULAR: satisfactory perfusion of all extremities   MUSCULOSKELETAL:   Patient is awake alert in no acute distress today.  Palpable deformity of the midshaft of the left clavicle with moderate discomfort.  Forward flexion and abduction to 180 degrees.  Pain with endpoint abduction.  Positive scarf sign.   Left Upper Extremity:  Motor intact distally with finger flexion/extension/intrinsics/EPL, OK sign  5/5 strength. SILT ax/m/r/u nerve distributions. Radial pulse palpable, 2+.                  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       Past Surgical History:   No past surgical history on file.         Social History:     Social History     Socioeconomic History     Marital status:      Spouse name: Not on file     Number of children: Not on file     Years of education: " Not on file     Highest education level: Not on file   Occupational History     Not on file   Tobacco Use     Smoking status: Never Smoker     Smokeless tobacco: Never Used   Vaping Use     Vaping Use: Never used   Substance and Sexual Activity     Alcohol use: Yes     Drug use: No     Sexual activity: Yes   Other Topics Concern     Parent/sibling w/ CABG, MI or angioplasty before 65F 55M? Not Asked   Social History Narrative     Not on file     Social Determinants of Health     Financial Resource Strain: Not on file   Food Insecurity: Not on file   Transportation Needs: Not on file   Physical Activity: Not on file   Stress: Not on file   Social Connections: Not on file   Intimate Partner Violence: Not on file   Housing Stability: Not on file            Family History:       Family History   Problem Relation Age of Onset     Cancer Mother      Heart Disease Father      Glaucoma No family hx of      Macular Degeneration No family hx of             Medications:     Current Outpatient Medications   Medication Sig     cetirizine (ZYRTEC) 10 MG CHEW Take 10 mg by mouth daily     losartan (COZAAR) 50 MG tablet TAKE 1 TABLET DAILY     metFORMIN (GLUCOPHAGE-XR) 500 MG 24 hr tablet TAKE 1 TABLET DAILY WITH DINNER (NEED APPOINTMENT)     pravastatin (PRAVACHOL) 40 MG tablet Take 1 tablet (40 mg) by mouth At Bedtime     VENTOLIN  (90 Base) MCG/ACT inhaler INHALE 2 PUFFS BY MOUTH 4 TIMES DAILY     dutasteride (AVODART) 0.5 MG capsule Take 1 capsule (0.5 mg) by mouth daily (Patient not taking: No sig reported)     EPINEPHrine, Anaphylaxis, (ADRENALIN) 1 MG/ML injection Inject Subcutaneous once (Patient not taking: Reported on 5/2/2022)     fluticasone (FLONASE) 50 MCG/ACT nasal spray Spray 1 spray into both nostrils daily     sildenafil (REVATIO) 20 MG tablet Take 20 mg by mouth 3-5 tablets once daily as needed 30-60 mins before needed for ED (Patient not taking: Reported on 5/2/2022)     No current facility-administered  medications for this visit.              Review of Systems:   A comprehensive 10 point review of systems (constitutional, ENT, cardiac, peripheral vascular, lymphatic, respiratory, GI, , Musculoskeletal, skin, Neurological) was performed and found to be negative except as described in this note.     See intake form completed by patient    Answers for HPI/ROS submitted by the patient on 4/30/2022  General Symptoms: No  Skin Symptoms: No  HENT Symptoms: No  EYE SYMPTOMS: No  HEART SYMPTOMS: No  LUNG SYMPTOMS: No  INTESTINAL SYMPTOMS: No  URINARY SYMPTOMS: No  REPRODUCTIVE SYMPTOMS: No  SKELETAL SYMPTOMS: Yes  BLOOD SYMPTOMS: No  NERVOUS SYSTEM SYMPTOMS: No  MENTAL HEALTH SYMPTOMS: No  Back pain: No  Muscle aches: No  Neck pain: No  Swollen joints: No  Joint pain: No  Bone pain: Yes  Muscle cramps: No  Muscle weakness: No  Joint stiffness: No  Bone fracture: Yes

## 2022-05-02 NOTE — PROGRESS NOTES
[facility], Orthopaedic Oncology Surgery Consultation  by Bimal Eller M.D.    César eDnis MRN# [id number]    YOB: 1950     Requesting physician: Nathanael Lopez MD, MBBS Oncology, [facility]            Assessment and Plan:   Assessment:    Pathologic fracture of left midshaft clavicle.      Remote history of non-Hodgkin's lymphoma from 2012.   Rule out transformed lymphoma or other metastatic disease.     Plan:  1. Based on PET/CT imaging viewed today, no other large lesion identified.  Reported results of pet imaging unavailable at this time.  Therefore I would advise that we consider open biopsy of left clavicle if tissue diagnosis is warranted.  Alternatively, a interventional radiology CT-guided lymph node biopsy might be preferential.  I will attempt to reach Dr. Nelia Blackburn to discuss.  2. Addendum 1407: PET/CT demonstrates hypermetabolic findings of the left clavicle fracture and in addition enlarged axillary lymph nodes as well as multiple other areas of his abdomen.  3. I spoke with Dr. Blackburn and discussed the management options.  He will be getting an MRI next week.  Given the findings on PET/CT and the risk for refracture of his clavicle and difficulty of histologic interpretation in the setting of a healing fracture, we will first attempt to perform CT-guided biopsy of his left axillary lymph node (3 cm, max SUV 10.8) and if this is not diagnostic or does not provide sufficient information for management and treatment decisions, then Dr. Blackburn will contact me and we will proceed with an open biopsy of the patient's left clavicle.    Bimal Eller MD [facility] Professor  Oncology and Adult Reconstructive Surgery  Dept Orthopaedic Surgery, [facility]  [phone] office, [phone] pager  [email or web address]             History of Present Illness:   71 year old male who presents today for evaluation of his pathologic left clavicle  "fracture.  Patient has history of non-Hodgkin's lymphoma was first presented in 2012.  He said he has been stable and in remission as of recently.  In March 1, 2022 he was on a trolley holding a stabilizer when he felt a pop in the left shoulder.  A few weeks ago by when he was laying in bed with and moved awkwardly and felt a pop in the shoulder.  Following the second pop he felt severe pain and swelling in the clavicle.  He was seen by Dr. Lopez in our walk-in clinic and was diagnosed with a clavicle fracture.  CT was ordered which showed a large lytic lesion in the clavicle consistent with a pathologic fracture.  Is recommended he follow-up with our clinic for further evaluation.  Prior to seeing us the patient wished to see his oncologist Dr. Blackburn at [place] [facility].  It was recommended he get a PET scan to evaluate for any other lesions.  Patient just today and still says he has mild to moderate pain in his left shoulder but has improved since initial injury.  He was told by his oncologist that we should get an MRI of the left shoulder which she is requesting today. He has been not been using a sling.  He denies any numbness, tingling or weakness in the left upper extremity.      Current symptoms:  Problem: lytic lesion of left clavicle   Onset and duration: 3/20/22  Awakens from sleep due to sx's:  Yes  Precipitating Injury:  No    Other joints or sites painful:  No  Fever: No  Appetite change or weight loss: No  History of prior or existing cancer: Yes    Background history:  DX:  1. Non Hodgkin's Lymphoma - 2012   2. Type 2 diabetes mellitus  3. Hypertension  4. Chronic kidney disease stage III    TREATMENTS:  1. No Surgical History            Physical Exam:     EXAMINATION pertinent findings:   PSYCH: Pleasant, healthy-appearing, alert, oriented x3, cooperative. Normal mood and affect.  VITAL SIGNS: Height 1.676 m (5' 6\"), weight 81.6 kg (180 lb)..  Reviewed nursing intake notes.   Body mass index is " 29.05 kg/m .  RESP: non labored breathing   ABD: benign, soft, non-tender, no acute peritoneal findings  SKIN: grossly normal   LYMPHATIC: grossly normal, no adenopathy, no extremity edema  NEURO: grossly normal , no motor deficits  VASCULAR: satisfactory perfusion of all extremities   MUSCULOSKELETAL:   Patient is awake alert in no acute distress today.  Palpable deformity of the midshaft of the left clavicle with moderate discomfort.  Forward flexion and abduction to 180 degrees.  Pain with endpoint abduction.  Positive scarf sign.   Left Upper Extremity:  Motor intact distally with finger flexion/extension/intrinsics/EPL, OK sign  5/5 strength. SILT ax/m/r/u nerve distributions. Radial pulse palpable, 2+.                  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       Past Surgical History:   No past surgical history on file.         Social History:     Social History     Socioeconomic History     Marital status:      Spouse name: Not on file     Number of children: Not on file     Years of education: Not on file     Highest education level: Not on file   Occupational History     Not on file   Tobacco Use     Smoking status: Never Smoker     Smokeless tobacco: Never Used   Vaping Use     Vaping Use: Never used   Substance and Sexual Activity     Alcohol use:  Yes     Drug use: No     Sexual activity: Yes   Other Topics Concern     Parent/sibling w/ CABG, MI or angioplasty before 65F 55M? Not Asked   Social History Narrative     Not on file     Social Determinants of Health     Financial Resource Strain: Not on file   Food Insecurity: Not on file   Transportation Needs: Not on file   Physical Activity: Not on file   Stress: Not on file   Social Connections: Not on file   Intimate Partner Violence: Not on file   Housing Stability: Not on file            Family History:       Family History   Problem Relation Age of Onset     Cancer Mother      Heart Disease Father      Glaucoma No family hx of      Macular Degeneration No family hx of             Medications:     Current Outpatient Medications   Medication Sig     cetirizine (ZYRTEC) 10 MG CHEW Take 10 mg by mouth daily     losartan (COZAAR) 50 MG tablet TAKE 1 TABLET DAILY     metFORMIN (GLUCOPHAGE-XR) 500 MG 24 hr tablet TAKE 1 TABLET DAILY WITH DINNER (NEED APPOINTMENT)     pravastatin (PRAVACHOL) 40 MG tablet Take 1 tablet (40 mg) by mouth At Bedtime     VENTOLIN  (90 Base) MCG/ACT inhaler INHALE 2 PUFFS BY MOUTH 4 TIMES DAILY     dutasteride (AVODART) 0.5 MG capsule Take 1 capsule (0.5 mg) by mouth daily (Patient not taking: No sig reported)     EPINEPHrine, Anaphylaxis, (ADRENALIN) 1 MG/ML injection Inject Subcutaneous once (Patient not taking: Reported on 5/2/2022)     fluticasone (FLONASE) 50 MCG/ACT nasal spray Spray 1 spray into both nostrils daily     sildenafil (REVATIO) 20 MG tablet Take 20 mg by mouth 3-5 tablets once daily as needed 30-60 mins before needed for ED (Patient not taking: Reported on 5/2/2022)     No current facility-administered medications for this visit.              Review of Systems:   A comprehensive 10 point review of systems (constitutional, ENT, cardiac, peripheral vascular, lymphatic, respiratory, GI, , Musculoskeletal, skin, Neurological) was performed and found to be  negative except as described in this note.     See intake form completed by patient    Answers for HPI/ROS submitted by the patient on 4/30/2022  General Symptoms: No  Skin Symptoms: No  HENT Symptoms: No  EYE SYMPTOMS: No  HEART SYMPTOMS: No  LUNG SYMPTOMS: No  INTESTINAL SYMPTOMS: No  URINARY SYMPTOMS: No  REPRODUCTIVE SYMPTOMS: No  SKELETAL SYMPTOMS: Yes  BLOOD SYMPTOMS: No  NERVOUS SYSTEM SYMPTOMS: No  MENTAL HEALTH SYMPTOMS: No  Back pain: No  Muscle aches: No  Neck pain: No  Swollen joints: No  Joint pain: No  Bone pain: Yes  Muscle cramps: No  Muscle weakness: No  Joint stiffness: No  Bone fracture: Yes

## 2022-05-02 NOTE — TELEPHONE ENCOUNTER
DIAGNOSIS: Closed nondisplaced fracture of left clavicle - Lytic lesion   APPOINTMENT DATE: 5/2/2022   NOTES STATUS DETAILS   OFFICE NOTE from referring provider Internal Alyssa Lopez MD   OFFICE NOTE from other specialist Internal Sports Med Appt in Epic   Office Visit- 3/21/2022 with Dr. Jessica MORA Oncology Records are in EPIC   MEDICATION LIST Internal [facility]   LABS     CT SCAN Internal CT Shoulder Left 3/23/2022   XRAYS (IMAGES & REPORTS) Internal Xray Clavicle LT 3/21/2022   TUMOR     PATHOLOGY  Slides & report

## 2022-05-21 ENCOUNTER — HEALTH MAINTENANCE LETTER (OUTPATIENT)
Age: 72
End: 2022-05-21

## 2022-05-25 ENCOUNTER — TRANSFERRED RECORDS (OUTPATIENT)
Dept: HEALTH INFORMATION MANAGEMENT | Facility: CLINIC | Age: 72
End: 2022-05-25
Payer: COMMERCIAL

## 2022-06-02 ENCOUNTER — TELEPHONE (OUTPATIENT)
Dept: FAMILY MEDICINE | Facility: CLINIC | Age: 72
End: 2022-06-02
Payer: COMMERCIAL

## 2022-06-02 NOTE — TELEPHONE ENCOUNTER
Pt calling stating he is on [facility] website and trying to send his records to Dr. Mckeon. Pt is requesting provider's email address to send the records.   RN advised that generally records that are needed should be added to pt chart so they are visible to the entire care team as needed.     : Please return call to pt to assist with request to get his [facility] to PCP to review.    AMRIT WatkinsN, RN

## 2022-06-03 NOTE — TELEPHONE ENCOUNTER
Left message on patient's voicemail, that the best way to get Dr Mckeon his records, is to have [facility] fax us the records.Dolly Cortes MA/HONG

## 2022-06-22 ENCOUNTER — TRANSFERRED RECORDS (OUTPATIENT)
Dept: FAMILY MEDICINE | Facility: CLINIC | Age: 72
End: 2022-06-22

## 2022-06-23 DIAGNOSIS — E11.9 TYPE 2 DIABETES MELLITUS WITHOUT COMPLICATION, WITHOUT LONG-TERM CURRENT USE OF INSULIN (H): ICD-10-CM

## 2022-06-24 RX ORDER — METFORMIN HCL 500 MG
TABLET, EXTENDED RELEASE 24 HR ORAL
Qty: 180 TABLET | Refills: 1 | Status: SHIPPED | OUTPATIENT
Start: 2022-06-24 | End: 2022-09-09

## 2022-06-24 NOTE — TELEPHONE ENCOUNTER
"Routing refill request to provider for review/approval because:  Failed protocol.  Thank you. Dagmar Lopez R.N.  Requested Prescriptions   Pending Prescriptions Disp Refills    metFORMIN (GLUCOPHAGE XR) 500 MG 24 hr tablet [Pharmacy Med Name: METFORMIN HCL ER TABS 500MG] 180 tablet 1     Sig: TAKE 1 TABLET DAILY WITH DINNER (NEED APPOINTMENT)        Biguanide Agents Failed - 6/23/2022 11:35 PM        Failed - Patient has documented A1c within the specified period of time.     If HgbA1C is 8 or greater, it needs to be on file within the past 3 months.  If less than 8, must be on file within the past 6 months.     Recent Labs   Lab Test 10/25/21  1123   A1C 6.0*             Failed - Recent (6 mo) or future (30 days) visit within the authorizing provider's specialty     Patient had office visit in the last 6 months or has a visit in the next 30 days with authorizing provider or within the authorizing provider's specialty.  See \"Patient Info\" tab in inbasket, or \"Choose Columns\" in Meds & Orders section of the refill encounter.            Passed - Patient is age 10 or older        Passed - Patient's CR is NOT>1.4 OR Patient's EGFR is NOT<45 within past 12 mos.       Recent Labs   Lab Test 10/25/21  1123 10/25/19  1155   GFRESTIMATED 49* 56*   GFRESTBLACK  --  65       Recent Labs   Lab Test 10/25/21  1123   CR 1.45*             Passed - Patient does NOT have a diagnosis of CHF.        Passed - Medication is active on med list              "

## 2022-06-29 ENCOUNTER — OFFICE VISIT (OUTPATIENT)
Dept: FAMILY MEDICINE | Facility: CLINIC | Age: 72
End: 2022-06-29
Payer: COMMERCIAL

## 2022-06-29 VITALS
DIASTOLIC BLOOD PRESSURE: 84 MMHG | WEIGHT: 180 LBS | RESPIRATION RATE: 18 BRPM | HEART RATE: 50 BPM | OXYGEN SATURATION: 100 % | BODY MASS INDEX: 29.05 KG/M2 | SYSTOLIC BLOOD PRESSURE: 124 MMHG | TEMPERATURE: 97.9 F

## 2022-06-29 DIAGNOSIS — I10 HYPERTENSION GOAL BP (BLOOD PRESSURE) < 140/90: ICD-10-CM

## 2022-06-29 PROCEDURE — 99213 OFFICE O/P EST LOW 20 MIN: CPT | Performed by: FAMILY MEDICINE

## 2022-06-29 RX ORDER — LOSARTAN POTASSIUM 100 MG/1
100 TABLET ORAL DAILY
Qty: 30 TABLET | Refills: 2 | Status: SHIPPED | OUTPATIENT
Start: 2022-06-29 | End: 2022-09-09

## 2022-06-29 ASSESSMENT — PAIN SCALES - GENERAL: PAINLEVEL: NO PAIN (0)

## 2022-06-29 NOTE — NURSING NOTE
"Chief Complaint   Patient presents with     Hypertension       Initial BP (!) 145/78   Pulse 50   Temp 97.9  F (36.6  C) (Tympanic)   Resp 18   Wt 81.6 kg (180 lb)   SpO2 100%   BMI 29.05 kg/m   Estimated body mass index is 29.05 kg/m  as calculated from the following:    Height as of 5/2/22: 1.676 m (5' 6\").    Weight as of this encounter: 81.6 kg (180 lb).  Medication Reconciliation: complete  Carmela Thacker CMA  "

## 2022-06-29 NOTE — PROGRESS NOTES
Subjective   JUDE is a 71 year old, presenting for the following health issues:  Hypertension      History of Present Illness       Hypertension: He presents for follow up of hypertension.  He does check blood pressure  regularly outside of the clinic. Outside blood pressures have been over 140/90. He follows a low salt diet.               Review of Systems         Objective    /84   Pulse 50   Temp 97.9  F (36.6  C) (Tympanic)   Resp 18   Wt 81.6 kg (180 lb)   SpO2 100%   BMI 29.05 kg/m    Body mass index is 29.05 kg/m .  Physical Exam                       .  ..   --------------------------------------------------------------------------------------------------------------------------------------    SUBJECTIVE:  Jude Denis is an 71 year old male who presents for a follow up evaluation of his hypertension.The patient was kept on the same medications at the last visit which was 50 mg of losartan once a day . The patient reports that he IS NOT taking the medication as prescribed. He denies side effects of medication.    He is being treated for lymphoma.  On June 22nd he had his first dose of chemo cocktail and it included a steroid   His blood pressure was 126/78 that day  The following day he got the second dose of the chemo with a steroid and his blood pressure was 145 89   For the last 4 day(s) his blood pressure has been  130-154 79-93    He started taking the losartan twice a day(s) since Sunday 3 day(s) ago.            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  Yes      Use of agents associated with hypertension: none  Current Outpatient Medications   Medication     APREPITANT IV     Bendamustine HCl (BENDEKA IV)     cetirizine (ZYRTEC) 10 MG CHEW     Coenzyme Q10 (COQ10 PO)     DEXAMETHASONE IO     fluticasone (FLONASE) 50 MCG/ACT nasal spray     losartan (COZAAR) 50 MG tablet     metFORMIN (GLUCOPHAGE XR) 500 MG 24 hr tablet     PALONOSETRON HCL IV     pravastatin (PRAVACHOL) 40 MG tablet     raxibacumab 50 mg/mL     EPINEPHrine, Anaphylaxis, (ADRENALIN) 1 MG/ML injection     sildenafil (REVATIO) 20 MG tablet     VENTOLIN  (90 Base) MCG/ACT inhaler     No current facility-administered medications for this visit.         Allergies   Allergen Reactions     Asa [Aspirin] Other (See Comments)     Salivating and difficulty swallowing     Atorvastatin      Leg cramps     Bees        Social History     Tobacco Use     Smoking status: Never Smoker     Smokeless tobacco: Never Used   Substance Use Topics     Alcohol use: Yes       OBJECTIVE:  /84   Pulse 50   Temp 97.9  F (36.6  C) (Tympanic)   Resp 18   Wt 81.6 kg (180 lb)   SpO2 100%   BMI 29.05 kg/m       I used his [employer] brand home BP cuff right after using ours and got 149/92            No results found for any visits on 06/29/22.    The 10-year ASCVD risk score (Flora JC Jr., et al., 2013) is: 34.9%    Values used to calculate the score:      Age: 71 years      Sex: Male      Is Non- : No      Diabetic: Yes      Tobacco smoker: No      Systolic Blood Pressure: 124 mmHg      Is BP treated: Yes      HDL Cholesterol: 41 mg/dL      Total Cholesterol: 169 mg/dL    ASSESSMENT:  Essential hypertension which is very well controlled.       Plan:  - Medication:continue the current doses of medication of losartan 100 daily . He has Chemo until the end of July and we will see him in Mid august for a blood pressure recheck.         The patient was advised to do the following therapuetic life  style changes  - Dietary sodium restriction and increase potassium and Calcium intake  - Regular aerobic exercise  - Weight loss  - Discontinue smoking if applicable  - Avoid regular NSAID use if applicable  - Avoid regular decongestant use if applicable    - Check a basic metabolic panel today    Patient Education: Reviewed risks of hypertension and principles of   Treatment.

## 2022-07-20 ENCOUNTER — TRANSFERRED RECORDS (OUTPATIENT)
Dept: FAMILY MEDICINE | Facility: CLINIC | Age: 72
End: 2022-07-20

## 2022-07-20 LAB — PHQ9 SCORE: 0

## 2022-08-14 ENCOUNTER — MYC REFILL (OUTPATIENT)
Dept: FAMILY MEDICINE | Facility: CLINIC | Age: 72
End: 2022-08-14

## 2022-08-14 DIAGNOSIS — R06.2 WHEEZING: ICD-10-CM

## 2022-08-17 ENCOUNTER — TRANSFERRED RECORDS (OUTPATIENT)
Dept: HEALTH INFORMATION MANAGEMENT | Facility: CLINIC | Age: 72
End: 2022-08-17

## 2022-08-19 NOTE — TELEPHONE ENCOUNTER
Patient is scheduled.  Future Appointments   Date Time Provider Department Center   9/9/2022  8:30 AM Eden Ferguson MD [facility]     [employer],     [facility]

## 2022-08-24 RX ORDER — ALBUTEROL SULFATE 90 UG/1
AEROSOL, METERED RESPIRATORY (INHALATION)
Qty: 18 G | Refills: 0 | Status: SHIPPED | OUTPATIENT
Start: 2022-08-24 | End: 2022-09-09

## 2022-09-06 ASSESSMENT — ENCOUNTER SYMPTOMS
ARTHRALGIAS: 1
SHORTNESS OF BREATH: 0
MYALGIAS: 0
EYE PAIN: 0
PARESTHESIAS: 0
NERVOUS/ANXIOUS: 0
NAUSEA: 1
JOINT SWELLING: 0
DIARRHEA: 1
HEMATOCHEZIA: 0
SORE THROAT: 0
CONSTIPATION: 0
DYSURIA: 0
ABDOMINAL PAIN: 1
WEAKNESS: 1
HEARTBURN: 0
PALPITATIONS: 0
CHILLS: 0
FEVER: 0
DIZZINESS: 0
HEMATURIA: 0
HEADACHES: 0
COUGH: 1
FREQUENCY: 0

## 2022-09-06 ASSESSMENT — ACTIVITIES OF DAILY LIVING (ADL): CURRENT_FUNCTION: NO ASSISTANCE NEEDED

## 2022-09-08 ASSESSMENT — ACTIVITIES OF DAILY LIVING (ADL): CURRENT_FUNCTION: NO ASSISTANCE NEEDED

## 2022-09-08 ASSESSMENT — ENCOUNTER SYMPTOMS
HEARTBURN: 0
DYSURIA: 0
HEADACHES: 0
HEMATURIA: 0
EYE PAIN: 0
SORE THROAT: 0
FREQUENCY: 0
FEVER: 0
SHORTNESS OF BREATH: 0
ARTHRALGIAS: 1
CHILLS: 0
COUGH: 1
PALPITATIONS: 0
HEMATOCHEZIA: 0
WEAKNESS: 1
NAUSEA: 1
MYALGIAS: 0
PARESTHESIAS: 0
CONSTIPATION: 0
ABDOMINAL PAIN: 1
DIARRHEA: 1
JOINT SWELLING: 0
NERVOUS/ANXIOUS: 0
DIZZINESS: 0

## 2022-09-08 NOTE — PATIENT INSTRUCTIONS
Patient Education   Personalized Prevention Plan  You are due for the preventive services outlined below.  Your care team is available to assist you in scheduling these services.  If you have already completed any of these items, please share that information with your care team to update in your medical record.  Health Maintenance Due   Topic Date Due     ANNUAL REVIEW OF HM ORDERS  Never done     Urine Test  Never done     AORTIC ANEURYSM SCREENING (SYSTEM ASSIGNED)  Never done     A1C Lab  04/25/2022     COVID-19 Vaccine (5 - Booster for Pfizer series) 08/08/2022     Flu Vaccine (1) 09/01/2022     Hemoglobin  10/25/2022

## 2022-09-08 NOTE — PROGRESS NOTES
SUBJECTIVE:   César Denis is a 71 year old male who presents for Preventive Visit.      Patient has been advised of split billing requirements and indicates understanding: Yes  Are you in the first 12 months of your Medicare coverage?  No      Preventive -     Immunization History   Administered Date(s) Administered     COVID-19,PF,Pfizer (12+ Yrs) 03/09/2021, 03/29/2021, 08/14/2021     COVID-19,PF,Pfizer 12+ Yrs (2022 and After) 04/08/2022     HepB-Adult 11/05/2012, 04/08/2019, 10/25/2019     Influenza (H1N1) 12/16/2009     Influenza (High Dose) 3 valent vaccine 10/24/2018, 10/25/2019, 09/10/2020, 09/08/2021     Influenza (IIV3) PF 09/01/2009, 10/05/2010, 10/03/2011, 11/05/2012     Influenza Vaccine IM > 6 months Valent IIV4 (Alfuria,Fluzone) 09/09/2013, 10/27/2015     Influenza Vaccine, 6+MO IM (QUADRIVALENT W/PRESERVATIVES) 10/13/2017     Influenza, Quad, High Dose, Pf, 65yr+ (Fluzone HD) 09/09/2022     Pneumo Conj 13-V (2010&after) 10/25/2017     Pneumococcal 23 valent 09/01/2009, 12/03/2018     TD (ADULT, 7+) 04/08/2019     TDAP Vaccine (Adacel) 07/18/2008     Td (Adult), Adsorbed 06/18/2004     Zoster vaccine recombinant adjuvanted (SHINGRIX) 07/27/2020, 09/19/2020     Zoster vaccine, live 09/10/2012       - Colon CA screen: Colonoscopy, age 45-75 every 10 years or FIT every year or Cologuard every 3 years   Had colonoscopy 10/2018   Next 10/2023     - Prostate CA screen: Discussed controversy about screening.   PSA   Date Value Ref Range Status   08/03/2020 2.07 0 - 4 ug/L Final     Comment:     Assay Method:  Chemiluminescence using Siemens Vista analyzer     Prostate Specific Antigen Screen   Date Value Ref Range Status   10/25/2021 3.24 0.00 - 4.00 ug/L Final         Healthy Habits:     In general, how would you rate your overall health?  Fair    Frequency of exercise:  4-5 days/week    Duration of exercise:  15-30 minutes    Do you usually eat at least 4 servings of fruit and vegetables a day, include  "whole grains    & fiber and avoid regularly eating high fat or \"junk\" foods?  No    Taking medications regularly:  Yes    Medication side effects:  None    Ability to successfully perform activities of daily living:  No assistance needed    Home Safety:  No safety concerns identified    Hearing Impairment:  Difficulty understanding soft or whispered speech    In the past 6 months, have you been bothered by leaking of urine?  No    In general, how would you rate your overall mental or emotional health?  Excellent      PHQ-2 Total Score: 1    Additional concerns today:  No    Do you feel safe in your environment? Yes    Have you ever done Advance Care Planning? (For example, a Health Directive, POLST, or a discussion with a medical provider or your loved ones about your wishes): No, advance care planning information given to patient to review.  Advanced care planning was discussed at today's visit.       Fall risk  Fallen 2 or more times in the past year?: No  Any fall with injury in the past year?: No    Cognitive Screening   1) Repeat 3 items (Leader, Season, Table)    2) Clock draw: NORMAL  3) 3 item recall: Recalls 2 objects   Results: NORMAL clock, 1-2 items recalled: COGNITIVE IMPAIRMENT LESS LIKELY    Mini-CogTM Copyright S Eusebio. Licensed by the author for use in [facility]; reprinted with permission (jesus@.[email or web address]). All rights reserved.      Do you have sleep apnea, excessive snoring or daytime drowsiness?: no    Reviewed and updated as needed this visit by clinical staff   Tobacco  Allergies  Meds   Med Hx  Surg Hx  Fam Hx  Soc Hx          Reviewed and updated as needed this visit by Provider                   Social History     Tobacco Use     Smoking status: Never Smoker     Smokeless tobacco: Never Used   Substance Use Topics     Alcohol use: Yes     If you drink alcohol do you typically have >3 drinks per day or >7 drinks per week? No    Alcohol Use 9/6/2022   Prescreen: >3 drinks/day " "or >7 drinks/week? No   Prescreen: >3 drinks/day or >7 drinks/week? -   No flowsheet data found.          Current providers sharing in care for this patient include:   Patient Care Team:  Eden Ferguson MD as PCP - General (Family Medicine)  Ronnie Mckeon MD as Assigned PCP  Nelia Blackburn MD as MD (Hematology & Oncology)  Bimal Eller MD as Assigned Musculoskeletal Provider    The following health maintenance items are reviewed in Epic and correct as of today:  Health Maintenance Due   Topic Date Due     ANNUAL REVIEW OF  ORDERS  Never done     URINALYSIS  Never done     AORTIC ANEURYSM SCREENING (SYSTEM ASSIGNED)  Never done     A1C  04/25/2022     COVID-19 Vaccine (5 - Booster for Pfizer series) 08/08/2022     INFLUENZA VACCINE (1) 09/01/2022     HEMOGLOBIN  10/25/2022               Review of Systems   Constitutional: Negative for chills and fever.   HENT: Negative for congestion, ear pain, hearing loss and sore throat.    Eyes: Negative for pain and visual disturbance.   Respiratory: Positive for cough. Negative for shortness of breath.    Cardiovascular: Negative for chest pain, palpitations and peripheral edema.   Gastrointestinal: Positive for abdominal pain, diarrhea and nausea. Negative for constipation, heartburn and hematochezia.   Genitourinary: Positive for impotence. Negative for dysuria, frequency, genital sores, hematuria, penile discharge and urgency.   Musculoskeletal: Positive for arthralgias. Negative for joint swelling and myalgias.   Skin: Negative for rash.   Neurological: Positive for weakness. Negative for dizziness, headaches and paresthesias.   Psychiatric/Behavioral: Negative for mood changes. The patient is not nervous/anxious.          OBJECTIVE:   /80 (BP Location: Right arm, Patient Position: Sitting, Cuff Size: Adult Regular)   Pulse 83   Temp 98.2  F (36.8  C) (Tympanic)   Ht 1.676 m (5' 6\")   Wt 83.9 kg (185 lb)   SpO2 97%   BMI 29.86 kg/m   Estimated body mass " "index is 29.86 kg/m  as calculated from the following:    Height as of this encounter: 1.676 m (5' 6\").    Weight as of this encounter: 83.9 kg (185 lb).  Physical Exam  GENERAL: healthy, alert and no distress  EYES: Eyes grossly normal to inspection, PERRL and conjunctivae and sclerae normal  NECK: mass left supraclavicular area   RESP: lungs clear to auscultation - no rales, rhonchi or wheezes  CV: regular rate and rhythm, normal S1 S2, no S3 or S4, no murmur, click or rub, no peripheral edema and peripheral pulses strong  ABDOMEN: soft, nontender, no hepatosplenomegaly, no masses and bowel sounds normal  MS: no gross musculoskeletal defects noted, no edema  SKIN: no suspicious lesions or rashes  NEURO: Normal strength and tone, mentation intact and speech normal  PSYCH: mentation appears normal, affect normal/bright        ASSESSMENT / PLAN:   (Z00.00) Encounter for Medicare annual wellness exam  (primary encounter diagnosis)  Comment: Preventive care reviewed and updated.    Plan: Hemoglobin        Repeat yearly     (E11.22) Type 2 diabetes mellitus with chronic kidney disease, without long-term current use of insulin, unspecified CKD stage (H)  Comment: well controlled  Continue Metformin daily   Lab Results   Component Value Date    A1C 6.3 09/09/2022    A1C 6.0 10/25/2021    A1C 6.3 08/03/2020    A1C 6.1 10/25/2019    A1C 6.2 04/12/2019    A1C 5.9 12/03/2018       Plan: HEMOGLOBIN A1C, metFORMIN (GLUCOPHAGE XR) 500         MG 24 hr tablet, Comprehensive metabolic panel         (BMP + Alb, Alk Phos, ALT, AST, Total. Bili,         TP)         (I10) Hypertension goal BP (blood pressure) < 140/90  Comment: BP at goal   Continue Losartan   Plan: losartan (COZAAR) 100 MG tablet            (R06.2) Wheezing  Comment:stable with Albuterol , no wheezing today   Plan: VENTOLIN  (90 Base) MCG/ACT inhaler      (E78.00) Elevated LDL cholesterol level  Comment: chronic stable problem   Plan: pravastatin (PRAVACHOL) " "40 MG tablet         (N18.30) Stage 3 chronic kidney disease, unspecified whether stage 3a or 3b CKD (H)  Comment:   likely related t  Diabetes and hypertension   Repeat kidney function   Avoid nephrotoxin   Creatinine   Date Value Ref Range Status   10/25/2021 1.45 (H) 0.66 - 1.25 mg/dL Final   10/25/2019 1.30 (H) 0.66 - 1.25 mg/dL Final       (Z12.83) Skin cancer screening    Plan: Adult Dermatology Referral      (Z12.5) Screening for prostate cancer    Plan: PSA, screen        Patient has been advised of split billing requirements and indicates understanding: Yes    COUNSELING:  Reviewed preventive health counseling, as reflected in patient instructions       Regular exercise       Healthy diet/nutrition       Immunizations    Vaccinated for: Influenza          Estimated body mass index is 29.86 kg/m  as calculated from the following:    Height as of this encounter: 1.676 m (5' 6\").    Weight as of this encounter: 83.9 kg (185 lb).    Weight management plan: Discussed healthy diet and exercise guidelines    He reports that he has never smoked. He has never used smokeless tobacco.      Appropriate preventive services were discussed with this patient, including applicable screening as appropriate for cardiovascular disease, diabetes, osteopenia/osteoporosis, and glaucoma.  As appropriate for age/gender, discussed screening for colorectal cancer, prostate cancer, breast cancer, and cervical cancer. Checklist reviewing preventive services available has been given to the patient.    Reviewed patients plan of care and provided an AVS. The Basic Care Plan (routine screening as documented in Health Maintenance) for César meets the Care Plan requirement. This Care Plan has been established and reviewed with the Patient.    Counseling Resources:  ATP IV Guidelines  Pooled Cohorts Equation Calculator  Breast Cancer Risk Calculator  Breast Cancer: Medication to Reduce Risk  FRAX Risk Assessment  ICSI Preventive " Guidelines  Dietary Guidelines for Americans, 2010  USDA's MyPlate  ASA Prophylaxis  Lung CA Screening    Eden Ferguson MD  [facility]    Identified Health Risks:

## 2022-09-09 ENCOUNTER — OFFICE VISIT (OUTPATIENT)
Dept: FAMILY MEDICINE | Facility: CLINIC | Age: 72
End: 2022-09-09
Payer: COMMERCIAL

## 2022-09-09 VITALS
SYSTOLIC BLOOD PRESSURE: 132 MMHG | BODY MASS INDEX: 29.73 KG/M2 | HEART RATE: 83 BPM | WEIGHT: 185 LBS | TEMPERATURE: 98.2 F | OXYGEN SATURATION: 97 % | DIASTOLIC BLOOD PRESSURE: 80 MMHG | HEIGHT: 66 IN

## 2022-09-09 DIAGNOSIS — E78.00 ELEVATED LDL CHOLESTEROL LEVEL: ICD-10-CM

## 2022-09-09 DIAGNOSIS — Z12.83 SKIN CANCER SCREENING: ICD-10-CM

## 2022-09-09 DIAGNOSIS — Z00.00 ENCOUNTER FOR MEDICARE ANNUAL WELLNESS EXAM: Primary | ICD-10-CM

## 2022-09-09 DIAGNOSIS — N18.30 STAGE 3 CHRONIC KIDNEY DISEASE, UNSPECIFIED WHETHER STAGE 3A OR 3B CKD (H): ICD-10-CM

## 2022-09-09 DIAGNOSIS — Z12.5 SCREENING FOR PROSTATE CANCER: ICD-10-CM

## 2022-09-09 DIAGNOSIS — E11.22 TYPE 2 DIABETES MELLITUS WITH CHRONIC KIDNEY DISEASE, WITHOUT LONG-TERM CURRENT USE OF INSULIN, UNSPECIFIED CKD STAGE (H): ICD-10-CM

## 2022-09-09 DIAGNOSIS — I10 HYPERTENSION GOAL BP (BLOOD PRESSURE) < 140/90: ICD-10-CM

## 2022-09-09 DIAGNOSIS — R06.2 WHEEZING: ICD-10-CM

## 2022-09-09 LAB
ALBUMIN SERPL-MCNC: 3.7 G/DL (ref 3.4–5)
ALP SERPL-CCNC: 90 U/L (ref 40–150)
ALT SERPL W P-5'-P-CCNC: 34 U/L (ref 0–70)
ANION GAP SERPL CALCULATED.3IONS-SCNC: 6 MMOL/L (ref 3–14)
AST SERPL W P-5'-P-CCNC: 16 U/L (ref 0–45)
BILIRUB SERPL-MCNC: 0.4 MG/DL (ref 0.2–1.3)
BUN SERPL-MCNC: 21 MG/DL (ref 7–30)
CALCIUM SERPL-MCNC: 9.4 MG/DL (ref 8.5–10.1)
CHLORIDE BLD-SCNC: 106 MMOL/L (ref 94–109)
CO2 SERPL-SCNC: 27 MMOL/L (ref 20–32)
CREAT SERPL-MCNC: 1.3 MG/DL (ref 0.66–1.25)
GFR SERPL CREATININE-BSD FRML MDRD: 59 ML/MIN/1.73M2
GLUCOSE BLD-MCNC: 116 MG/DL (ref 70–99)
HBA1C MFR BLD: 6.3 % (ref 0–5.6)
HGB BLD-MCNC: 14.5 G/DL (ref 13.3–17.7)
POTASSIUM BLD-SCNC: 4.7 MMOL/L (ref 3.4–5.3)
PROT SERPL-MCNC: 7.3 G/DL (ref 6.8–8.8)
PSA SERPL-MCNC: 3.19 UG/L (ref 0–4)
SODIUM SERPL-SCNC: 139 MMOL/L (ref 133–144)

## 2022-09-09 PROCEDURE — 85018 HEMOGLOBIN: CPT | Performed by: FAMILY MEDICINE

## 2022-09-09 PROCEDURE — G0008 ADMIN INFLUENZA VIRUS VAC: HCPCS | Performed by: FAMILY MEDICINE

## 2022-09-09 PROCEDURE — 90662 IIV NO PRSV INCREASED AG IM: CPT | Performed by: FAMILY MEDICINE

## 2022-09-09 PROCEDURE — 83036 HEMOGLOBIN GLYCOSYLATED A1C: CPT | Performed by: FAMILY MEDICINE

## 2022-09-09 PROCEDURE — 99214 OFFICE O/P EST MOD 30 MIN: CPT | Mod: 25 | Performed by: FAMILY MEDICINE

## 2022-09-09 PROCEDURE — 80053 COMPREHEN METABOLIC PANEL: CPT | Performed by: FAMILY MEDICINE

## 2022-09-09 PROCEDURE — 36415 COLL VENOUS BLD VENIPUNCTURE: CPT | Performed by: FAMILY MEDICINE

## 2022-09-09 PROCEDURE — G0103 PSA SCREENING: HCPCS | Performed by: FAMILY MEDICINE

## 2022-09-09 PROCEDURE — 99397 PER PM REEVAL EST PAT 65+ YR: CPT | Mod: 25 | Performed by: FAMILY MEDICINE

## 2022-09-09 RX ORDER — LOSARTAN POTASSIUM 100 MG/1
100 TABLET ORAL DAILY
Qty: 30 TABLET | Refills: 2 | Status: SHIPPED | OUTPATIENT
Start: 2022-09-09 | End: 2022-10-19

## 2022-09-09 RX ORDER — PRAVASTATIN SODIUM 40 MG
40 TABLET ORAL AT BEDTIME
Qty: 90 TABLET | Refills: 3 | Status: SHIPPED | OUTPATIENT
Start: 2022-09-09 | End: 2023-09-05

## 2022-09-09 RX ORDER — METFORMIN HCL 500 MG
500 TABLET, EXTENDED RELEASE 24 HR ORAL
Qty: 180 TABLET | Refills: 1 | Status: SHIPPED | OUTPATIENT
Start: 2022-09-09 | End: 2023-10-06

## 2022-09-09 RX ORDER — ALBUTEROL SULFATE 90 UG/1
AEROSOL, METERED RESPIRATORY (INHALATION)
Qty: 18 G | Refills: 0 | Status: SHIPPED | OUTPATIENT
Start: 2022-09-09

## 2022-09-09 ASSESSMENT — PAIN SCALES - GENERAL: PAINLEVEL: NO PAIN (0)

## 2022-09-28 ENCOUNTER — TRANSFERRED RECORDS (OUTPATIENT)
Dept: HEALTH INFORMATION MANAGEMENT | Facility: CLINIC | Age: 72
End: 2022-09-28

## 2022-10-26 ENCOUNTER — TRANSFERRED RECORDS (OUTPATIENT)
Dept: HEALTH INFORMATION MANAGEMENT | Facility: CLINIC | Age: 72
End: 2022-10-26

## 2022-12-21 ENCOUNTER — TRANSFERRED RECORDS (OUTPATIENT)
Dept: HEALTH INFORMATION MANAGEMENT | Facility: CLINIC | Age: 72
End: 2022-12-21

## 2022-12-23 ENCOUNTER — TRANSFERRED RECORDS (OUTPATIENT)
Dept: HEALTH INFORMATION MANAGEMENT | Facility: CLINIC | Age: 72
End: 2022-12-23

## 2023-01-24 ENCOUNTER — TRANSFERRED RECORDS (OUTPATIENT)
Dept: FAMILY MEDICINE | Facility: CLINIC | Age: 73
End: 2023-01-24

## 2023-01-30 ENCOUNTER — TRANSFERRED RECORDS (OUTPATIENT)
Dept: HEALTH INFORMATION MANAGEMENT | Facility: CLINIC | Age: 73
End: 2023-01-30
Payer: COMMERCIAL

## 2023-02-07 ENCOUNTER — TRANSFERRED RECORDS (OUTPATIENT)
Dept: HEALTH INFORMATION MANAGEMENT | Facility: CLINIC | Age: 73
End: 2023-02-07
Payer: COMMERCIAL

## 2023-02-08 ENCOUNTER — TELEPHONE (OUTPATIENT)
Dept: FAMILY MEDICINE | Facility: CLINIC | Age: 73
End: 2023-02-08
Payer: COMMERCIAL

## 2023-02-08 NOTE — TELEPHONE ENCOUNTER
Reason for Call:  Appointment Request    Patient requesting this type of appt: Pre-op    Requested provider: Dr Ferguson     Reason patient unable to be scheduled: Not within requested timeframe    When does patient want to be seen/preferred time: Patient needs a pre op before 2/16 for a chemo port placement on 2/16 at [facility]     Comments: Can Dr. Ferguson fit his patient in for a pre op? Thank you     Could we send this information to you in [facility] or would you prefer to receive a phone call?:  [phone]  Call taken on 2/8/2023 at 3:00 PM by Kathia Meng

## 2023-02-09 NOTE — PROGRESS NOTES
[facility]  [address] [address]  Phone: [phone]  Primary Provider: Ray Ferguson  Pre-op Performing Provider: RAY FERGUSON      PREOPERATIVE EVALUATION:  Today's date: 2/10/2023    César Denis is a 72 year old male who presents for a preoperative evaluation.    Surgical Information:  Surgery/Procedure: Chemo Port  Surgery Location: [facility]  Surgeon: TBD  Surgery Date: 2/16/23  Time of Surgery: TBD  Where patient plans to recover: At home with family  Fax number for surgical facility: Note does not need to be faxed, will be available electronically in Epic.    Type of Anesthesia Anticipated: to be determined    Assessment & Plan     The proposed surgical procedure is considered LOW risk.    Preop general physical exam  There is no contraindication for the procedure   Follicular lymphoma grade II, unspecified body region (H)  Follows with [facility]   Scheduled for port placement to start chemotherapy    Hypertension goal BP (blood pressure) < 140/90  BP is well controled with Losartan   continue the same treatment   - losartan (COZAAR) 100 MG tablet; Take 1 tablet (100 mg) by mouth daily    Type 2 diabetes mellitus with chronic kidney disease, without long-term current use of insulin, unspecified CKD stage (H)  Currently taking Metformin  declined labs today    continue Metformin   Stage 3 chronic kidney disease, unspecified whether stage 3a or 3b CKD (H)  Stable   Last creatinine 1.3   continue to monitor     Risks and Recommendations:  The patient has the following additional risks and recommendations for perioperative complications:  Diabetes:  - Patient is not on insulin therapy: regular NPO guidelines can be followed.     Medication Instructions:   - ACE/ARB: May be continued on the day of surgery.    - Statins: Continue taking on the day of surgery.    - metformin: HOLD day of surgery.    RECOMMENDATION:  APPROVAL GIVEN to proceed with proposed  procedure, without further diagnostic evaluation.            Subjective     HPI related to upcoming procedure:   César Denis is a 72 year old male who presents today for preop exam for upcoming procedure. He is scheduled for port placement to start chemotherapy   He is doing well,and very active   Patient denies any chest pain, dyspnea at rest or with exertion, PND, orthopnea, peripheral edema, palpitations, lightheadedness or syncope.    Preop Questions 2/8/2023   1. Have you ever had a heart attack or stroke? No   2. Have you ever had surgery on your heart or blood vessels, such as a stent placement, a coronary artery bypass, or surgery on an artery in your head, neck, heart, or legs? No   3. Do you have chest pain with activity? No   4. Do you have a history of  heart failure? No   5. Do you currently have a cold, bronchitis or symptoms of other infection? No   6. Do you have a cough, shortness of breath, or wheezing? No   7. Do you or anyone in your family have previous history of blood clots? No   8. Do you or does anyone in your family have a serious bleeding problem such as prolonged bleeding following surgeries or cuts? No   9. Have you ever had problems with anemia or been told to take iron pills? No   10. Have you had any abnormal blood loss such as black, tarry or bloody stools? No   11. Have you ever had a blood transfusion? No   12. Are you willing to have a blood transfusion if it is medically needed before, during, or after your surgery? Yes   13. Have you or any of your relatives ever had problems with anesthesia? No   14. Do you have sleep apnea, excessive snoring or daytime drowsiness? No   15. Do you have any artifical heart valves or other implanted medical devices like a pacemaker, defibrillator, or continuous glucose monitor? No   16. Do you have artificial joints? No   17. Are you allergic to latex? YES:        Health Care Directive:  Patient does not have a Health Care Directive or Living  Will: Patient states has Advance Directive and will bring in a copy to clinic.    Preoperative Review of :   reviewed - controlled substances prescribed by other outside provider(s).      Status of Chronic Conditions:  DIABETES - Patient has a longstanding history of DiabetesType Type II . Patient is being treated with oral agents and denies significant side effects. Control has been good. Complicating factors include but are not limited to: hypertension and hyperlipidemia.     HYPERLIPIDEMIA - Patient has a long history of significant Hyperlipidemia requiring medication for treatment with recent good control. Patient reports no problems or side effects with the medication.     HYPERTENSION - Patient has longstanding history of HTN , currently denies any symptoms referable to elevated blood pressure. Specifically denies chest pain, palpitations, dyspnea, orthopnea, PND or peripheral edema. Blood pressure readings have been in normal range. Current medication regimen is as listed below. Patient denies any side effects of medication.       Review of Systems  CONSTITUTIONAL: NEGATIVE for fever, chills, change in weight  INTEGUMENTARY/SKIN: NEGATIVE for worrisome rashes, moles or lesions  EYES: NEGATIVE for vision changes or irritation  ENT/MOUTH: NEGATIVE for ear, mouth and throat problems  RESP: NEGATIVE for significant cough or SOB  CV: NEGATIVE for chest pain, palpitations or peripheral edema  GI: NEGATIVE for nausea, abdominal pain, heartburn, or change in bowel habits  : NEGATIVE for frequency, dysuria, or hematuria  MUSCULOSKELETAL: NEGATIVE for significant arthralgias or myalgia  NEURO: NEGATIVE for weakness, dizziness or paresthesias  ENDOCRINE: NEGATIVE for temperature intolerance, skin/hair changes  HEME: NEGATIVE for bleeding problems  PSYCHIATRIC: NEGATIVE for changes in mood or affect    Patient Active Problem List    Diagnosis Date Noted     Non-follicular lymphoma of lymph nodes of multiple  regions, unspecified non-follicular lymphoma type (H) 12/03/2018     Priority: Medium     Other male erectile dysfunction 12/03/2018     Priority: Medium     Hypertension goal BP (blood pressure) < 140/90 10/16/2018     Priority: Medium     Non-Hodgkin's lymphoma (H) 10/16/2018     Priority: Medium     CKD (chronic kidney disease) stage 3, GFR 30-59 ml/min (H) 10/16/2018     Priority: Medium     Type 2 diabetes mellitus with chronic kidney disease, without long-term current use of insulin, unspecified CKD stage (H)      Priority: Medium      Past Medical History:   Diagnosis Date     Cancer (H) non hodgkins lymphoma    since 2012     Diabetes (H)      Hypertension      Past Surgical History:   Procedure Laterality Date     BIOPSY  January 2023     COLONOSCOPY  2018     Current Outpatient Medications   Medication Sig Dispense Refill     APREPITANT IV        Bendamustine HCl (BENDEKA IV)        cetirizine (ZYRTEC) 10 MG CHEW Take 10 mg by mouth daily       Coenzyme Q10 (COQ10 PO) Take 300 mg by mouth       DEXAMETHASONE IO        EPINEPHrine, Anaphylaxis, (ADRENALIN) 1 MG/ML injection Inject Subcutaneous once       fluticasone (FLONASE) 50 MCG/ACT nasal spray Spray 1 spray into both nostrils daily       losartan (COZAAR) 100 MG tablet Take 1 tablet (100 mg) by mouth daily 30 tablet 1     metFORMIN (GLUCOPHAGE XR) 500 MG 24 hr tablet Take 1 tablet (500 mg) by mouth daily (with dinner) 180 tablet 1     PALONOSETRON HCL IV        pravastatin (PRAVACHOL) 40 MG tablet Take 1 tablet (40 mg) by mouth At Bedtime 90 tablet 3     raxibacumab 50 mg/mL Inject into the vein once       sildenafil (REVATIO) 20 MG tablet Take 20 mg by mouth 3-5 tablets once daily as needed 30-60 mins before needed for ED       VENTOLIN  (90 Base) MCG/ACT inhaler INHALE 2 PUFFS BY MOUTH 4 TIMES DAILY 18 g 0       Allergies   Allergen Reactions     Asa [Aspirin] Other (See Comments)     Salivating and difficulty swallowing     Atorvastatin       "Leg cramps     Bees         Social History     Tobacco Use     Smoking status: Never     Smokeless tobacco: Never   Substance Use Topics     Alcohol use: Yes     Family History   Problem Relation Age of Onset     Cancer Mother      Other Cancer Mother         Pancreatic     Heart Disease Father      Coronary Artery Disease Father         conjestive HF     Other Cancer Father      Glaucoma No family hx of      Macular Degeneration No family hx of      History   Drug Use No         Objective     /84   Pulse 102   Temp 97.3  F (36.3  C) (Tympanic)   Resp 16   Ht 1.676 m (5' 6\")   Wt 86.2 kg (190 lb)   SpO2 97%   BMI 30.67 kg/m      Physical Exam    GENERAL APPEARANCE: healthy, alert and no distress     EYES: EOMI,  PERRL     HENT: ear canals and TM's normal and nose and mouth without ulcers or lesions     NECK: no adenopathy, no asymmetry, masses, or scars and thyroid normal to palpation     RESP: lungs clear to auscultation - no rales, rhonchi or wheezes     CV: regular rates and rhythm, normal S1 S2, no S3 or S4 and no murmur, click or rub     ABDOMEN:  soft, nontender, no HSM or masses and bowel sounds normal     MS: extremities normal- no gross deformities noted, no evidence of inflammation in joints, FROM in all extremities.     SKIN: no suspicious lesions or rashes     NEURO: Normal strength and tone, sensory exam grossly normal, mentation intact and speech normal     PSYCH: mentation appears normal. and affect normal/bright     LYMPHATICS: left supraclavicular lymphadenopathy     Recent Labs   Lab Test 09/09/22  0854 10/25/21  1123   HGB 14.5 15.9    136   POTASSIUM 4.7 4.2   CR 1.30* 1.45*   A1C 6.3* 6.0*        Diagnostics:  No labs were ordered during this visit.   No EKG required for low risk surgery (cataract, skin procedure, breast biopsy, etc).    Revised Cardiac Risk Index (RCRI):  The patient has the following serious cardiovascular risks for perioperative complications:   - No " serious cardiac risks = 0 points     RCRI Interpretation: 0 points: Class I (very low risk - 0.4% complication rate)         Signed Electronically by: Eden Ferguson MD  Copy of this evaluation report is provided to requesting physician.

## 2023-02-10 ENCOUNTER — OFFICE VISIT (OUTPATIENT)
Dept: FAMILY MEDICINE | Facility: CLINIC | Age: 73
End: 2023-02-10
Payer: COMMERCIAL

## 2023-02-10 VITALS
OXYGEN SATURATION: 97 % | SYSTOLIC BLOOD PRESSURE: 137 MMHG | DIASTOLIC BLOOD PRESSURE: 84 MMHG | WEIGHT: 190 LBS | BODY MASS INDEX: 30.53 KG/M2 | TEMPERATURE: 97.3 F | RESPIRATION RATE: 16 BRPM | HEART RATE: 102 BPM | HEIGHT: 66 IN

## 2023-02-10 DIAGNOSIS — C82.10 FOLLICULAR LYMPHOMA GRADE II, UNSPECIFIED BODY REGION (H): ICD-10-CM

## 2023-02-10 DIAGNOSIS — N18.30 STAGE 3 CHRONIC KIDNEY DISEASE, UNSPECIFIED WHETHER STAGE 3A OR 3B CKD (H): ICD-10-CM

## 2023-02-10 DIAGNOSIS — Z01.818 PREOP GENERAL PHYSICAL EXAM: Primary | ICD-10-CM

## 2023-02-10 DIAGNOSIS — E11.22 TYPE 2 DIABETES MELLITUS WITH CHRONIC KIDNEY DISEASE, WITHOUT LONG-TERM CURRENT USE OF INSULIN, UNSPECIFIED CKD STAGE (H): ICD-10-CM

## 2023-02-10 DIAGNOSIS — I10 HYPERTENSION GOAL BP (BLOOD PRESSURE) < 140/90: ICD-10-CM

## 2023-02-10 PROCEDURE — 99214 OFFICE O/P EST MOD 30 MIN: CPT | Performed by: FAMILY MEDICINE

## 2023-02-10 RX ORDER — LOSARTAN POTASSIUM 100 MG/1
100 TABLET ORAL DAILY
Qty: 90 TABLET | Refills: 1 | Status: SHIPPED | OUTPATIENT
Start: 2023-02-10 | End: 2023-07-31

## 2023-02-10 ASSESSMENT — PAIN SCALES - GENERAL: PAINLEVEL: NO PAIN (0)

## 2023-03-07 ENCOUNTER — TELEPHONE (OUTPATIENT)
Dept: TRANSPLANT | Facility: CLINIC | Age: 73
End: 2023-03-07

## 2023-03-07 ENCOUNTER — TRANSFERRED RECORDS (OUTPATIENT)
Dept: HEALTH INFORMATION MANAGEMENT | Facility: CLINIC | Age: 73
End: 2023-03-07
Payer: COMMERCIAL

## 2023-03-07 ENCOUNTER — PATIENT OUTREACH (OUTPATIENT)
Dept: ONCOLOGY | Facility: CLINIC | Age: 73
End: 2023-03-07
Payer: COMMERCIAL

## 2023-03-07 ENCOUNTER — TRANSCRIBE ORDERS (OUTPATIENT)
Dept: ONCOLOGY | Facility: CLINIC | Age: 73
End: 2023-03-07
Payer: COMMERCIAL

## 2023-03-07 ENCOUNTER — MEDICAL CORRESPONDENCE (OUTPATIENT)
Dept: HEALTH INFORMATION MANAGEMENT | Facility: CLINIC | Age: 73
End: 2023-03-07
Payer: COMMERCIAL

## 2023-03-07 DIAGNOSIS — C82.90 FOLLICULAR NON-HODGKIN'S LYMPHOMA (H): Primary | ICD-10-CM

## 2023-03-07 NOTE — PROGRESS NOTES
Referral reviewed and re-routed to BMT dept / receipt confirmed            Thank you for this update.    The patient previously had a trial with oral sucralfate tablets, which she was not able to tolerate due to size, even if she broke them in half.  Let's please continue to obtain her oral sucralfate suspension as initially prescribed.    No changes to her current management plan are indicated, and the patient is to continue to follow up as previously instructed.  Thank you again.

## 2023-03-09 ENCOUNTER — TELEPHONE (OUTPATIENT)
Dept: TRANSPLANT | Facility: CLINIC | Age: 73
End: 2023-03-09
Payer: COMMERCIAL

## 2023-03-09 DIAGNOSIS — C82.99: Primary | ICD-10-CM

## 2023-03-28 ENCOUNTER — TRANSFERRED RECORDS (OUTPATIENT)
Dept: HEALTH INFORMATION MANAGEMENT | Facility: CLINIC | Age: 73
End: 2023-03-28

## 2023-04-11 ENCOUNTER — OFFICE VISIT (OUTPATIENT)
Dept: FAMILY MEDICINE | Facility: CLINIC | Age: 73
End: 2023-04-11
Payer: COMMERCIAL

## 2023-04-11 ENCOUNTER — TELEPHONE (OUTPATIENT)
Dept: FAMILY MEDICINE | Facility: CLINIC | Age: 73
End: 2023-04-11

## 2023-04-11 VITALS
BODY MASS INDEX: 29.25 KG/M2 | OXYGEN SATURATION: 98 % | WEIGHT: 182 LBS | RESPIRATION RATE: 18 BRPM | SYSTOLIC BLOOD PRESSURE: 160 MMHG | HEIGHT: 66 IN | HEART RATE: 100 BPM | TEMPERATURE: 98.2 F | DIASTOLIC BLOOD PRESSURE: 96 MMHG

## 2023-04-11 DIAGNOSIS — Z91.030 BEE STING ALLERGY: ICD-10-CM

## 2023-04-11 DIAGNOSIS — N18.31 STAGE 3A CHRONIC KIDNEY DISEASE (H): Primary | ICD-10-CM

## 2023-04-11 DIAGNOSIS — I10 HYPERTENSION GOAL BP (BLOOD PRESSURE) < 140/90: ICD-10-CM

## 2023-04-11 DIAGNOSIS — E11.22 TYPE 2 DIABETES MELLITUS WITH CHRONIC KIDNEY DISEASE, WITHOUT LONG-TERM CURRENT USE OF INSULIN, UNSPECIFIED CKD STAGE (H): ICD-10-CM

## 2023-04-11 LAB
ALBUMIN UR-MCNC: ABNORMAL MG/DL
APPEARANCE UR: CLEAR
BACTERIA #/AREA URNS HPF: ABNORMAL /HPF
BILIRUB UR QL STRIP: NEGATIVE
COLOR UR AUTO: YELLOW
GLUCOSE UR STRIP-MCNC: NEGATIVE MG/DL
HBA1C MFR BLD: 6 % (ref 0–5.6)
HGB UR QL STRIP: NEGATIVE
KETONES UR STRIP-MCNC: NEGATIVE MG/DL
LEUKOCYTE ESTERASE UR QL STRIP: NEGATIVE
NITRATE UR QL: NEGATIVE
PH UR STRIP: 5.5 [PH] (ref 5–7)
RBC #/AREA URNS AUTO: ABNORMAL /HPF
SP GR UR STRIP: 1.01 (ref 1–1.03)
SQUAMOUS #/AREA URNS AUTO: ABNORMAL /LPF
UROBILINOGEN UR STRIP-ACNC: 0.2 E.U./DL
WBC #/AREA URNS AUTO: ABNORMAL /HPF

## 2023-04-11 PROCEDURE — 36415 COLL VENOUS BLD VENIPUNCTURE: CPT | Performed by: FAMILY MEDICINE

## 2023-04-11 PROCEDURE — 82043 UR ALBUMIN QUANTITATIVE: CPT | Performed by: FAMILY MEDICINE

## 2023-04-11 PROCEDURE — 99214 OFFICE O/P EST MOD 30 MIN: CPT | Performed by: FAMILY MEDICINE

## 2023-04-11 PROCEDURE — 80061 LIPID PANEL: CPT | Performed by: FAMILY MEDICINE

## 2023-04-11 PROCEDURE — 83036 HEMOGLOBIN GLYCOSYLATED A1C: CPT | Performed by: FAMILY MEDICINE

## 2023-04-11 PROCEDURE — 80048 BASIC METABOLIC PNL TOTAL CA: CPT | Performed by: FAMILY MEDICINE

## 2023-04-11 PROCEDURE — 82570 ASSAY OF URINE CREATININE: CPT | Performed by: FAMILY MEDICINE

## 2023-04-11 PROCEDURE — 81001 URINALYSIS AUTO W/SCOPE: CPT | Performed by: FAMILY MEDICINE

## 2023-04-11 RX ORDER — GABAPENTIN 300 MG/1
CAPSULE ORAL
COMMUNITY
Start: 2023-04-07

## 2023-04-11 RX ORDER — EPINEPHRINE 1 MG/ML
0.3 INJECTION, SOLUTION INTRAMUSCULAR; SUBCUTANEOUS ONCE
Qty: 1 ML | Refills: 3 | Status: SHIPPED | OUTPATIENT
Start: 2023-04-11 | End: 2023-04-11

## 2023-04-11 RX ORDER — CARVEDILOL 3.12 MG/1
3.12 TABLET ORAL 2 TIMES DAILY WITH MEALS
Qty: 180 TABLET | Refills: 1 | Status: SHIPPED | OUTPATIENT
Start: 2023-04-11 | End: 2023-05-22

## 2023-04-11 RX ORDER — EPINEPHRINE 1 MG/ML
0.4 INJECTION, SOLUTION INTRAMUSCULAR; SUBCUTANEOUS ONCE
Qty: 1 ML | Refills: 3 | Status: SHIPPED | OUTPATIENT
Start: 2023-04-11 | End: 2023-04-11

## 2023-04-11 ASSESSMENT — PAIN SCALES - GENERAL: PAINLEVEL: NO PAIN (0)

## 2023-04-11 NOTE — TELEPHONE ENCOUNTER
Pharmacy calling regarding epinephrine that was prescribed today.    State that the auto injector pens come in 0.3mg.    The current prescription that was sent is not available.        Routing to provider         Fatou Fleming RN  [facility]

## 2023-04-11 NOTE — PROGRESS NOTES
"  Assessment & Plan     Hypertension goal BP (blood pressure) < 140/90  César Denis is a 72 year old male who presents today for follow-up on hypertension.  Blood pressure has been elevated recently at home and during his oncology clinic visits.  He is currently on losartan 100 mg.  Discussed with patient his concern  Blood pressure is not at goal  We will start Coreg 3.125 twice daily, follow-up in 2 weeks for ancillary blood pressure check if blood pressure still elevated increase Coreg to 6.25 mg twice daily.  Continue losartan 100 mg  Labs today  - Lipid panel reflex to direct LDL Non-fasting; Future  - carvedilol (COREG) 3.125 MG tablet; Take 1 tablet (3.125 mg) by mouth 2 times daily (with meals)  - Basic metabolic panel  (Ca, Cl, CO2, Creat, Gluc, K, Na, BUN); Future  - Lipid panel reflex to direct LDL Non-fasting  - Basic metabolic panel  (Ca, Cl, CO2, Creat, Gluc, K, Na, BUN)    Type 2 diabetes mellitus with chronic kidney disease, without long-term current use of insulin, unspecified CKD stage (H)    Doing well, diabetes well controlled  Continue metformin  Lab Results   Component Value Date    A1C 6.0 04/11/2023    A1C 6.3 09/09/2022    A1C 6.0 10/25/2021    A1C 6.3 08/03/2020    A1C 6.1 10/25/2019    A1C 6.2 04/12/2019    A1C 5.9 12/03/2018       - HEMOGLOBIN A1C; Future  - HEMOGLOBIN A1C    Stage 3a chronic kidney disease (H)  Stable    - UA reflex to Microscopic and Culture ([id number]); Future  - Adult Eye  Referral; Future  - Albumin Random Urine Quantitative with Creat Ratio; Future  - UA reflex to Microscopic and Culture ([id number])  - Albumin Random Urine Quantitative with Creat Ratio  - UA Microscopic with Reflex to Culture    Bee sting allergy  Refill EpiPen               BMI:   Estimated body mass index is 29.38 kg/m  as calculated from the following:    Height as of this encounter: 1.676 m (5' 6\").    Weight as of this encounter: 82.6 kg (182 lb).           Eden Ferguson MD  [facility] " "[facility]    Subjective   JUDE is a 72 year old, presenting for the following health issues:  Hypertension         View : No data to display.              History of Present Illness       Hypertension: He presents for follow up of hypertension.  He does check blood pressure  regularly outside of the clinic. Outside blood pressures have been over 140/90. He follows a low salt diet. He consumes 1 sweetened beverage(s) daily.He exercises with enough effort to increase his heart rate 20 to 29 minutes per day.  He exercises with enough effort to increase his heart rate 6 days per week.   He is taking medications regularly.               Review of Systems   Constitutional, HEENT, cardiovascular, pulmonary, gi and gu systems are negative, except as otherwise noted.      Objective    BP (!) 160/96   Pulse 100   Temp 98.2  F (36.8  C) (Tympanic)   Resp 18   Ht 1.676 m (5' 6\")   Wt 82.6 kg (182 lb)   SpO2 98%   BMI 29.38 kg/m    Body mass index is 29.38 kg/m .  Physical Exam  Vitals and nursing note reviewed.   Constitutional:       General: He is not in acute distress.     Appearance: Normal appearance. He is not ill-appearing, toxic-appearing or diaphoretic.   HENT:      Head: Normocephalic and atraumatic.   Neurological:      Mental Status: He is alert.                            "

## 2023-04-12 LAB
ANION GAP SERPL CALCULATED.3IONS-SCNC: 2 MMOL/L (ref 3–14)
BUN SERPL-MCNC: 22 MG/DL (ref 7–30)
CALCIUM SERPL-MCNC: 9.4 MG/DL (ref 8.5–10.1)
CHLORIDE BLD-SCNC: 105 MMOL/L (ref 94–109)
CHOLEST SERPL-MCNC: 161 MG/DL
CO2 SERPL-SCNC: 29 MMOL/L (ref 20–32)
CREAT SERPL-MCNC: 1.35 MG/DL (ref 0.66–1.25)
CREAT UR-MCNC: 131 MG/DL
FASTING STATUS PATIENT QL REPORTED: YES
GFR SERPL CREATININE-BSD FRML MDRD: 56 ML/MIN/1.73M2
GLUCOSE BLD-MCNC: 99 MG/DL (ref 70–99)
HDLC SERPL-MCNC: 76 MG/DL
LDLC SERPL CALC-MCNC: 61 MG/DL
MICROALBUMIN UR-MCNC: 32 MG/L
MICROALBUMIN/CREAT UR: 24.43 MG/G CR (ref 0–17)
NONHDLC SERPL-MCNC: 85 MG/DL
POTASSIUM BLD-SCNC: 3.9 MMOL/L (ref 3.4–5.3)
SODIUM SERPL-SCNC: 136 MMOL/L (ref 133–144)
TRIGL SERPL-MCNC: 118 MG/DL

## 2023-04-25 ENCOUNTER — ALLIED HEALTH/NURSE VISIT (OUTPATIENT)
Dept: FAMILY MEDICINE | Facility: CLINIC | Age: 73
End: 2023-04-25
Payer: COMMERCIAL

## 2023-04-25 VITALS — DIASTOLIC BLOOD PRESSURE: 92 MMHG | SYSTOLIC BLOOD PRESSURE: 144 MMHG

## 2023-04-25 DIAGNOSIS — I10 HYPERTENSION GOAL BP (BLOOD PRESSURE) < 140/90: Primary | ICD-10-CM

## 2023-04-25 PROCEDURE — 99207 PR NO CHARGE NURSE ONLY: CPT

## 2023-04-25 NOTE — PROGRESS NOTES
I met with César Denis at the request of Dr. Ferguson to recheck his blood pressure.  Blood pressure medications on the med list were reviewed with patient.    Patient has taken all medications as per usual regimen: Yes  Patient reports tolerating them without any issues or concerns: Yes    Vitals:    04/25/23 0938 04/25/23 0948   BP: (!) 144/96 (!) 144/92       After 5 minutes, the patient's blood pressure remained greater than or equal to 140/90.    Is the patient currently having any chest pain? No  Does the patient currently have a headache? No  Does the patient currently have any vision changes? No  Does the patient currently have any nausea? No  Does the patient currently have any abdominal pain? No    The previous encounter was reviewed.  The patient was discharged and the note will be sent to the provider for final review.   Patient has been checking BP at home and has been getting the same readings as above.    NELLI Rolle

## 2023-05-16 ENCOUNTER — ALLIED HEALTH/NURSE VISIT (OUTPATIENT)
Dept: FAMILY MEDICINE | Facility: CLINIC | Age: 73
End: 2023-05-16
Payer: COMMERCIAL

## 2023-05-16 VITALS — SYSTOLIC BLOOD PRESSURE: 138 MMHG | DIASTOLIC BLOOD PRESSURE: 82 MMHG | HEART RATE: 87 BPM

## 2023-05-16 DIAGNOSIS — Z01.30 BP CHECK: Primary | ICD-10-CM

## 2023-05-16 PROCEDURE — 99207 PR NO CHARGE NURSE ONLY: CPT

## 2023-05-16 NOTE — PROGRESS NOTES
SUBJECTIVE:  César Denis is an 72 year old male who presents for a blood pressure check.    The reason for the visit is:  a recent medication change    The patient reports that he IS taking the medication as prescribed.     Current Outpatient Medications   Medication     APREPITANT IV     carvedilol (COREG) 3.125 MG tablet     cetirizine (ZYRTEC) 10 MG CHEW     Coenzyme Q10 (COQ10 PO)     DEXAMETHASONE IO     fluticasone (FLONASE) 50 MCG/ACT nasal spray     gabapentin (NEURONTIN) 300 MG capsule     losartan (COZAAR) 100 MG tablet     metFORMIN (GLUCOPHAGE XR) 500 MG 24 hr tablet     PALONOSETRON HCL IV     pravastatin (PRAVACHOL) 40 MG tablet     raxibacumab 50 mg/mL     sildenafil (REVATIO) 20 MG tablet     VENTOLIN  (90 Base) MCG/ACT inhaler     No current facility-administered medications for this visit.       Allergies   Allergen Reactions     Asa [Aspirin] Other (See Comments)     Salivating and difficulty swallowing     Atorvastatin      Leg cramps     Bees          OBJECTIVE:  Please get a blood pressure AND a pulse.  A height is also needed if has not been done in the past year.    /82   Pulse 87         If patient has diagnosis of HYPERTENSION, is there a goal on the problem list? Yes  Patient Active Problem List   Diagnosis     Type 2 diabetes mellitus with chronic kidney disease, without long-term current use of insulin, unspecified CKD stage (H)     Hypertension goal BP (blood pressure) < 140/90     Non-Hodgkin's lymphoma (H)     CKD (chronic kidney disease) stage 3, GFR 30-59 ml/min (H)     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, unspecified non-follicular lymphoma type (H)     Other male erectile dysfunction       Plan:    Systolic BP    Greater than or equal to 100  OR Less than  140    Diastolic BP    Greater than or equal to 70 OR less than 90    Pulse    Pulse greater than 60 or less than 100      If all the above three parameters are met, patient to go home.  Chart CC to Primary Care Provider  If any one of the above three parameters is not met notify RN or provider.

## 2023-06-02 ENCOUNTER — TRANSFERRED RECORDS (OUTPATIENT)
Dept: HEALTH INFORMATION MANAGEMENT | Facility: CLINIC | Age: 73
End: 2023-06-02
Payer: COMMERCIAL

## 2023-06-22 ENCOUNTER — OFFICE VISIT (OUTPATIENT)
Dept: OPTOMETRY | Facility: CLINIC | Age: 73
End: 2023-06-22
Payer: COMMERCIAL

## 2023-06-22 DIAGNOSIS — C83.98 NON-FOLLICULAR LYMPHOMA OF LYMPH NODES OF MULTIPLE REGIONS, UNSPECIFIED NON-FOLLICULAR LYMPHOMA TYPE (H): ICD-10-CM

## 2023-06-22 DIAGNOSIS — H52.4 PRESBYOPIA: ICD-10-CM

## 2023-06-22 DIAGNOSIS — H52.223 REGULAR ASTIGMATISM OF BOTH EYES: ICD-10-CM

## 2023-06-22 DIAGNOSIS — Z01.01 VISION EXAM WITH ABNORMAL FINDINGS: Primary | ICD-10-CM

## 2023-06-22 DIAGNOSIS — E11.9 TYPE 2 DIABETES MELLITUS WITHOUT COMPLICATION, WITHOUT LONG-TERM CURRENT USE OF INSULIN (H): ICD-10-CM

## 2023-06-22 DIAGNOSIS — H11.153 PINGUECULA OF BOTH EYES: ICD-10-CM

## 2023-06-22 DIAGNOSIS — H25.13 NUCLEAR SCLEROTIC CATARACT OF BOTH EYES: ICD-10-CM

## 2023-06-22 PROCEDURE — 92015 DETERMINE REFRACTIVE STATE: CPT | Performed by: OPTOMETRIST

## 2023-06-22 PROCEDURE — 92014 COMPRE OPH EXAM EST PT 1/>: CPT | Performed by: OPTOMETRIST

## 2023-06-22 ASSESSMENT — KERATOMETRY
OD_AXISANGLE2_DEGREES: 27
OS_K1POWER_DIOPTERS: 44.50
OS_AXISANGLE2_DEGREES: 159
OD_K2POWER_DIOPTERS: 44.00
OD_K1POWER_DIOPTERS: 44.00
OS_K2POWER_DIOPTERS: 43.75

## 2023-06-22 ASSESSMENT — REFRACTION_MANIFEST
OS_AXIS: 173
OS_SPHERE: -0.50
OD_CYLINDER: +0.75
OD_AXIS: 173
OS_ADD: +2.00
OD_SPHERE: PLANO
OD_SPHERE: -0.50
OS_CYLINDER: +1.00
OS_AXIS: 175
OS_SPHERE: -0.25
OS_CYLINDER: +0.75
OD_CYLINDER: +1.00
OD_AXIS: 180
OD_ADD: +2.00

## 2023-06-22 ASSESSMENT — CONF VISUAL FIELD
OD_NORMAL: 1
OS_SUPERIOR_TEMPORAL_RESTRICTION: 0
OS_INFERIOR_NASAL_RESTRICTION: 0
METHOD: COUNTING FINGERS
OS_INFERIOR_TEMPORAL_RESTRICTION: 0
OD_SUPERIOR_NASAL_RESTRICTION: 0
OD_SUPERIOR_TEMPORAL_RESTRICTION: 0
OS_NORMAL: 1
OD_INFERIOR_TEMPORAL_RESTRICTION: 0
OD_INFERIOR_NASAL_RESTRICTION: 0
OS_SUPERIOR_NASAL_RESTRICTION: 0

## 2023-06-22 ASSESSMENT — VISUAL ACUITY
OD_SC: 20/50-1
OS_SC+: -1
OS_SC: 20/50
OS_SC: 20/30
OD_SC: 20/30
METHOD: SNELLEN - LINEAR
OD_SC+: -3

## 2023-06-22 ASSESSMENT — REFRACTION_WEARINGRX
OD_AXIS: 175
OD_CYLINDER: +0.75
OS_SPHERE: -1.25
OS_CYLINDER: +0.75
OS_AXIS: 170
SPECS_TYPE: SVL DISTANCE
OD_SPHERE: -0.75

## 2023-06-22 ASSESSMENT — TONOMETRY
OS_IOP_MMHG: 18
IOP_METHOD: APPLANATION
OD_IOP_MMHG: 18

## 2023-06-22 ASSESSMENT — SLIT LAMP EXAM - LIDS
COMMENTS: NORMAL
COMMENTS: NORMAL

## 2023-06-22 ASSESSMENT — CUP TO DISC RATIO
OD_RATIO: 0.2
OS_RATIO: 0.2

## 2023-06-22 ASSESSMENT — EXTERNAL EXAM - LEFT EYE: OS_EXAM: NORMAL

## 2023-06-22 ASSESSMENT — EXTERNAL EXAM - RIGHT EYE: OD_EXAM: NORMAL

## 2023-06-22 NOTE — PROGRESS NOTES
"Chief Complaint   Patient presents with     Diabetic Eye Exam        Chief Complaint(s) and History of Present Illness(es)     Diabetic Eye Exam            Vision: is stable    Diabetes Type: Type 2 and taking oral medications    Blood Sugars: is controlled               Lab Results   Component Value Date    A1C 6.0 04/11/2023    A1C 6.3 09/09/2022    A1C 6.0 10/25/2021    A1C 6.3 08/03/2020    A1C 6.1 10/25/2019    A1C 6.2 04/12/2019    A1C 5.9 12/03/2018           Last Eye Exam: 10/14/2019    Dilated Previously: Yes    What are you currently using to see?  does not use glasses or contacts, did mention that he has older Rx sunglasses     Distance Vision Acuity: Satisfied with vision    Near Vision Acuity: Satisfied with vision while reading and using computer unaided, does use magnifying glass for tiny print     Eye Comfort: good  Do you use eye drops? : Yes: on occasion   Occupation or Hobbies: Retired     [employer] Assistant      Medical, surgical and family histories reviewed and updated 6/22/2023.     On chemo for Non-Hodgkin's lymphoma-\" it keeps showing up \"    OBJECTIVE: See Ophthalmology exam    ASSESSMENT:    ICD-10-CM    1. Vision exam with abnormal findings  Z01.01 EYE EXAM (SIMPLE-NONBILLABLE)     REFRACTION      2. Regular astigmatism of both eyes  H52.223 EYE EXAM (SIMPLE-NONBILLABLE)     REFRACTION      3. Presbyopia  H52.4 EYE EXAM (SIMPLE-NONBILLABLE)     REFRACTION      4. Type 2 diabetes mellitus without complication, without long-term current use of insulin (H)  E11.9 EYE EXAM (SIMPLE-NONBILLABLE)     REFRACTION      5. 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, unspecified non-follicular lymphoma type (H)  C83.98 EYE EXAM (SIMPLE-NONBILLABLE)     REFRACTION      6. Nuclear sclerotic cataract of both eyes  H25.13 EYE EXAM (SIMPLE-NONBILLABLE)     REFRACTION      7. Pinguecula of both eyes  H11.153 EYE EXAM (SIMPLE-NONBILLABLE)     REFRACTION          PLAN:    César GARCIA" Cira aware  eye exam results will be sent to Eden Ferguson.  Patient Instructions   Fill glasses prescription   Monitor mild cataracts   Keep blood sugar under good control  Return in 1 year for diabetic eye exam      Blood sugar and blood pressure control are very important in the prevention of ocular complications from diabetes.       Janet Jung, OD  [phone]

## 2023-06-22 NOTE — LETTER
"    6/22/2023         RE: César Denis  [address]  [place] [address]        Dear Colleague,    Thank you for referring your patient, César Denis, to the [facility]. No diabetic retinopathy was found at eye exam. Mild cataracts in both eyes- monitor with yearly eye exam Please see a copy of my visit note below.    Chief Complaint   Patient presents with     Diabetic Eye Exam        Chief Complaint(s) and History of Present Illness(es)     Diabetic Eye Exam            Vision: is stable    Diabetes Type: Type 2 and taking oral medications    Blood Sugars: is controlled               Lab Results   Component Value Date    A1C 6.0 04/11/2023    A1C 6.3 09/09/2022    A1C 6.0 10/25/2021    A1C 6.3 08/03/2020    A1C 6.1 10/25/2019    A1C 6.2 04/12/2019    A1C 5.9 12/03/2018        Daughter joined us in room for last part of exam     Last Eye Exam: 10/14/2019    Dilated Previously: Yes    What are you currently using to see?  does not use glasses or contacts, did mention that he has older Rx sunglasses     Distance Vision Acuity: Satisfied with vision    Near Vision Acuity: Satisfied with vision while reading and using computer unaided, does use magnifying glass for tiny print     Eye Comfort: good  Do you use eye drops? : Yes: on occasion   Occupation or Hobbies: Retired     [employer] Assistant      Medical, surgical and family histories reviewed and updated 6/22/2023.     On chemo for Non-Hodgkin's lymphoma-\" it keeps showing up \"    OBJECTIVE: See Ophthalmology exam    ASSESSMENT:    ICD-10-CM    1. Vision exam with abnormal findings  Z01.01 EYE EXAM (SIMPLE-NONBILLABLE)     REFRACTION      2. Regular astigmatism of both eyes  H52.223 EYE EXAM (SIMPLE-NONBILLABLE)     REFRACTION      3. Presbyopia  H52.4 EYE EXAM (SIMPLE-NONBILLABLE)     REFRACTION      4. Type 2 diabetes mellitus without complication, without long-term current use of insulin (H)  E11.9 EYE EXAM " (SIMPLE-NONBILLABLE)     REFRACTION      5. 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, unspecified non-follicular lymphoma type (H)  C83.98 EYE EXAM (SIMPLE-NONBILLABLE)     REFRACTION      6. Nuclear sclerotic cataract of both eyes  H25.13 EYE EXAM (SIMPLE-NONBILLABLE)     REFRACTION      7. Pinguecula of both eyes  H11.153 EYE EXAM (SIMPLE-NONBILLABLE)     REFRACTION          PLAN:    César Denis aware  eye exam results will be sent to Eden Ferguson.  Patient Instructions   Fill glasses prescription   Monitor mild cataracts   Keep blood sugar under good control  Return in 1 year for diabetic eye exam      Blood sugar and blood pressure control are very important in the prevention of ocular complications from diabetes.       Janet Jung, OD  [phone]                                      Again, thank you for allowing me to participate in the care of your patient.        Sincerely,        Janet Jung, OD

## 2023-06-22 NOTE — PATIENT INSTRUCTIONS
Fill glasses prescription   Monitor mild cataracts   Keep blood sugar under good control  Return in 1 year for diabetic eye exam      Blood sugar and blood pressure control are very important in the prevention of ocular complications from diabetes.       Janet Jung, OD  [phone]

## 2023-06-23 ENCOUNTER — MYC MEDICAL ADVICE (OUTPATIENT)
Dept: FAMILY MEDICINE | Facility: CLINIC | Age: 73
End: 2023-06-23
Payer: COMMERCIAL

## 2023-06-24 NOTE — TELEPHONE ENCOUNTER
Tommy Lindsey  We need to make sure this is true value to avoid over treating and very low blood pressure   Please check blood pressure at home     Recommendation :    >Buy a blood pressure cuff and monitor your blood pressure at home.     >When checking your blood pressure at home:      upper arm cuff is preferred versus wrist cuff due to accuracy    Sit in a chair    Rest for 5 minutes    Avoid alcohol, nicotine, caffeine, exercise, food or drink 30 minutes prior    urinate prior to checking if needed    Elbow is at about heart level    Feet flat on the floor, no crossing legs or feet    No talking, minimal movement     Place cuff on bare skin     Send us a message in 1 week with daily readings     Eden Ferguson MD.

## 2023-06-27 ENCOUNTER — TRANSFERRED RECORDS (OUTPATIENT)
Dept: HEALTH INFORMATION MANAGEMENT | Facility: CLINIC | Age: 73
End: 2023-06-27
Payer: COMMERCIAL

## 2023-07-18 ENCOUNTER — TRANSFERRED RECORDS (OUTPATIENT)
Dept: HEALTH INFORMATION MANAGEMENT | Facility: CLINIC | Age: 73
End: 2023-07-18
Payer: COMMERCIAL

## 2023-07-21 ENCOUNTER — MEDICAL CORRESPONDENCE (OUTPATIENT)
Dept: HEALTH INFORMATION MANAGEMENT | Facility: CLINIC | Age: 73
End: 2023-07-21

## 2023-07-28 DIAGNOSIS — I10 HYPERTENSION GOAL BP (BLOOD PRESSURE) < 140/90: ICD-10-CM

## 2023-07-31 RX ORDER — LOSARTAN POTASSIUM 100 MG/1
100 TABLET ORAL DAILY
Qty: 90 TABLET | Refills: 1 | Status: SHIPPED | OUTPATIENT
Start: 2023-07-31 | End: 2024-02-13

## 2023-08-24 ENCOUNTER — NURSE TRIAGE (OUTPATIENT)
Dept: FAMILY MEDICINE | Facility: CLINIC | Age: 73
End: 2023-08-24

## 2023-08-24 NOTE — TELEPHONE ENCOUNTER
RN COVID TREATMENT VISIT  08/24/23      The patient has been triaged and does not require a higher level of care.    César Denis  72 year old  Current weight? 160 lbs    Has the patient been seen by a primary care provider at an [facility] or [facility] within the past two years? Yes.   Have you been in close proximity to/do you have a known exposure to a person with a confirmed case of influenza? No.     General treatment eligibility:  Date of positive COVID test (PCR or at home)?  8/24/23    Are you or have you been hospitalized for this COVID-19 infection? No.   Have you received monoclonal antibodies or antiviral treatment for COVID-19 since this positive test? No.   Do you have any of the following conditions that place you at risk of being very sick from COVID-19?   - Age 50 years or older  - Cancer/active malignancy including patients with cancer who are currently receiving chemotherapy or radiation, metastatic cancer, advance cancer and are receiving palliative care  - Chronic kidney disease (mild to moderate; eGFR or GFR 30-59 mL/min)  - Heart conditions such as cardiomyopathies, congenital heart defects, coronary artery disease, heart arrhythmias, heart failure, hypertension, valve disorders   Yes, patient has at least one high risk condition as noted above.     Current COVID symptoms:   - fever or chills  - cough  - fatigue  - muscle or body aches  - new loss of taste or smell  - congestion or runny nose  Yes. Patient has at least one symptom as selected.     How many days since symptoms started? 5 days or less. Established patient, 12 years or older weighing at least 88.2 lbs, who has symptoms that started in the past 5 days, has not been hospitalized nor received treatment already, and is at risk for being very sick from COVID-19.     Treatment eligibility by RN:  Are you currently pregnant or nursing? No  Do you have a clinically significant hypersensitivity to  nirmatrelvir or ritonavir, or toxic epidermal necrolysis (TEN) or Damon-Pranav Syndrome? No  Do you have a history of hepatitis, any hepatic impairment on the Problem List (such as Child-Desai Class C, cirrhosis, fatty liver disease, alcoholic liver disease), or was the last liver lab (hepatic panel, ALT, AST, ALK Phos, bilirubin) elevated in the past 6 months? No  Do you have any history of severe renal impairment (eGFR < 30mL/min)? No    Is patient eligible to continue? Yes, patient meets all eligibility requirements for the RN COVID treatment (as denoted by all no responses above).     Current Outpatient Medications   Medication Sig Dispense Refill     APREPITANT IV        carvedilol (COREG) 6.25 MG tablet Take 1 tablet (6.25 mg) by mouth 2 times daily (with meals) 180 tablet 1     cetirizine (ZYRTEC) 10 MG CHEW Take 10 mg by mouth daily       Coenzyme Q10 (COQ10 PO) Take 300 mg by mouth       DEXAMETHASONE IO        fluticasone (FLONASE) 50 MCG/ACT nasal spray Spray 1 spray into both nostrils daily       gabapentin (NEURONTIN) 300 MG capsule        losartan (COZAAR) 100 MG tablet Take 1 tablet (100 mg) by mouth daily 90 tablet 1     metFORMIN (GLUCOPHAGE XR) 500 MG 24 hr tablet Take 1 tablet (500 mg) by mouth daily (with dinner) 180 tablet 1     PALONOSETRON HCL IV        pravastatin (PRAVACHOL) 40 MG tablet Take 1 tablet (40 mg) by mouth At Bedtime 90 tablet 3     raxibacumab 50 mg/mL Inject into the vein once       sildenafil (REVATIO) 20 MG tablet Take 20 mg by mouth 3-5 tablets once daily as needed 30-60 mins before needed for ED       VENTOLIN  (90 Base) MCG/ACT inhaler INHALE 2 PUFFS BY MOUTH 4 TIMES DAILY 18 g 0       Medications from List 1 of the standing order (on medications that exclude the use of Paxlovid) that patient is taking: NONE. Is patient taking [place]'s Wort? No  Is patient taking Guera's Wort or any meds from List 1? No.   Medications from List 2 of the standing order (on  "meds that provider needs to adjust) that patient is taking: NONE. Is patient on any of the meds from List 2? No.   Medications from List 3 of standing order (on meds that a RN needs to adjust) that patient is taking: patient is possibly using dexamethasone IO. RN is uncertain as patient seems to not be sure if he uses this    Patient states he had his last infusion for treatment for lymphoma on 7/18/23.      RN scheduled patient for a virtual urgent care visit at 5:30 pm today, 8/24/23 to discuss covid treatment options with a provider.     AMRIT CrawfordN, RN  [facility]  Nurse Triage, [facility]    Additional Information    Negative: SEVERE difficulty breathing (e.g., struggling for each breath, speaks in single words)    Negative: Difficult to awaken or acting confused (e.g., disoriented, slurred speech)    Negative: Bluish (or gray) lips or face now    Negative: Shock suspected (e.g., cold/pale/clammy skin, too weak to stand, low BP, rapid pulse)    Negative: Sounds like a life-threatening emergency to the triager    Negative: [1] Diagnosed or suspected COVID-19 AND [2] symptoms lasting 3 or more weeks    Negative: [1] COVID-19 exposure AND [2] no symptoms    Negative: COVID-19 vaccine reaction suspected (e.g., fever, headache, muscle aches) occurring 1 to 3 days after getting vaccine    Negative: COVID-19 vaccine, questions about    Negative: [1] Lives with someone known to have influenza (flu test positive) AND [2] flu-like symptoms (e.g., cough, runny nose, sore throat, SOB; with or without fever)    Negative: [1] Adult with possible COVID-19 symptoms AND [2] triager concerned about severity of symptoms or other causes    Negative: COVID-19 and breastfeeding, questions about    Answer Assessment - Initial Assessment Questions  1. COVID-19 DIAGNOSIS: \"Who made your COVID-19 diagnosis?\" \"Was it confirmed by a positive lab test or self-test?\" If not diagnosed by a doctor (or " "NP/PA), ask \"Are there lots of cases (community spread) where you live?\" Note: See public health department website, if unsure.      At home test  2. COVID-19 EXPOSURE: \"Was there any known exposure to COVID before the symptoms began?\" CDC Definition of close contact: within 6 feet (2 meters) for a total of 15 minutes or more over a 24-hour period.       Patient was in [place]  3. ONSET: \"When did the COVID-19 symptoms start?\"       Tuesday night, 8/22/23   4. WORST SYMPTOM: \"What is your worst symptom?\" (e.g., cough, fever, shortness of breath, muscle aches)      Total lack of energy   5. COUGH: \"Do you have a cough?\" If Yes, ask: \"How bad is the cough?\"        Yes, productive cough  6. FEVER: \"Do you have a fever?\" If Yes, ask: \"What is your temperature, how was it measured, and when did it start?\"      99 or so  7. RESPIRATORY STATUS: \"Describe your breathing?\" (e.g., shortness of breath, wheezing, unable to speak)       No  8. BETTER-SAME-WORSE: \"Are you getting better, staying the same or getting worse compared to yesterday?\"  If getting worse, ask, \"In what way?\"      Same  9. HIGH RISK DISEASE: \"Do you have any chronic medical problems?\" (e.g., asthma, heart or lung disease, weak immune system, obesity, etc.)      Lymphoma, hypertension  10. VACCINE: \"Have you had the COVID-19 vaccine?\" If Yes, ask: \"Which one, how many shots, when did you get it?\"      COVID-19 MONOVALENT 12+ (Pfizer)8/14/2021, 3/29/2021, 3/9/2021  COVID-19 Monovalent 12+ (Pfizer 2022)4/8/2022  11. BOOSTER: \"Have you received your COVID-19 booster?\" If Yes, ask: \"Which one and when did you get it?\"       Patient has not received bivalent booster  12. PREGNANCY: \"Is there any chance you are pregnant?\" \"When was your last menstrual period?\"        No  13. OTHER SYMPTOMS: \"Do you have any other symptoms?\"  (e.g., chills, fatigue, headache, loss of smell or taste, muscle pain, sore throat)        Fatigue, loss of taste or smell  14. O2 SATURATION " "MONITOR:  \"Do you use an oxygen saturation monitor (pulse oximeter) at home?\" If Yes, ask \"What is your reading (oxygen level) today?\" \"What is your usual oxygen saturation reading?\" (e.g., 95%)        Yes    Protocols used: Coronavirus (COVID-19) Diagnosed or [id number]    "

## 2023-09-05 DIAGNOSIS — E78.00 ELEVATED LDL CHOLESTEROL LEVEL: ICD-10-CM

## 2023-09-05 RX ORDER — PRAVASTATIN SODIUM 40 MG
40 TABLET ORAL AT BEDTIME
Qty: 90 TABLET | Refills: 1 | Status: SHIPPED | OUTPATIENT
Start: 2023-09-05 | End: 2024-03-04

## 2023-09-11 ENCOUNTER — TELEPHONE (OUTPATIENT)
Dept: FAMILY MEDICINE | Facility: CLINIC | Age: 73
End: 2023-09-11
Payer: COMMERCIAL

## 2023-09-11 NOTE — TELEPHONE ENCOUNTER
"Patient was seen at [facility] on 9/7/23, and they did a PET scan and foind \"acute appendicitis on PET scan\" per  encounter and patient. Patient was told to reach out to PCP to have colonoscopy within 2-4 weeks. Needs referral placed, and probably an urgent one. Please advise and ready admission encounter from 9/7/23 if any questions.          Jayjay Mitchell      "

## 2023-09-13 NOTE — TELEPHONE ENCOUNTER
Patient called back and read providers note. Patient understands message and verbalizes good understanding of plan of care.     Scheduled patient a hospital follow up.    Future Appointments 9/13/2023 - 3/11/2024        Date Visit Type Length Department Provider     9/18/2023  9:30 AM ED/HOSP FOLLOW UP 30 min [facility] Sarai Hendrix MD    Location Instructions:     [facility] is located at [address] [place] [address], just north of the intersection with [facility]. The patient parking lot and entrance is on the building s north side.                   Radha Rooney RN

## 2023-09-13 NOTE — TELEPHONE ENCOUNTER
Called patient and left message for patient to return call.     RN please give provider message as written below and assist patient with hospital follow up.    He needs urgent hospital follow up appointment with next available provider   Eden Ferguson MD.       Radha Rooney RN

## 2023-09-15 ENCOUNTER — TRANSFERRED RECORDS (OUTPATIENT)
Dept: HEALTH INFORMATION MANAGEMENT | Facility: CLINIC | Age: 73
End: 2023-09-15
Payer: COMMERCIAL

## 2023-10-06 DIAGNOSIS — E11.22 TYPE 2 DIABETES MELLITUS WITH CHRONIC KIDNEY DISEASE, WITHOUT LONG-TERM CURRENT USE OF INSULIN, UNSPECIFIED CKD STAGE (H): ICD-10-CM

## 2023-10-06 RX ORDER — METFORMIN HCL 500 MG
500 TABLET, EXTENDED RELEASE 24 HR ORAL
Qty: 180 TABLET | Refills: 0 | Status: SHIPPED | OUTPATIENT
Start: 2023-10-06

## 2023-10-20 ENCOUNTER — TRANSFERRED RECORDS (OUTPATIENT)
Dept: HEALTH INFORMATION MANAGEMENT | Facility: CLINIC | Age: 73
End: 2023-10-20
Payer: COMMERCIAL

## 2023-11-07 ENCOUNTER — TRANSFERRED RECORDS (OUTPATIENT)
Dept: HEALTH INFORMATION MANAGEMENT | Facility: CLINIC | Age: 73
End: 2023-11-07

## 2023-11-07 LAB
ALT SERPL-CCNC: 33 U/L (ref 7–40)
AST SERPL-CCNC: 26 U/L (ref 13–40)
CREATININE (EXTERNAL): 0.97 MG/DL (ref 0.5–1.2)
GFR ESTIMATED (EXTERNAL): 83 ML/MIN/1.73M2
GLUCOSE (EXTERNAL): 105 MG/DL (ref 73–126)
POTASSIUM (EXTERNAL): 4.5 MMOL/L (ref 3.5–5.1)

## 2023-11-16 ENCOUNTER — PATIENT OUTREACH (OUTPATIENT)
Dept: FAMILY MEDICINE | Facility: CLINIC | Age: 73
End: 2023-11-16
Payer: COMMERCIAL

## 2023-11-16 NOTE — TELEPHONE ENCOUNTER
Patient Quality Outreach    Patient is due for the following:   Diabetes -  A1C and Foot Exam  Colon Cancer Screening  Physical Annual Wellness Visit      Topic Date Due    Flu Vaccine (1) 09/01/2023    COVID-19 Vaccine (5 - 2023-24 season) 09/01/2023       Next Steps:   Schedule a Annual Wellness Visit    Type of outreach:    Sent VigLink message.      Questions for provider review:    None           Roshni Fine, CMA

## 2023-11-29 DIAGNOSIS — I10 HYPERTENSION GOAL BP (BLOOD PRESSURE) < 140/90: ICD-10-CM

## 2023-11-29 RX ORDER — CARVEDILOL 6.25 MG/1
6.25 TABLET ORAL 2 TIMES DAILY WITH MEALS
Qty: 30 TABLET | Refills: 0 | Status: SHIPPED | OUTPATIENT
Start: 2023-11-29 | End: 2024-01-30

## 2023-12-04 ASSESSMENT — ENCOUNTER SYMPTOMS
CONSTIPATION: 0
WEAKNESS: 1
DIARRHEA: 0
SORE THROAT: 0
EYE PAIN: 0
DIZZINESS: 0
HEMATURIA: 0
HEMATOCHEZIA: 0
PALPITATIONS: 0
JOINT SWELLING: 0
FEVER: 0
CHILLS: 0
ABDOMINAL PAIN: 0
NERVOUS/ANXIOUS: 0
MYALGIAS: 0
SHORTNESS OF BREATH: 0
ARTHRALGIAS: 0
HEADACHES: 0
HEARTBURN: 0
FREQUENCY: 0
PARESTHESIAS: 0
COUGH: 0
NAUSEA: 0
DYSURIA: 0

## 2023-12-04 ASSESSMENT — ACTIVITIES OF DAILY LIVING (ADL): CURRENT_FUNCTION: NO ASSISTANCE NEEDED

## 2023-12-04 NOTE — COMMUNITY RESOURCES LIST (ENGLISH)
12/04/2023   [facility]  [facility]A  For questions about this resource list or additional care needs, please contact your primary care clinic or care manager.  Phone: [phone]   Email: N/A   Address: [facility] [place] [address]   Hours: N/A        Hotlines and Helplines       Hotline - Housing crisis  1  [facility] Housing Resource Line Distance: 8.14 miles      Phone/Virtual   [address] [place] [address]  Language: English  Hours: Mon - Sun Open 24 Hours   Phone: (905) 740-3617 Website: https://www.[facility]./2689/Basic-Needs     2  [facility] Distance: 12.69 miles      Phone/Virtual   [address] [place] [address]  Language: English  Hours: Mon - Sun Open 24 Hours   Phone: (695) 675-7336 Email: communications@[facility].org Website: https://[facility].org/oursaviourshousing/          Housing       Coordinated Entry access point  3  [facility]  Office - [facility] Distance: 0.72 miles      Phone/Virtual   [address] [place] [address]  Language: English  Hours: Mon - Fri 8:30 AM - 12:00 PM , Mon - Fri 1:00 PM - 4:00 PM  Fees: Free   Phone: (656) 452-4614 Ext.2 Email: anoop@[facility]erva.org Website: https://www.[facility] CenterClickyreservausa.org/usn/     4  [facility] ([facility] - Housing Services Distance: 12.82 miles      In-Person   [address] [place] [address]  Language: English  Hours: Mon - Fri 9:00 AM - 5:00 PM  Fees: Free   Phone: (626) 922-3629 Email: housing@[facility].org Website: http://www.[facility].org/housing     Drop-in center or day shelter  5  [facility] Distance: 11.25 miles      In-Person   [address] [place] [address]  Language: English, Hmong, Cypriot, Uruguayan  Hours: Mon - Thu 8:30 AM - 4:30 PM , Sat - Sun 9:00 AM - 12:00 PM  Fees: Free   Phone: (732) 981-5821 Email: info@mnlakeplace.com.org Website: https://mnlakeplace.com.org/     6   [facility] Distance: 12.27 miles      In-Person   [address] [place] [address]  Language: English, Chadian, Occitan  Hours: Mon - Sat 7:00 AM - 3:00 PM  Fees: Free, Self Pay   Phone: (102) 989-9862 Email: info@goBalto Website: https://www.Nearpod.Yummy77/locations/opportunity-center/     Housing search assistance  7  [facility] ([facility]) - [facility] Rental Housing Distance: 0.71 miles      In-Person, Phone/Virtual   [address] [place] [address]  Language: English  Hours: Mon - Fri 8:00 AM - 4:30 PM  Fees: Free   Phone: (744) 424-1922 Email: accap@accap.org Website: http://www.accap.org     8  Neighborhood Assistance [employer] [place] [employer] [place] ([place] Distance: 6.13 miles      Phone/Virtual   [address] [place] [address]  Language: English, Occitan  Hours: Mon - Fri 9:00 AM - 5:00 PM  Fees: Free   Phone: (970) 374-2879 Email: services@[employer] Website: https://www.Intelligroup     Shelter for families  9  [facility] Distance: 12.01 miles      In-Person   [address] [place] [address]  Language: English  Hours: Mon - Fri 3:00 PM - 9:00 AM , Sat - Sun Open 24 Hours  Fees: Free   Phone: (535) 462-5542 Ext.1 Website: https://www.saintandrews.org/2020/07/03/emergency-family-shelter/     Shelter for individuals  10  [facility] Distance: 11.57 miles      In-Person   [address] [place] [address]  Language: English  Hours: Mon - Fri 4:00 PM - 9:00 AM  Fees: Free   Phone: (739) 600-3980 Email: garima@[facility].org Website: https://[facility].org/[facility] Hospital[facility]/ 11  [facility] Distance: 11.59 miles      In-Person   [address] [place] [address]  Language: English  Hours: Mon - Sun 5:00 PM - 10:00  AM  Fees: Free, Self Pay   Phone: (857) 746-9755 Email: info@[employer] Website: https://www.Ebrun.com.org/locations/higher-ground-shelter/          Important Numbers & Websites       Emergency Services   911  [facility] Services   311  Poison Control   (641) 816-8252  Suicide Prevention Lifeline   (543) 991-4961 (TALK)  Child Abuse Hotline   (593) 823-8488 (4-A-Child)  Sexual Assault Hotline   (483) 990-9074 (HOPE)  National Runaway Safeline   (942) 112-6087 (RUNAWAY)  All-Options Talkline   (299) 734-6721  Substance Abuse Referral   (646) 817-6382 (HELP)

## 2023-12-11 ENCOUNTER — MYC MEDICAL ADVICE (OUTPATIENT)
Dept: FAMILY MEDICINE | Facility: CLINIC | Age: 73
End: 2023-12-11

## 2023-12-11 ENCOUNTER — OFFICE VISIT (OUTPATIENT)
Dept: FAMILY MEDICINE | Facility: CLINIC | Age: 73
End: 2023-12-11
Payer: COMMERCIAL

## 2023-12-11 VITALS
RESPIRATION RATE: 16 BRPM | HEIGHT: 66 IN | BODY MASS INDEX: 23.46 KG/M2 | HEART RATE: 88 BPM | TEMPERATURE: 97.6 F | OXYGEN SATURATION: 99 % | DIASTOLIC BLOOD PRESSURE: 72 MMHG | WEIGHT: 146 LBS | SYSTOLIC BLOOD PRESSURE: 115 MMHG

## 2023-12-11 DIAGNOSIS — Z12.5 SCREENING FOR PROSTATE CANCER: ICD-10-CM

## 2023-12-11 DIAGNOSIS — Z00.00 ENCOUNTER FOR MEDICARE ANNUAL WELLNESS EXAM: Primary | ICD-10-CM

## 2023-12-11 DIAGNOSIS — N40.1 BENIGN PROSTATIC HYPERPLASIA WITH URINARY FREQUENCY: Primary | ICD-10-CM

## 2023-12-11 DIAGNOSIS — R35.0 BENIGN PROSTATIC HYPERPLASIA WITH URINARY FREQUENCY: Primary | ICD-10-CM

## 2023-12-11 LAB
ALBUMIN SERPL BCG-MCNC: 3.9 G/DL (ref 3.5–5.2)
ALP SERPL-CCNC: 67 U/L (ref 40–150)
ALT SERPL W P-5'-P-CCNC: 15 U/L (ref 0–70)
ANION GAP SERPL CALCULATED.3IONS-SCNC: 12 MMOL/L (ref 7–15)
AST SERPL W P-5'-P-CCNC: 30 U/L (ref 0–45)
BILIRUB SERPL-MCNC: 0.4 MG/DL
BUN SERPL-MCNC: 12.7 MG/DL (ref 8–23)
CALCIUM SERPL-MCNC: 9.3 MG/DL (ref 8.8–10.2)
CHLORIDE SERPL-SCNC: 99 MMOL/L (ref 98–107)
CHOLEST SERPL-MCNC: 154 MG/DL
CREAT SERPL-MCNC: 1.04 MG/DL (ref 0.67–1.17)
DEPRECATED HCO3 PLAS-SCNC: 25 MMOL/L (ref 22–29)
EGFRCR SERPLBLD CKD-EPI 2021: 77 ML/MIN/1.73M2
FASTING STATUS PATIENT QL REPORTED: YES
GLUCOSE SERPL-MCNC: 107 MG/DL (ref 70–99)
HBA1C MFR BLD: 5.8 % (ref 0–5.6)
HDLC SERPL-MCNC: 32 MG/DL
HGB BLD-MCNC: 10.4 G/DL (ref 13.3–17.7)
LDLC SERPL CALC-MCNC: 99 MG/DL
NONHDLC SERPL-MCNC: 122 MG/DL
POTASSIUM SERPL-SCNC: 3.9 MMOL/L (ref 3.4–5.3)
PROT SERPL-MCNC: 6.6 G/DL (ref 6.4–8.3)
PSA SERPL DL<=0.01 NG/ML-MCNC: 2.98 NG/ML (ref 0–6.5)
SODIUM SERPL-SCNC: 136 MMOL/L (ref 135–145)
TRIGL SERPL-MCNC: 113 MG/DL

## 2023-12-11 PROCEDURE — 85018 HEMOGLOBIN: CPT | Performed by: FAMILY MEDICINE

## 2023-12-11 PROCEDURE — G0438 PPPS, INITIAL VISIT: HCPCS | Performed by: FAMILY MEDICINE

## 2023-12-11 PROCEDURE — G0008 ADMIN INFLUENZA VIRUS VAC: HCPCS | Performed by: FAMILY MEDICINE

## 2023-12-11 PROCEDURE — 90662 IIV NO PRSV INCREASED AG IM: CPT | Performed by: FAMILY MEDICINE

## 2023-12-11 PROCEDURE — 83036 HEMOGLOBIN GLYCOSYLATED A1C: CPT | Performed by: FAMILY MEDICINE

## 2023-12-11 PROCEDURE — G0103 PSA SCREENING: HCPCS | Performed by: FAMILY MEDICINE

## 2023-12-11 PROCEDURE — 80061 LIPID PANEL: CPT | Performed by: FAMILY MEDICINE

## 2023-12-11 PROCEDURE — 80053 COMPREHEN METABOLIC PANEL: CPT | Performed by: FAMILY MEDICINE

## 2023-12-11 PROCEDURE — 36415 COLL VENOUS BLD VENIPUNCTURE: CPT | Performed by: FAMILY MEDICINE

## 2023-12-11 ASSESSMENT — ENCOUNTER SYMPTOMS
NAUSEA: 0
SORE THROAT: 0
WEAKNESS: 1
HEADACHES: 0
DIARRHEA: 0
FREQUENCY: 0
NERVOUS/ANXIOUS: 0
COUGH: 0
HEMATURIA: 0
PALPITATIONS: 0
ARTHRALGIAS: 0
EYE PAIN: 0
DIZZINESS: 0
ABDOMINAL PAIN: 0
JOINT SWELLING: 0
HEARTBURN: 0
FEVER: 0
HEMATOCHEZIA: 0
DYSURIA: 0
SHORTNESS OF BREATH: 0
CONSTIPATION: 0
CHILLS: 0
PARESTHESIAS: 0
MYALGIAS: 0

## 2023-12-11 ASSESSMENT — ACTIVITIES OF DAILY LIVING (ADL): CURRENT_FUNCTION: NO ASSISTANCE NEEDED

## 2023-12-11 ASSESSMENT — PAIN SCALES - GENERAL: PAINLEVEL: NO PAIN (0)

## 2023-12-11 NOTE — PATIENT INSTRUCTIONS
Patient Education   Personalized Prevention Plan  You are due for the preventive services outlined below.  Your care team is available to assist you in scheduling these services.  If you have already completed any of these items, please share that information with your care team to update in your medical record.  Health Maintenance Due   Topic Date Due     RSV VACCINE (Pregnancy & 60+) (1 - 1-dose 60+ series) Never done     AORTIC ANEURYSM SCREENING (SYSTEM ASSIGNED)  Never done     Diabetic Foot Exam  10/25/2022     Flu Vaccine (1) 09/01/2023     COVID-19 Vaccine (5 - 2023-24 season) 09/01/2023     Annual Wellness Visit  09/09/2023     ANNUAL REVIEW OF HM ORDERS  09/09/2023     A1C Lab  10/11/2023     Hemoglobin  09/09/2023     Colorectal Cancer Screening  10/12/2023

## 2023-12-11 NOTE — COMMUNITY RESOURCES LIST (ENGLISH)
12/11/2023   [facility]  N/A  For questions about this resource list or additional care needs, please contact your primary care clinic or care manager.  Phone: [phone]   Email: N/A   Address: [facility] [place] [address]   Hours: N/A        Hotlines and Helplines       Hotline - Housing crisis  1  [facility] Housing Resource Line Distance: 8.14 miles      Phone/Virtual   [address] [place] [address]  Language: English  Hours: Mon - Sun Open 24 Hours   Phone: (884) 353-9717 Website: https://www.[facility]./2689/Basic-Needs     2  [facility] Distance: 12.69 miles      Phone/Virtual   [address] [place] [address]  Language: English  Hours: Mon - Sun Open 24 Hours   Phone: (159) 960-6262 Email: communications@[facility].org Website: https://[facility].org/oursaviourshousing/          Housing       Coordinated Entry access point  3  [facility]  Office - [facility] Distance: 0.72 miles      Phone/Virtual   [address] [place] [address]  Language: English  Hours: Mon - Fri 8:30 AM - 12:00 PM , Mon - Fri 1:00 PM - 4:00 PM  Fees: Free   Phone: (788) 302-7855 Ext.2 Email: anoop@[facility].org Website: https://www.InishTechT[facility]antic[facility]Silvercarusa.org/usn/     4  [facility] ([facility] - Housing Services Distance: 12.82 miles      In-Person   [address] [place] [address]  Language: English  Hours: Mon - Fri 9:00 AM - 5:00 PM  Fees: Free   Phone: (648) 287-6951 Email: housing@[facility].org Website: http://www.[facility].org/housing     Drop-in center or day shelter  5  [facility] Distance: 11.25 miles      In-Person   [address] [place] [address]  Language: English, Hmong, Costa Rican, Tongan  Hours: Mon - Thu 8:30 AM - 4:30 PM , Sat - Sun 9:00 AM - 12:00 PM  Fees: Free   Phone: (464) 950-2055 Email: info@[employer].org Website: https://Mayvenn.org/     6   [facility] Distance: 12.27 miles      In-Person   [address] [place] [address]  Language: English, Equatorial Guinean, Setswana  Hours: Mon - Sat 7:00 AM - 3:00 PM  Fees: Free, Self Pay   Phone: (205) 637-4779 Email: info@Mentor Me Website: https://www.MobileAds.Wallstr/locations/opportunity-center/     Housing search assistance  7  [facility] Community [facility] ([facility]) - [facility] Rental Housing Distance: 0.71 miles      In-Person, Phone/Virtual   [address] [place] [address]  Language: English  Hours: Mon - Fri 8:00 AM - 4:30 PM  Fees: Free   Phone: (405) 261-6698 Email: accap@accap.org Website: http://www.accap.org     8  Neighborhood Assistance [employer] [place] ([employer] Distance: 6.13 miles      Phone/Virtual   [address] [place] [address]  Language: English, Setswana  Hours: Mon - Fri 9:00 AM - 5:00 PM  Fees: Free   Phone: (481) 633-9380 Email: services@[employer] Website: https://www.Advice Wallet     Shelter for families  9  [facility] Distance: 12.01 miles      In-Person   [address] [place] [address]  Language: English  Hours: Mon - Fri 3:00 PM - 9:00 AM , Sat - Sun Open 24 Hours  Fees: Free   Phone: (111) 319-2397 Ext.1 Website: https://www.saintandrews.org/2020/07/03/emergency-family-shelter/     Shelter for individuals  10  [facility] Distance: 11.57 miles      In-Person   [address] [place] [address]  Language: English  Hours: Mon - Fri 4:00 PM - 9:00 AM  Fees: Free   Phone: (533) 222-3560 Email: garima@[facility].org Website: https://[facility].org/[facility]/ 11  [facility] and [place] - [facility] [place] [facility] Distance: 11.59 miles      In-Person   [address] [place] [address]  Language: English  Hours: Mon - Sun 5:00 PM - 10:00  AM  Fees: Free, Self Pay   Phone: (164) 895-8957 Email: info@[employer] Website: https://www.Personal.org/locations/higher-ground-shelter/          Important Numbers & Websites       Emergency Services   911  [facility] Services   311  Poison Control   (528) 534-3844  Suicide Prevention Lifeline   (615) 699-7337 (TALK)  Child Abuse Hotline   (758) 542-2766 (4-A-Child)  Sexual Assault Hotline   (953) 653-4644 (HOPE)  National Runaway Safeline   (377) 679-9802 (RUNAWAY)  All-Options Talkline   (119) 241-3239  Substance Abuse Referral   (292) 912-1132 (HELP)

## 2023-12-11 NOTE — COMMUNITY RESOURCES LIST (ENGLISH)
12/11/2023   [facility] - Outpatient Clinics  N/A  For additional resource needs, please contact your health insurance member services or your primary care team.  Phone: [phone]   Email: N/A   Address: [address] [place] [address]   Hours: N/A        Hotlines and Helplines       Hotline - Housing crisis  1  [facility] [place], [facility] Housing Resource Line Distance: 8.14 miles      Phone/Virtual   [address] [place] [address]  Language: English  Hours: Mon - Sun Open 24 Hours   Phone: (495) 162-1543 Website: https://www.[facility]./2689/Basic-Needs     2  [facility] Distance: 12.69 miles      Phone/Virtual   [address] [place] [address]  Language: English  Hours: Mon - Sun Open 24 Hours   Phone: (135) 939-2453 Email: communications@[facility].org Website: https://[facility].org/oursaviourshousing/          Housing       Coordinated Entry access point  3  [facility]  Office - [facility] [place] Distance: 0.72 miles      Phone/Virtual   [address] [place] [address]  Language: English  Hours: Mon - Fri 8:30 AM - 12:00 PM , Mon - Fri 1:00 PM - 4:00 PM  Fees: Free   Phone: (293) 708-2801 Ext.2 Email: anoop@[facility].org Website: https://www.[employer].org/usn/     4  [facility] ([facility] - Housing Services Distance: 12.82 miles      In-Person   [address] [place] [address]  Language: English  Hours: Mon - Fri 9:00 AM - 5:00 PM  Fees: Free   Phone: (145) 906-3490 Email: housing@[facility].org Website: http://www.[facility].org/housing     Drop-in center or day shelter  5  [facility] Distance: 11.25 miles      In-Person   [address] [place] [address]  Language: English, Hmong, Finnish, Nepali  Hours: Mon - Thu 8:30 AM - 4:30 PM , Sat - Sun 9:00 AM - 12:00 PM  Fees: Free   Phone: (256) 364-7920 Email: info@[employer].org Website: https://[employer].org/     6  [facility]  [facility] [facility] Distance: 12.27 miles      In-Person   [address] [place] [address]  Language: English, Barbadian, Greenlandic  Hours: Mon - Sat 7:00 AM - 3:00 PM  Fees: Free, Self Pay   Phone: (133) 920-2133 Email: info@[employer] Website: https://www.appsplit.prettysecrets/locations/opportunity-center/     Housing search assistance  7  [employer] - Online housing search assistance Distance: 11.53 miles      Phone/Virtual   [address] [place] [address]  Language: English, Hmong, Barbadian, Greenlandic  Hours: Mon - Sun Open 24 Hours   Phone: (953) 585-3194 Email: info@[employer].org Website: http://www.[employer].org/     8  Affordable Housing Online - https://[employer]/ Distance: 11.54 miles      Phone/Virtual   [address] [place] [address]  Language: English  Hours: Mon - Sun Open 24 Hours   Email: info@[employer] Website: https://[employer]     Shelter for families  9  [facility] Distance: 12.01 miles      In-Person   [address] [place] [address]  Language: English  Hours: Mon - Fri 3:00 PM - 9:00 AM , Sat - Sun Open 24 Hours  Fees: Free   Phone: (579) 103-3988 Ext.1 Website: https://www.[employer]aint[employer]rews.org/2020/07/03/emergency-family-shelter/     Shelter for individuals  10  [facility] Distance: 11.57 miles      In-Person   [address] [place] [address]  Language: English  Hours: Mon - Fri 4:00 PM - 9:00 AM  Fees: Free   Phone: (154) 163-8488 Email: garima@[facility].org Website: https://[facility].org/northern/[facility]/     11  [facility] [place] [facility] [place] Distance: 11.59 miles      In-Person   [address] [place] [address]  Language: English  Hours: Mon - Sun 5:00 PM - 10:00 AM  Fees: Free, Self Pay   Phone: (488) 447-7665 Email:  info@[employer].org Website: https://www.[employer].org/locations/higher-ground-shelter/          Important Numbers & Websites       [place] [address] [address].[facility]  Poison Control   (880) 995-4193 [place].org  Suicide and Crisis Lifeline   988 98[facility].org  [facility]help [place] Child Abuse Hotline   280.756.9143 Childhelphotline.org  [place] Sexual Assault Hotline   (496) 298-5336 (HOPE) [facility] Runaway Safeline   (399) 472-2249 (RUNAWAY) [facility].org  Pregnancy & Postpartum Support [place]   Call/text 588-158-6986 Ppsupport[place].org  Substance Abuse National Helpline ([facility]   723-108-HELP (4562) Findtreatment.gov  Emergency Services   912

## 2023-12-11 NOTE — PROGRESS NOTES
"SUBJECTIVE:   JUDE is a 72 year old, presenting for the following:  Medicare Visit        12/11/2023     9:04 AM   Additional Questions   Roomed by Roshni TAPIA   Accompanied by Self       Are you in the first 12 months of your Medicare coverage?  No    Healthy Habits:     In general, how would you rate your overall health?  Excellent    Frequency of exercise:  2-3 days/week    Duration of exercise:  15-30 minutes    Do you usually eat at least 4 servings of fruit and vegetables a day, include whole grains    & fiber and avoid regularly eating high fat or \"junk\" foods?  No    Taking medications regularly:  Yes    Medication side effects:  None    Ability to successfully perform activities of daily living:  No assistance needed    Home Safety:  No safety concerns identified    Hearing Impairment:  No hearing concerns    In the past 6 months, have you been bothered by leaking of urine? Yes    In general, how would you rate your overall mental or emotional health?  Fair    Additional concerns today:  Yes    Completed new chemo R-CHOP for lymphoma   Lost weight after starting new chemo -   Had false positive acute appendicitis on PET scan and CT - no symptoms - he did not have syrgery     Wt Readings from Last 4 Encounters:   12/11/23 66.2 kg (146 lb)   04/11/23 82.6 kg (182 lb)   02/10/23 86.2 kg (190 lb)   09/09/22 83.9 kg (185 lb)     Had low hemoglobin after chemo   Today's PHQ-2 Score:       12/10/2023     6:05 PM   PHQ-2 ( 1999 [employer])   Q1: Little interest or pleasure in doing things 0   Q2: Feeling down, depressed or hopeless 0   PHQ-2 Score 0   Q1: Little interest or pleasure in doing things Not at all   Q2: Feeling down, depressed or hopeless Not at all   PHQ-2 Score 0           Have you ever done Advance Care Planning? (For example, a Health Directive, POLST, or a discussion with a medical provider or your loved ones about your wishes): No, advance care planning information given to patient to review.  Patient " plans to discuss their wishes with loved ones or provider.         Fall risk  Fallen 2 or more times in the past year?: Yes  Any fall with injury in the past year?: No    Cognitive Screening   1) Repeat 3 items (Leader, Season, Table)    2) Clock draw: NORMAL  3) 3 item recall: Recalls 3 objects  Results: 3 items recalled: COGNITIVE IMPAIRMENT LESS LIKELY    Mini-CogTM Copyright HERNAN Kaplan. Licensed by the author for use in [facility]; reprinted with permission (jesus@[facility]). All rights reserved.      Do you have sleep apnea, excessive snoring or daytime drowsiness? : no    Reviewed and updated as needed this visit by clinical staff   Tobacco  Allergies  Meds              Reviewed and updated as needed this visit by Provider                 Social History     Tobacco Use    Smoking status: Never    Smokeless tobacco: Never   Substance Use Topics    Alcohol use: Not Currently             12/4/2023     1:14 PM   Alcohol Use   Prescreen: >3 drinks/day or >7 drinks/week? No     Do you have a current opioid prescription? No  Do you use any other controlled substances or medications that are not prescribed by a provider? None              Current providers sharing in care for this patient include:   Patient Care Team:  Eden Ferguson MD as PCP - General (Family Medicine)  Nelia Blackburn MD as MD (Hematology & Oncology)  Adwoa Martin APRN CNP as Nurse Practitioner (Dermatology)  Eden Ferguson MD as Assigned PCP  Janet Jung OD as Assigned Surgical Provider    The following health maintenance items are reviewed in Epic and correct as of today:  Health Maintenance   Topic Date Due    RSV VACCINE (Pregnancy & 60+) (1 - 1-dose 60+ series) Never done    AORTIC ANEURYSM SCREENING (SYSTEM ASSIGNED)  Never done    DIABETIC FOOT EXAM  10/25/2022    INFLUENZA VACCINE (1) 09/01/2023    COVID-19 Vaccine (5 - 2023-24 season) 09/01/2023    MEDICARE ANNUAL WELLNESS VISIT  09/09/2023    ANNUAL REVIEW OF   ORDERS  09/09/2023    A1C  10/11/2023    HEMOGLOBIN  09/09/2023    COLORECTAL CANCER SCREENING  10/12/2023    BMP  04/11/2024    LIPID  04/11/2024    MICROALBUMIN  04/11/2024    EYE EXAM  06/22/2024    FALL RISK ASSESSMENT  12/11/2024    ADVANCE CARE PLANNING  12/11/2028    DTAP/TDAP/TD IMMUNIZATION (3 - Td or Tdap) 04/08/2029    HEPATITIS C SCREENING  Completed    PHQ-2 (once per calendar year)  Completed    Pneumococcal Vaccine: 65+ Years  Completed    URINALYSIS  Completed    ZOSTER IMMUNIZATION  Completed    IPV IMMUNIZATION  Aged Out    HPV IMMUNIZATION  Aged Out    MENINGITIS IMMUNIZATION  Aged Out    RSV MONOCLONAL ANTIBODY  Aged Out     Lab work is in process  Labs reviewed in EPIC  BP Readings from Last 3 Encounters:   12/11/23 115/72   05/16/23 138/82   04/25/23 (!) 144/92    Wt Readings from Last 3 Encounters:   12/11/23 66.2 kg (146 lb)   04/11/23 82.6 kg (182 lb)   02/10/23 86.2 kg (190 lb)                  Patient Active Problem List   Diagnosis    Type 2 diabetes mellitus with chronic kidney disease, without long-term current use of insulin, unspecified CKD stage (H)    Hypertension goal BP (blood pressure) < 140/90    Non-Hodgkin's lymphoma (H)    CKD (chronic kidney disease) stage 3, GFR 30-59 ml/min (H)    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, unspecified non-follicular lymphoma type (H)    Other male erectile dysfunction     Past Surgical History:   Procedure Laterality Date    BIOPSY  January 2023    COLONOSCOPY  2018       Social History     Tobacco Use    Smoking status: Never    Smokeless tobacco: Never   Substance Use Topics    Alcohol use: Not Currently     Family History   Problem Relation Age of Onset    Cancer Mother     Other Cancer Mother         Pancreatic    Heart Disease Father     Coronary Artery Disease Father         conjestive HF    Other Cancer Father     Glaucoma No family hx of     Macular Degeneration No family hx of          Current Outpatient  Medications   Medication Sig Dispense Refill    APREPITANT IV       carvedilol (COREG) 6.25 MG tablet Take 1 tablet (6.25 mg) by mouth 2 times daily (with meals) 30 tablet 0    cetirizine (ZYRTEC) 10 MG CHEW Take 10 mg by mouth daily      Coenzyme Q10 (COQ10 PO) Take 300 mg by mouth      DEXAMETHASONE IO       fluticasone (FLONASE) 50 MCG/ACT nasal spray Spray 1 spray into both nostrils daily      gabapentin (NEURONTIN) 300 MG capsule       losartan (COZAAR) 100 MG tablet Take 1 tablet (100 mg) by mouth daily 90 tablet 1    metFORMIN (GLUCOPHAGE XR) 500 MG 24 hr tablet Take 1 tablet (500 mg) by mouth daily (with dinner) 180 tablet 0    PALONOSETRON HCL IV       pravastatin (PRAVACHOL) 40 MG tablet Take 1 tablet (40 mg) by mouth At Bedtime 90 tablet 1    raxibacumab 50 mg/mL Inject into the vein once      VENTOLIN  (90 Base) MCG/ACT inhaler INHALE 2 PUFFS BY MOUTH 4 TIMES DAILY 18 g 0     Allergies   Allergen Reactions    Asa [Aspirin] Other (See Comments)     Salivating and difficulty swallowing    Atorvastatin      Leg cramps    Bees      Recent Labs   Lab Test 12/11/23  0947 04/11/23  1147 09/09/22  0854 10/25/21  1123 10/25/21  1123 08/03/20  0957 10/25/19  1155 04/12/19  0839 12/03/18  0951   A1C 5.8* 6.0* 6.3*   < > 6.0* 6.3* 6.1*   < > 5.9*   LDL  --  61  --   --  101* 114* 116*   < > 138*   HDL  --  76  --   --  41 40 44   < > 42   TRIG  --  118  --   --  136 153* 155*   < > 210*   ALT  --   --  34  --  22  --  24  --  31   CR  --  1.35* 1.30*   < > 1.45*  --  1.30*  --  1.31*   GFRESTIMATED  --  56* 59*   < > 49*  --  56*  --  55*   GFRESTBLACK  --   --   --   --   --   --  65  --  66   POTASSIUM  --  3.9 4.7   < > 4.2  --  4.3  --  4.1   TSH  --   --   --   --  1.07 1.25  --   --  1.69    < > = values in this interval not displayed.              Review of Systems   Constitutional:  Negative for chills and fever.   HENT:  Negative for congestion, ear pain, hearing loss and sore throat.    Eyes:   "Negative for pain and visual disturbance.   Respiratory:  Negative for cough and shortness of breath.    Cardiovascular:  Negative for chest pain, palpitations and peripheral edema.   Gastrointestinal:  Negative for abdominal pain, constipation, diarrhea, heartburn, hematochezia and nausea.   Genitourinary:  Positive for urgency. Negative for dysuria, frequency, genital sores, hematuria, impotence and penile discharge.   Musculoskeletal:  Negative for arthralgias, joint swelling and myalgias.   Skin:  Negative for rash.   Neurological:  Positive for weakness. Negative for dizziness, headaches and paresthesias.   Psychiatric/Behavioral:  Negative for mood changes. The patient is not nervous/anxious.          OBJECTIVE:   /72   Pulse 88   Temp 97.6  F (36.4  C) (Tympanic)   Resp 16   Ht 1.676 m (5' 6\")   Wt 66.2 kg (146 lb)   SpO2 99%   BMI 23.57 kg/m   Estimated body mass index is 23.57 kg/m  as calculated from the following:    Height as of this encounter: 1.676 m (5' 6\").    Weight as of this encounter: 66.2 kg (146 lb).  Physical Exam  GENERAL: healthy, alert and no distress  NECK: no adenopathy, no asymmetry, masses, or scars and thyroid normal to palpation  RESP: lungs clear to auscultation - no rales, rhonchi or wheezes  CV: regular rate and rhythm, normal S1 S2, no S3 or S4, no murmur, click or rub, no peripheral edema and peripheral pulses strong  ABDOMEN: soft, nontender, no hepatosplenomegaly, no masses and bowel sounds normal  MS: no gross musculoskeletal defects noted, no edema        ASSESSMENT / PLAN:       ICD-10-CM    1. Encounter for Medicare annual wellness exam  Z00.00 HEMOGLOBIN A1C     Hemoglobin     Comprehensive metabolic panel     Lipid panel reflex to direct LDL Fasting     HEMOGLOBIN A1C     Hemoglobin     Comprehensive metabolic panel     Lipid panel reflex to direct LDL Fasting      2. Screening for prostate cancer  Z12.5 Prostate Specific Antigen Screen     Prostate Specific " "Antigen Screen          Patient has been advised of split billing requirements and indicates understanding: Yes      COUNSELING:  Reviewed preventive health counseling, as reflected in patient instructions       Immunizations  Vaccinated for: Influenza        BMI:   Estimated body mass index is 23.57 kg/m  as calculated from the following:    Height as of this encounter: 1.676 m (5' 6\").    Weight as of this encounter: 66.2 kg (146 lb).         He reports that he has never smoked. He has never used smokeless tobacco.      Appropriate preventive services were discussed with this patient, including applicable screening as appropriate for fall prevention, nutrition, physical activity, Tobacco-use cessation, weight loss and cognition.  Checklist reviewing preventive services available has been given to the patient.    Reviewed patients plan of care and provided an AVS. The Basic Care Plan (routine screening as documented in Health Maintenance) for César meets the Care Plan requirement. This Care Plan has been established and reviewed with the Patient.          Eden Ferguson MD  [facility]    Identified Health Risks:  I have reviewed Opioid Use Disorder and Substance Use Disorder risk factors and made any needed referrals.   "

## 2023-12-22 ENCOUNTER — TRANSFERRED RECORDS (OUTPATIENT)
Dept: HEALTH INFORMATION MANAGEMENT | Facility: CLINIC | Age: 73
End: 2023-12-22
Payer: COMMERCIAL

## 2023-12-22 RX ORDER — TAMSULOSIN HYDROCHLORIDE 0.4 MG/1
0.4 CAPSULE ORAL DAILY
Qty: 90 CAPSULE | Refills: 1 | Status: SHIPPED | OUTPATIENT
Start: 2023-12-22 | End: 2024-06-24

## 2024-01-12 ENCOUNTER — TRANSFERRED RECORDS (OUTPATIENT)
Dept: HEALTH INFORMATION MANAGEMENT | Facility: CLINIC | Age: 74
End: 2024-01-12

## 2024-01-12 ENCOUNTER — MYC MEDICAL ADVICE (OUTPATIENT)
Dept: FAMILY MEDICINE | Facility: CLINIC | Age: 74
End: 2024-01-12
Payer: COMMERCIAL

## 2024-01-15 NOTE — TELEPHONE ENCOUNTER
See MyChart messages below.  Patient was seen and evaluated by a provider at [facility] on Friday. Appears may have gone in for Urgent Care but was put on a Senior Care provider's schedule instead.   Notes and provider care plan available to view in Chart Review.  Message sent to patient to advise to let care team know if still having symptoms/higher heart rate after Friday's visit.      Yodit Hoover RN  Clinical Triage/Primary Care  [facility]

## 2024-01-30 DIAGNOSIS — I10 HYPERTENSION GOAL BP (BLOOD PRESSURE) < 140/90: ICD-10-CM

## 2024-01-30 RX ORDER — CARVEDILOL 6.25 MG/1
6.25 TABLET ORAL 2 TIMES DAILY WITH MEALS
Qty: 180 TABLET | Refills: 0 | Status: SHIPPED | OUTPATIENT
Start: 2024-01-30 | End: 2024-03-21

## 2024-02-13 DIAGNOSIS — I10 HYPERTENSION GOAL BP (BLOOD PRESSURE) < 140/90: ICD-10-CM

## 2024-02-13 RX ORDER — LOSARTAN POTASSIUM 100 MG/1
100 TABLET ORAL DAILY
Qty: 90 TABLET | Refills: 1 | Status: SHIPPED | OUTPATIENT
Start: 2024-02-13 | End: 2024-03-21

## 2024-03-04 DIAGNOSIS — E78.00 ELEVATED LDL CHOLESTEROL LEVEL: ICD-10-CM

## 2024-03-04 RX ORDER — PRAVASTATIN SODIUM 40 MG
40 TABLET ORAL AT BEDTIME
Qty: 90 TABLET | Refills: 2 | Status: SHIPPED | OUTPATIENT
Start: 2024-03-04

## 2024-03-21 ENCOUNTER — NURSE TRIAGE (OUTPATIENT)
Dept: FAMILY MEDICINE | Facility: CLINIC | Age: 74
End: 2024-03-21

## 2024-03-21 ENCOUNTER — OFFICE VISIT (OUTPATIENT)
Dept: FAMILY MEDICINE | Facility: CLINIC | Age: 74
End: 2024-03-21
Payer: COMMERCIAL

## 2024-03-21 VITALS
DIASTOLIC BLOOD PRESSURE: 79 MMHG | RESPIRATION RATE: 16 BRPM | HEIGHT: 66 IN | SYSTOLIC BLOOD PRESSURE: 134 MMHG | OXYGEN SATURATION: 98 % | WEIGHT: 148 LBS | HEART RATE: 98 BPM | BODY MASS INDEX: 23.78 KG/M2 | TEMPERATURE: 98.7 F

## 2024-03-21 DIAGNOSIS — I10 HYPERTENSION GOAL BP (BLOOD PRESSURE) < 140/90: ICD-10-CM

## 2024-03-21 DIAGNOSIS — R42 DIZZINESS: Primary | ICD-10-CM

## 2024-03-21 PROCEDURE — 99213 OFFICE O/P EST LOW 20 MIN: CPT | Performed by: FAMILY MEDICINE

## 2024-03-21 RX ORDER — CARVEDILOL 6.25 MG/1
6.25 TABLET ORAL
COMMUNITY
Start: 2024-03-21 | End: 2024-03-21

## 2024-03-21 RX ORDER — CARVEDILOL 3.12 MG/1
3.12 TABLET ORAL 2 TIMES DAILY WITH MEALS
Qty: 180 TABLET | Refills: 0 | Status: SHIPPED | OUTPATIENT
Start: 2024-03-21 | End: 2024-06-24

## 2024-03-21 RX ORDER — LOSARTAN POTASSIUM 25 MG/1
25 TABLET ORAL EVERY MORNING
Qty: 90 TABLET | Refills: 1 | Status: SHIPPED | OUTPATIENT
Start: 2024-03-21 | End: 2024-09-10

## 2024-03-21 ASSESSMENT — PAIN SCALES - GENERAL: PAINLEVEL: NO PAIN (0)

## 2024-03-21 NOTE — TELEPHONE ENCOUNTER
There is no consent to communicate with anyone but the patient.  (TD-3/21/2024)     This encounter is being created due to the [employer] message dated 3/21/2024 as written below:       Patient reports that when he get up from a laying or seated position he feels a little dizzy and has to hang onto the wall.  The dizziness lasts about 1-2 minutes and resolves on it's own. This has been going for about 2-4 weeks. If he does take time to sit for a little, this will help, but he can't always do that as he has to use the restroom right away. There has been no changes in his medication in the time frame this has been happening outside of starting tamsulosin about 1 week ago.       He is taking all his medication as he should. He doesn't know why this is happening. Had an EKG on 1/12/2024 that was good - per patient.  BP today was 127/70.     He does report that he can't shut eyes in the shower without getting off balance - since chemo.      10/20/2024 last chemo -     Non-Hodgkin's lymphoma (H)     He gets his blood drawn next week. [place] [facility]. He did have low CBC values due to chemo this may have something t do with it.     Denies: chest pain     carvedilol (COREG) 6.25 MG tablet he takes in the AM  - He switched to once a day a couple of month ago due to BP being too low - per oncology.  He was to reach out to the primary, but he jut made this adjustment. I have made this change in EPIC  losartan (COZAAR) 100 MG tablet - 1 tablet daily in the evening.     Nursing advice: Patient to be seen in clinic due to symptoms and medical history. He was assisted in making the appointment below.  If he worsens or new symptoms arise he is to go to the hospital.  Patient verbalized good understanding, agrees with plan and needs no further support.  Thank you. Dagmar Lopez R.N.      Next 5 appointments (look out 90 days)      Mar 21, 2024  4:00 PM  (Arrive by 3:40 PM)  Provider Visit with Eden Ferguson MD  [facility]  [facility] ) [address] [facility] [address] [phone]              Reason for Disposition   Taking a medicine that could cause dizziness (e.g., blood pressure medications, diuretics)    Additional Information   Negative: SEVERE difficulty breathing (e.g., struggling for each breath, speaks in single words)   Negative: Shock suspected (e.g., cold/pale/clammy skin, too weak to stand, low BP, rapid pulse)   Negative: Difficult to awaken or acting confused (e.g., disoriented, slurred speech)   Negative: Fainted, and still feels dizzy afterwards   Negative: Overdose (accidental or intentional) of medications   Negative: New neurologic deficit that is present now: * Weakness of the face, arm, or leg on one side of the body * Numbness of the face, arm, or leg on one side of the body * Loss of speech or garbled speech   Negative: Heart beating < 50 beats per minute OR > 140 beats per minute   Negative: Sounds like a life-threatening emergency to the triager   Negative: SEVERE dizziness (e.g., unable to stand, requires support to walk, feels like passing out now)   Negative: SEVERE headache or neck pain   Negative: Spinning or tilting sensation (vertigo) present now and one or more stroke risk factors (i.e., hypertension, diabetes mellitus, prior stroke/TIA, heart attack, age over 60) (Exception: Prior physician evaluation for this AND no different/worse than usual.)   Negative: Neurologic deficit that was brief (now gone), ANY of the following:* Weakness of the face, arm, or leg on one side of the body* Numbness of the face, arm, or leg on one side of the body* Loss of speech or garbled speech   Negative: Loss of vision or double vision  (Exception: Similar to previous migraines.)   Negative: Extra heartbeats, irregular heart beating, or heart is beating very fast (i.e., 'palpitations')   Negative: Difficulty breathing   Negative: Drinking very little and dehydration  suspected (e.g., no urine > 12 hours, very dry mouth, very lightheaded)   Negative: Follows bleeding (e.g., stomach, rectum, vagina)  (Exception: Became dizzy from sight of small amount blood.)   Negative: Patient sounds very sick or weak to the triager   Negative: Lightheadedness (dizziness) present now, after 2 hours of rest and fluids   Negative: Spinning or tilting sensation (vertigo) present now   Negative: Fever > 103 F (39.4 C)   Negative: Fever > 100.0 F (37.8 C) and has diabetes mellitus or a weak immune system (e.g., HIV positive, cancer chemotherapy, organ transplant, splenectomy, chronic steroids)   Negative: MODERATE dizziness (e.g., interferes with normal activities)  (Exception: Dizziness caused by heat exposure, sudden standing, or poor fluid intake.)   Negative: Vomiting occurs with dizziness   Negative: Patient wants to be seen    Protocols used: Dizziness-A-OH

## 2024-03-21 NOTE — PATIENT INSTRUCTIONS
I think your symptoms are related to blood pressure medication and we should cut back on BP gradually     - Decrease Losartan form 100 mg to 25 mg daily and take it in the morning     - Decrease Coreg form 6.25 mg to 3.125 mg 2 times daily     - check blood pressure 3 times daily     - Return in 2 weeks for ancillary blood pressure check

## 2024-03-21 NOTE — PROGRESS NOTES
Assessment & Plan     Dizziness  César Denis is a 73 year old male who presents today for relation of dizziness.  He denies any syncope.  Symptoms are worse when standing.  Patient believes symptoms are related to hypertension.  Symptoms likely related to blood pressure medication in the setting of recent weight loss.  I recommend reducing blood pressure medications as below.  Hypertension goal BP (blood pressure) < 140/90  Blood pressure medication   Coreg 6.5 mg BID   Losartan 100 mg daily   Will lower losartan dose to 25 mg daily, and Coreg to 3.125 mg twice daily.  Advised patient to check blood pressure after using the medication dose,  ancillary blood pressure check in 2 weeks.  - losartan (COZAAR) 25 MG tablet; Take 1 tablet (25 mg) by mouth every morning  - carvedilol (COREG) 3.125 MG tablet; Take 1 tablet (3.125 mg) by mouth 2 times daily (with meals)                  Sylvia ROQUE is a 73 year old, presenting for the following health issues:  Dizziness and Hypertension        3/21/2024     3:39 PM   Additional Questions   Roomed by Roshni TAPIA   Accompanied by Wife-Ana Rosa     History of Present Illness       Hypertension: He presents for follow up of hypertension.  He does check blood pressure  regularly outside of the clinic. Outpatient blood pressures have not been over 140/90. He follows a low salt diet.     He eats 0-1 servings of fruits and vegetables daily.He consumes 1 sweetened beverage(s) daily.He exercises with enough effort to increase his heart rate 20 to 29 minutes per day.  He exercises with enough effort to increase his heart rate 3 or less days per week.   He is taking medications regularly.     03/17/2024 strted Flomax     Dizziness started 2 weeks  decribed as lack of balance   Daily           Wt Readings from Last 4 Encounters:   03/21/24 67.1 kg (148 lb)   12/11/23 66.2 kg (146 lb)   04/11/23 82.6 kg (182 lb)   02/10/23 86.2 kg (190 lb)     Description:   Feeling faint:  no  "  Feeling like the surroundings are moving: no   Worse with activity/head movement: YES- with standing and moving   Loss of consciousness: no   Falls: no   Nausea/vomitting: no   Vision or speech changes: no   Ringing in ears (Tinnitus): no   Hearing loss related to dizziness: no   New medications which may be causing symptoms: YES - flomax   Therapies tried and outcome: None  Started new chemo     Review of Systems  Constitutional, HEENT, cardiovascular, pulmonary, gi and gu systems are negative, except as otherwise noted.      Objective    /79   Pulse 98   Temp 98.7  F (37.1  C) (Tympanic)   Resp 16   Ht 1.676 m (5' 6\")   Wt 67.1 kg (148 lb)   SpO2 98%   BMI 23.89 kg/m    Body mass index is 23.89 kg/m .  Physical Exam   GENERAL: alert and no distress  NECK: no adenopathy, no asymmetry, masses, or scars  RESP: lungs clear to auscultation - no rales, rhonchi or wheezes  CV: regular rate and rhythm, normal S1 S2, no S3 or S4, no murmur, click or rub, no peripheral edema  ABDOMEN: soft, nontender, no hepatosplenomegaly, no masses and bowel sounds normal  MS: no gross musculoskeletal defects noted, no edema            Signed Electronically by: Eden Ferguson MD    "

## 2024-04-04 ENCOUNTER — ALLIED HEALTH/NURSE VISIT (OUTPATIENT)
Dept: FAMILY MEDICINE | Facility: CLINIC | Age: 74
End: 2024-04-04
Payer: COMMERCIAL

## 2024-04-04 VITALS — SYSTOLIC BLOOD PRESSURE: 120 MMHG | DIASTOLIC BLOOD PRESSURE: 77 MMHG | HEART RATE: 82 BPM

## 2024-04-04 DIAGNOSIS — I10 HYPERTENSION GOAL BP (BLOOD PRESSURE) < 140/90: Primary | ICD-10-CM

## 2024-04-04 PROCEDURE — 99207 PR NO CHARGE NURSE ONLY: CPT

## 2024-04-04 NOTE — PROGRESS NOTES
I met with César Denis at the request of Dr. Ferguson to recheck his blood pressure.  Blood pressure medications on the med list were reviewed with patient.    Patient has taken all medications as per usual regimen: Yes  Patient reports tolerating them without any issues or concerns: Yes    Vitals:    04/04/24 1006   BP: 120/77   Pulse: 82     Patient brought in blood pressure readings to review. Placed on providers desk for review.     Blood pressure was taken, previous encounter was reviewed, recorded blood pressure below 140/90.  Patient was discharged and the note will be sent to the provider for final review.

## 2024-04-29 ENCOUNTER — OFFICE VISIT (OUTPATIENT)
Dept: FAMILY MEDICINE | Facility: CLINIC | Age: 74
End: 2024-04-29
Payer: COMMERCIAL

## 2024-04-29 VITALS
RESPIRATION RATE: 16 BRPM | HEART RATE: 79 BPM | BODY MASS INDEX: 24.11 KG/M2 | OXYGEN SATURATION: 97 % | HEIGHT: 66 IN | DIASTOLIC BLOOD PRESSURE: 72 MMHG | SYSTOLIC BLOOD PRESSURE: 130 MMHG | WEIGHT: 150 LBS | TEMPERATURE: 97.1 F

## 2024-04-29 DIAGNOSIS — Z01.818 PREOP GENERAL PHYSICAL EXAM: Primary | ICD-10-CM

## 2024-04-29 DIAGNOSIS — Z95.828 PORT-A-CATH IN PLACE: ICD-10-CM

## 2024-04-29 DIAGNOSIS — Z12.11 COLON CANCER SCREENING: ICD-10-CM

## 2024-04-29 DIAGNOSIS — E11.22 TYPE 2 DIABETES MELLITUS WITH CHRONIC KIDNEY DISEASE, WITHOUT LONG-TERM CURRENT USE OF INSULIN, UNSPECIFIED CKD STAGE (H): ICD-10-CM

## 2024-04-29 LAB
BASOPHILS # BLD AUTO: 0.1 10E3/UL (ref 0–0.2)
BASOPHILS NFR BLD AUTO: 1 %
EOSINOPHIL # BLD AUTO: 0.2 10E3/UL (ref 0–0.7)
EOSINOPHIL NFR BLD AUTO: 4 %
ERYTHROCYTE [DISTWIDTH] IN BLOOD BY AUTOMATED COUNT: 16.3 % (ref 10–15)
HBA1C MFR BLD: 5.8 % (ref 0–5.6)
HCT VFR BLD AUTO: 32.9 % (ref 40–53)
HGB BLD-MCNC: 10.3 G/DL (ref 13.3–17.7)
IMM GRANULOCYTES # BLD: 0.1 10E3/UL
IMM GRANULOCYTES NFR BLD: 1 %
LYMPHOCYTES # BLD AUTO: 0.3 10E3/UL (ref 0.8–5.3)
LYMPHOCYTES NFR BLD AUTO: 6 %
MCH RBC QN AUTO: 25.1 PG (ref 26.5–33)
MCHC RBC AUTO-ENTMCNC: 31.3 G/DL (ref 31.5–36.5)
MCV RBC AUTO: 80 FL (ref 78–100)
MONOCYTES # BLD AUTO: 1 10E3/UL (ref 0–1.3)
MONOCYTES NFR BLD AUTO: 21 %
NEUTROPHILS # BLD AUTO: 3.3 10E3/UL (ref 1.6–8.3)
NEUTROPHILS NFR BLD AUTO: 67 %
NRBC # BLD AUTO: 0 10E3/UL
NRBC BLD AUTO-RTO: 0 /100
PLATELET # BLD AUTO: 343 10E3/UL (ref 150–450)
RBC # BLD AUTO: 4.1 10E6/UL (ref 4.4–5.9)
WBC # BLD AUTO: 4.9 10E3/UL (ref 4–11)

## 2024-04-29 PROCEDURE — 82947 ASSAY GLUCOSE BLOOD QUANT: CPT | Performed by: FAMILY MEDICINE

## 2024-04-29 PROCEDURE — 36415 COLL VENOUS BLD VENIPUNCTURE: CPT | Performed by: FAMILY MEDICINE

## 2024-04-29 PROCEDURE — 99214 OFFICE O/P EST MOD 30 MIN: CPT | Performed by: FAMILY MEDICINE

## 2024-04-29 PROCEDURE — 83036 HEMOGLOBIN GLYCOSYLATED A1C: CPT | Performed by: FAMILY MEDICINE

## 2024-04-29 PROCEDURE — 85025 COMPLETE CBC W/AUTO DIFF WBC: CPT | Performed by: FAMILY MEDICINE

## 2024-04-29 ASSESSMENT — PAIN SCALES - GENERAL: PAINLEVEL: NO PAIN (0)

## 2024-04-29 NOTE — PROGRESS NOTES
Preoperative Evaluation  [facility]  [address] [facility] [address]  Phone: [phone]  Primary Provider: Ray Ferguson  Pre-op Performing Provider: RAY FERGUSON  Apr 29, 2024       JUDE is a 73 year old, presenting for the following:  Pre-Op Exam        4/29/2024     2:19 PM   Additional Questions   Roomed by Roshni TAPIA   Accompanied by Self     Surgical Information  Surgery/Procedure: port removal   Surgery Location: [facility]   Surgeon: MARY  Surgery Date: 5/3/24  Time of Surgery: 0900  Where patient plans to recover: At home with family  Fax number for surgical facility: [facility]     Assessment & Plan     The proposed surgical procedure is considered LOW risk.    Preop general physical exam  There is no contraindication for the procedure .    - CBC with Platelets & Differential; Future  - CBC with Platelets & Differential    Port-A-Cath in place  Scheduled for Port-A-Cath removal    Type 2 diabetes mellitus with chronic kidney disease, without long-term current use of insulin, unspecified CKD stage (H)    - Random Glucose; Future  - Hemoglobin A1c; Future  - Random Glucose  - Hemoglobin A1c    Colon cancer screening    - Colonoscopy Screening  Referral; Future            - No identified additional risk factors other than previously addressed    Antiplatelet or Anticoagulation Medication Instructions   - Patient is on no antiplatelet or anticoagulation medications.    Additional Medication Instructions   - ACE/ARB: HOLD on day of surgery (minimum 11 hours for general anesthesia).   - Beta Blockers: Continue taking on the day of surgery.   - Statins: Continue taking on the day of surgery.    - metformin: HOLD day of surgery.    Recommendation  APPROVAL GIVEN to proceed with proposed procedure, without further diagnostic evaluation.          Subjective       HPI related to upcoming procedure:     Jude Denis is a 73 year old male who presents today  for preop exam   He is going for port removal         4/29/2024     2:13 PM   Preop Questions   1. Have you ever had a heart attack or stroke? No   2. Have you ever had surgery on your heart or blood vessels, such as a stent placement, a coronary artery bypass, or surgery on an artery in your head, neck, heart, or legs? No   3. Do you have chest pain with activity? No   4. Do you have a history of  heart failure? No   5. Do you currently have a cold, bronchitis or symptoms of other infection? No   6. Do you have a cough, shortness of breath, or wheezing? No   7. Do you or anyone in your family have previous history of blood clots? No   8. Do you or does anyone in your family have a serious bleeding problem such as prolonged bleeding following surgeries or cuts? No   9. Have you ever had problems with anemia or been told to take iron pills? YES -    10. Have you had any abnormal blood loss such as black, tarry or bloody stools? No   11. Have you ever had a blood transfusion? No   12. Are you willing to have a blood transfusion if it is medically needed before, during, or after your surgery? Yes   13. Have you or any of your relatives ever had problems with anesthesia? No   14. Do you have sleep apnea, excessive snoring or daytime drowsiness? No   15. Do you have any artifical heart valves or other implanted medical devices like a pacemaker, defibrillator, or continuous glucose monitor? No   16. Do you have artificial joints? No   17. Are you allergic to latex? YES:      Health Care Directive  Patient does not have a Health Care Directive or Living Will: Patient states has Advance Directive and will bring in a copy to clinic.    Preoperative Review of    reviewed - controlled substances reflected in medication list.      Status of Chronic Conditions:  See problem list for active medical problems.  Problems all longstanding and stable, except as noted/documented.  See ROS for pertinent symptoms related to these  conditions.    DIABETES - Patient has a longstanding history of DiabetesType Type II . Patient is being treated with oral agents and denies significant side effects. Control has been good. Complicating factors include but are not limited to: hypertension.     HYPERTENSION - Patient has longstanding history of HTN , currently denies any symptoms referable to elevated blood pressure. Specifically denies chest pain, palpitations, dyspnea, orthopnea, PND or peripheral edema. Blood pressure readings have been in normal range. Current medication regimen is as listed below. Patient denies any side effects of medication.     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, unspecified non-follicular lymphoma type (H) 12/03/2018     Priority: Medium    Other male erectile dysfunction 12/03/2018     Priority: Medium    Hypertension goal BP (blood pressure) < 140/90 10/16/2018     Priority: Medium    Non-Hodgkin's lymphoma (H) 10/16/2018     Priority: Medium    CKD (chronic kidney disease) stage 3, GFR 30-59 ml/min (H) 10/16/2018     Priority: Medium    Type 2 diabetes mellitus with chronic kidney disease, without long-term current use of insulin, unspecified CKD stage (H)      Priority: Medium      Past Medical History:   Diagnosis Date    Cancer (H) non hodgkins lymphoma    since 2012    Diabetes (H)     Hypertension      Past Surgical History:   Procedure Laterality Date    BIOPSY  January 2023    COLONOSCOPY  2018     Current Outpatient Medications   Medication Sig Dispense Refill    APREPITANT IV       carvedilol (COREG) 3.125 MG tablet Take 1 tablet (3.125 mg) by mouth 2 times daily (with meals) 180 tablet 0    cetirizine (ZYRTEC) 10 MG CHEW Take 10 mg by mouth daily      Coenzyme Q10 (COQ10 PO) Take 300 mg by mouth      DEXAMETHASONE IO       fluticasone (FLONASE) 50 MCG/ACT nasal spray Spray 1 spray into both nostrils daily      gabapentin (NEURONTIN)  "300 MG capsule       losartan (COZAAR) 25 MG tablet Take 1 tablet (25 mg) by mouth every morning 90 tablet 1    metFORMIN (GLUCOPHAGE XR) 500 MG 24 hr tablet Take 1 tablet (500 mg) by mouth daily (with dinner) 180 tablet 0    PALONOSETRON HCL IV       pravastatin (PRAVACHOL) 40 MG tablet Take 1 tablet (40 mg) by mouth at bedtime 90 tablet 2    raxibacumab 50 mg/mL Inject into the vein once      tamsulosin (FLOMAX) 0.4 MG capsule Take 1 capsule (0.4 mg) by mouth daily 90 capsule 1    VENTOLIN  (90 Base) MCG/ACT inhaler INHALE 2 PUFFS BY MOUTH 4 TIMES DAILY 18 g 0       Allergies   Allergen Reactions    Aspirin Other (See Comments)     Salivating and difficulty swallowing    Salivating, felt like throat was going to close Salivating and difficulty swallowing      Salivating and difficulty swallowing    Atorvastatin      Leg cramps    Bees         Social History     Tobacco Use    Smoking status: Never    Smokeless tobacco: Never   Substance Use Topics    Alcohol use: Not Currently     Family History   Problem Relation Age of Onset    Cancer Mother     Other Cancer Mother         Pancreatic    Heart Disease Father     Coronary Artery Disease Father         conjestive HF    Other Cancer Father     Glaucoma No family hx of     Macular Degeneration No family hx of      History   Drug Use Unknown         Review of Systems    Review of Systems  Constitutional, HEENT, cardiovascular, pulmonary, gi and gu systems are negative, except as otherwise noted.    Objective    /72   Pulse 79   Temp 97.1  F (36.2  C) (Tympanic)   Resp 16   Ht 1.676 m (5' 6\")   Wt 68 kg (150 lb)   SpO2 97%   BMI 24.21 kg/m     Estimated body mass index is 24.21 kg/m  as calculated from the following:    Height as of this encounter: 1.676 m (5' 6\").    Weight as of this encounter: 68 kg (150 lb).  Physical Exam  GENERAL: alert and no distress  NECK: no adenopathy, no asymmetry, masses, or scars  RESP: lungs clear to auscultation - no " rales, rhonchi or wheezes  CV: regular rate and rhythm, normal S1 S2, no S3 or S4, no murmur, click or rub, no peripheral edema  ABDOMEN: soft, nontender, no hepatosplenomegaly, no masses and bowel sounds normal  MS: no gross musculoskeletal defects noted, no edema    Recent Labs   Lab Test 12/11/23  0947 04/11/23  1147 09/09/22  0854   HGB 10.4*  --  14.5    136 139   POTASSIUM 3.9 3.9 4.7   CR 1.04 1.35* 1.30*   A1C 5.8* 6.0* 6.3*        Diagnostics  No labs were ordered during this visit.   No EKG this visit, completed in the last 90 days.    Revised Cardiac Risk Index (RCRI)  The patient has the following serious cardiovascular risks for perioperative complications:   - No serious cardiac risks = 0 points     RCRI Interpretation: 0 points: Class I (very low risk - 0.4% complication rate)      Wt Readings from Last 4 Encounters:   04/29/24 68 kg (150 lb)   03/21/24 67.1 kg (148 lb)   12/11/23 66.2 kg (146 lb)   04/11/23 82.6 kg (182 lb)            Signed Electronically by: Eden Ferguson MD  Copy of this evaluation report is provided to requesting physician.

## 2024-04-29 NOTE — PATIENT INSTRUCTIONS
Preparing for Your Surgery  Getting started  A nurse will call you to review your health history and instructions. They will give you an arrival time based on your scheduled surgery time. Please be ready to share:  Your doctor's clinic name and phone number  Your medical, surgical, and anesthesia history  A list of allergies and sensitivities  A list of medicines, including herbal treatments and over-the-counter drugs  Whether the patient has a legal guardian (ask how to send us the papers in advance)  Please tell us if you're pregnant--or if there's any chance you might be pregnant. Some surgeries may injure a fetus (unborn baby), so they require a pregnancy test. Surgeries that are safe for a fetus don't always need a test, and you can choose whether to have one.   If you have a child who's having surgery, please ask for a copy of Preparing for Your Child's Surgery.    Preparing for surgery  Within 10 to 30 days of surgery: Have a pre-op exam (sometimes called an H&P, or History and Physical). This can be done at a clinic or pre-operative center.  If you're having a , you may not need this exam. Talk to your care team.  At your pre-op exam, talk to your care team about all medicines you take. If you need to stop any medicines before surgery, ask when to start taking them again.  We do this for your safety. Many medicines can make you bleed too much during surgery. Some change how well surgery (anesthesia) drugs work.  Call your insurance company to let them know you're having surgery. (If you don't have insurance, call [phone].)  Call your clinic if there's any change in your health. This includes signs of a cold or flu (sore throat, runny nose, cough, rash, fever). It also includes a scrape or scratch near the surgery site.  If you have questions on the day of surgery, call your hospital or surgery center.  Eating and drinking guidelines  For your safety: Unless your surgeon tells you otherwise,  follow the guidelines below.  Eat and drink as usual until 8 hours before you arrive for surgery. After that, no food or milk.  Drink clear liquids until 2 hours before you arrive. These are liquids you can see through, like water, Gatorade, and Propel Water. They also include plain black coffee and tea (no cream or milk), candy, and breath mints. You can spit out gum when you arrive.  If you drink alcohol: Stop drinking it the night before surgery.  If your care team tells you to take medicine on the morning of surgery, it's okay to take it with a sip of water.  Preventing infection  Shower or bathe the night before and morning of your surgery. Follow the instructions your clinic gave you. (If no instructions, use regular soap.)  Don't shave or clip hair near your surgery site. We'll remove the hair if needed.  Don't smoke or vape the morning of surgery. You may chew nicotine gum up to 2 hours before surgery. A nicotine patch is okay.  Note: Some surgeries require you to completely quit smoking and nicotine. Check with your surgeon.  Your care team will make every effort to keep you safe from infection. We will:  Clean our hands often with soap and water (or an alcohol-based hand rub).  Clean the skin at your surgery site with a special soap that kills germs.  Give you a special gown to keep you warm. (Cold raises the risk of infection.)  Wear special hair covers, masks, gowns and gloves during surgery.  Give antibiotic medicine, if prescribed. Not all surgeries need antibiotics.  What to bring on the day of surgery  Photo ID and insurance card  Copy of your health care directive, if you have one  Glasses and hearing aids (bring cases)  You can't wear contacts during surgery  Inhaler and eye drops, if you use them (tell us about these when you arrive)  CPAP machine or breathing device, if you use them  A few personal items, if spending the night  If you have . . .  A pacemaker, ICD (cardiac defibrillator) or other  implant: Bring the ID card.  An implanted stimulator: Bring the remote control.  A legal guardian: Bring a copy of the certified (court-stamped) guardianship papers.  Please remove any jewelry, including body piercings. Leave jewelry and other valuables at home.  If you're going home the day of surgery  You must have a responsible adult drive you home. They should stay with you overnight as well.  If you don't have someone to stay with you, and you aren't safe to go home alone, we may keep you overnight. Insurance often won't pay for this.  After surgery  If it's hard to control your pain or you need more pain medicine, please call your surgeon's office.  Questions?   If you have any questions for your care team, list them here: _________________________________________________________________________________________________________________________________________________________________________ ____________________________________ ____________________________________ ____________________________________  For informational purposes only. Not to replace the advice of your health care provider. Copyright   2003, 2019 [employer]. All rights reserved. Clinically reviewed by Cha Torres MD. [employer] 654270 - REV 12/22.    How to Take Your Medication Before Surgery  - hold Losartan and Metformin on the day of the procedure

## 2024-04-29 NOTE — PROGRESS NOTES
Preop Evaluation faxed to [facility] at [phone], [employer] confirmed.  Ruth MCLEAN    [facility]

## 2024-04-30 LAB
FASTING STATUS PATIENT QL REPORTED: NO
GLUCOSE SERPL-MCNC: 105 MG/DL (ref 70–99)

## 2024-06-06 ENCOUNTER — TRANSFERRED RECORDS (OUTPATIENT)
Dept: MULTI SPECIALTY CLINIC | Facility: CLINIC | Age: 74
End: 2024-06-06

## 2024-06-06 LAB — RETINOPATHY: NORMAL

## 2024-06-23 DIAGNOSIS — I10 HYPERTENSION GOAL BP (BLOOD PRESSURE) < 140/90: ICD-10-CM

## 2024-06-23 DIAGNOSIS — N40.1 BENIGN PROSTATIC HYPERPLASIA WITH URINARY FREQUENCY: ICD-10-CM

## 2024-06-23 DIAGNOSIS — R35.0 BENIGN PROSTATIC HYPERPLASIA WITH URINARY FREQUENCY: ICD-10-CM

## 2024-06-24 RX ORDER — TAMSULOSIN HYDROCHLORIDE 0.4 MG/1
0.4 CAPSULE ORAL DAILY
Qty: 90 CAPSULE | Refills: 0 | Status: SHIPPED | OUTPATIENT
Start: 2024-06-24

## 2024-06-24 RX ORDER — CARVEDILOL 3.12 MG/1
3.12 TABLET ORAL 2 TIMES DAILY WITH MEALS
Qty: 180 TABLET | Refills: 1 | Status: SHIPPED | OUTPATIENT
Start: 2024-06-24

## 2024-06-28 ENCOUNTER — TRANSFERRED RECORDS (OUTPATIENT)
Dept: HEALTH INFORMATION MANAGEMENT | Facility: CLINIC | Age: 74
End: 2024-06-28
Payer: COMMERCIAL

## 2024-07-02 DIAGNOSIS — E11.22 TYPE 2 DIABETES MELLITUS WITH CHRONIC KIDNEY DISEASE, WITHOUT LONG-TERM CURRENT USE OF INSULIN, UNSPECIFIED CKD STAGE (H): ICD-10-CM

## 2024-07-03 NOTE — TELEPHONE ENCOUNTER
Routing to RN team:    please confirm the reason for the gap since the last time this medication was refilled.  After getting information route the message to patients pcp.   Sonja MARCUSN, RN

## 2024-07-09 NOTE — TELEPHONE ENCOUNTER
Left message on answering machine for patient to call back to [phone].  Sonja Avila BSN, RN

## 2024-07-10 RX ORDER — METFORMIN HCL 500 MG
TABLET, EXTENDED RELEASE 24 HR ORAL
Qty: 180 TABLET | Refills: 1 | OUTPATIENT
Start: 2024-07-10

## 2024-07-10 NOTE — TELEPHONE ENCOUNTER
Pt states he has been on  metformin continuously. He has been fillng it at [facility]. He states do not send refill to [facility].  Sonja MARCUSN, RN

## 2024-08-14 ENCOUNTER — TRANSFERRED RECORDS (OUTPATIENT)
Dept: HEALTH INFORMATION MANAGEMENT | Facility: CLINIC | Age: 74
End: 2024-08-14
Payer: COMMERCIAL

## 2024-09-09 DIAGNOSIS — I10 HYPERTENSION GOAL BP (BLOOD PRESSURE) < 140/90: ICD-10-CM

## 2024-09-10 RX ORDER — LOSARTAN POTASSIUM 25 MG/1
25 TABLET ORAL EVERY MORNING
Qty: 90 TABLET | Refills: 0 | Status: SHIPPED | OUTPATIENT
Start: 2024-09-10

## 2024-10-02 ENCOUNTER — TRANSFERRED RECORDS (OUTPATIENT)
Dept: HEALTH INFORMATION MANAGEMENT | Facility: CLINIC | Age: 74
End: 2024-10-02
Payer: COMMERCIAL

## 2024-10-14 ENCOUNTER — MYC REFILL (OUTPATIENT)
Dept: FAMILY MEDICINE | Facility: CLINIC | Age: 74
End: 2024-10-14
Payer: COMMERCIAL

## 2024-10-14 DIAGNOSIS — E11.22 TYPE 2 DIABETES MELLITUS WITH CHRONIC KIDNEY DISEASE, WITHOUT LONG-TERM CURRENT USE OF INSULIN, UNSPECIFIED CKD STAGE (H): ICD-10-CM

## 2024-10-15 ENCOUNTER — TELEPHONE (OUTPATIENT)
Dept: FAMILY MEDICINE | Facility: CLINIC | Age: 74
End: 2024-10-15
Payer: COMMERCIAL

## 2024-10-15 DIAGNOSIS — E11.22 TYPE 2 DIABETES MELLITUS WITH CHRONIC KIDNEY DISEASE, WITHOUT LONG-TERM CURRENT USE OF INSULIN, UNSPECIFIED CKD STAGE (H): ICD-10-CM

## 2024-10-15 RX ORDER — METFORMIN HYDROCHLORIDE 500 MG/1
500 TABLET, EXTENDED RELEASE ORAL
Qty: 180 TABLET | Refills: 0 | Status: SHIPPED | OUTPATIENT
Start: 2024-10-15 | End: 2024-10-18

## 2024-10-15 NOTE — TELEPHONE ENCOUNTER
Please review prescription sent for metformin.  It states one daily but dispense 180.  Should it be twice daily or dispense 90.please resend with clarification.  Sonja MARCUSN, RN

## 2024-10-15 NOTE — TELEPHONE ENCOUNTER
Clinic RN: Please investigate patient's chart or contact patient if the information cannot be found because patient should have run out of this medication on 1/24/24. Confirm patient is taking this medication as prescribed. Document findings and route refill encounter to provider for approval or denial.     Wilber Guaadlupe, RN, BSN, MSN  RN Lead

## 2024-10-16 NOTE — TELEPHONE ENCOUNTER
[facility] calling again, to check on clarification of patient metFORMIN (GLUCOPHAGE XR) 500 MG 24 hr tablet. 180 tabs dispensed, but sig states to take one tab daily.    Provider: is patient supposed to take one or two tabs daily? Please clarify. Thanks,    Radha BROWNING  [facility]

## 2024-10-18 RX ORDER — METFORMIN HYDROCHLORIDE 500 MG/1
500 TABLET, EXTENDED RELEASE ORAL
Qty: 90 TABLET | Refills: 3 | Status: SHIPPED | OUTPATIENT
Start: 2024-10-18

## 2024-10-31 SDOH — HEALTH STABILITY: PHYSICAL HEALTH: ON AVERAGE, HOW MANY DAYS PER WEEK DO YOU ENGAGE IN MODERATE TO STRENUOUS EXERCISE (LIKE A BRISK WALK)?: 7 DAYS

## 2024-10-31 SDOH — HEALTH STABILITY: PHYSICAL HEALTH: ON AVERAGE, HOW MANY MINUTES DO YOU ENGAGE IN EXERCISE AT THIS LEVEL?: 30 MIN

## 2024-11-04 ENCOUNTER — OFFICE VISIT (OUTPATIENT)
Dept: FAMILY MEDICINE | Facility: CLINIC | Age: 74
End: 2024-11-04
Payer: COMMERCIAL

## 2024-11-04 VITALS
OXYGEN SATURATION: 97 % | DIASTOLIC BLOOD PRESSURE: 80 MMHG | RESPIRATION RATE: 16 BRPM | BODY MASS INDEX: 25.9 KG/M2 | HEART RATE: 88 BPM | WEIGHT: 165 LBS | HEIGHT: 67 IN | TEMPERATURE: 97.2 F | SYSTOLIC BLOOD PRESSURE: 135 MMHG

## 2024-11-04 DIAGNOSIS — E11.22 TYPE 2 DIABETES MELLITUS WITH CHRONIC KIDNEY DISEASE, WITHOUT LONG-TERM CURRENT USE OF INSULIN, UNSPECIFIED CKD STAGE (H): ICD-10-CM

## 2024-11-04 DIAGNOSIS — Z12.83 SKIN EXAM, SCREENING FOR CANCER: ICD-10-CM

## 2024-11-04 DIAGNOSIS — Z12.5 SCREENING FOR PROSTATE CANCER: ICD-10-CM

## 2024-11-04 DIAGNOSIS — Z00.00 ENCOUNTER FOR MEDICARE ANNUAL WELLNESS EXAM: Primary | ICD-10-CM

## 2024-11-04 DIAGNOSIS — N18.30 STAGE 3 CHRONIC KIDNEY DISEASE, UNSPECIFIED WHETHER STAGE 3A OR 3B CKD (H): ICD-10-CM

## 2024-11-04 LAB
ALBUMIN SERPL BCG-MCNC: 4.3 G/DL (ref 3.5–5.2)
ALP SERPL-CCNC: 75 U/L (ref 40–150)
ALT SERPL W P-5'-P-CCNC: 13 U/L (ref 0–70)
ANION GAP SERPL CALCULATED.3IONS-SCNC: 11 MMOL/L (ref 7–15)
AST SERPL W P-5'-P-CCNC: 22 U/L (ref 0–45)
BASOPHILS # BLD AUTO: 0.1 10E3/UL (ref 0–0.2)
BASOPHILS NFR BLD AUTO: 1 %
BILIRUB SERPL-MCNC: 0.4 MG/DL
BUN SERPL-MCNC: 20.9 MG/DL (ref 8–23)
CALCIUM SERPL-MCNC: 9.7 MG/DL (ref 8.8–10.4)
CHLORIDE SERPL-SCNC: 101 MMOL/L (ref 98–107)
CHOLEST SERPL-MCNC: 167 MG/DL
CREAT SERPL-MCNC: 1.42 MG/DL (ref 0.67–1.17)
EGFRCR SERPLBLD CKD-EPI 2021: 53 ML/MIN/1.73M2
EOSINOPHIL # BLD AUTO: 0.5 10E3/UL (ref 0–0.7)
EOSINOPHIL NFR BLD AUTO: 7 %
ERYTHROCYTE [DISTWIDTH] IN BLOOD BY AUTOMATED COUNT: 13.8 % (ref 10–15)
EST. AVERAGE GLUCOSE BLD GHB EST-MCNC: 128 MG/DL
FASTING STATUS PATIENT QL REPORTED: YES
FASTING STATUS PATIENT QL REPORTED: YES
GLUCOSE SERPL-MCNC: 93 MG/DL (ref 70–99)
HBA1C MFR BLD: 6.1 % (ref 0–5.6)
HCO3 SERPL-SCNC: 27 MMOL/L (ref 22–29)
HCT VFR BLD AUTO: 39.4 % (ref 40–53)
HDLC SERPL-MCNC: 41 MG/DL
HGB BLD-MCNC: 13 G/DL (ref 13.3–17.7)
IMM GRANULOCYTES # BLD: 0 10E3/UL
IMM GRANULOCYTES NFR BLD: 0 %
LDLC SERPL CALC-MCNC: 87 MG/DL
LYMPHOCYTES # BLD AUTO: 0.7 10E3/UL (ref 0.8–5.3)
LYMPHOCYTES NFR BLD AUTO: 9 %
MCH RBC QN AUTO: 29.1 PG (ref 26.5–33)
MCHC RBC AUTO-ENTMCNC: 33 G/DL (ref 31.5–36.5)
MCV RBC AUTO: 88 FL (ref 78–100)
MONOCYTES # BLD AUTO: 1.4 10E3/UL (ref 0–1.3)
MONOCYTES NFR BLD AUTO: 20 %
NEUTROPHILS # BLD AUTO: 4.5 10E3/UL (ref 1.6–8.3)
NEUTROPHILS NFR BLD AUTO: 63 %
NONHDLC SERPL-MCNC: 126 MG/DL
PLATELET # BLD AUTO: 270 10E3/UL (ref 150–450)
POTASSIUM SERPL-SCNC: 4.3 MMOL/L (ref 3.4–5.3)
PROT SERPL-MCNC: 6.9 G/DL (ref 6.4–8.3)
PSA SERPL DL<=0.01 NG/ML-MCNC: 4.66 NG/ML (ref 0–6.5)
RBC # BLD AUTO: 4.47 10E6/UL (ref 4.4–5.9)
SODIUM SERPL-SCNC: 139 MMOL/L (ref 135–145)
TRIGL SERPL-MCNC: 195 MG/DL
WBC # BLD AUTO: 7.1 10E3/UL (ref 4–11)

## 2024-11-04 PROCEDURE — 36415 COLL VENOUS BLD VENIPUNCTURE: CPT | Performed by: FAMILY MEDICINE

## 2024-11-04 PROCEDURE — 82043 UR ALBUMIN QUANTITATIVE: CPT | Performed by: FAMILY MEDICINE

## 2024-11-04 PROCEDURE — G0439 PPPS, SUBSEQ VISIT: HCPCS | Performed by: FAMILY MEDICINE

## 2024-11-04 PROCEDURE — 80061 LIPID PANEL: CPT | Performed by: FAMILY MEDICINE

## 2024-11-04 PROCEDURE — 80053 COMPREHEN METABOLIC PANEL: CPT | Performed by: FAMILY MEDICINE

## 2024-11-04 PROCEDURE — 99213 OFFICE O/P EST LOW 20 MIN: CPT | Mod: 25 | Performed by: FAMILY MEDICINE

## 2024-11-04 PROCEDURE — 85025 COMPLETE CBC W/AUTO DIFF WBC: CPT | Performed by: FAMILY MEDICINE

## 2024-11-04 PROCEDURE — G0103 PSA SCREENING: HCPCS | Performed by: FAMILY MEDICINE

## 2024-11-04 PROCEDURE — 83036 HEMOGLOBIN GLYCOSYLATED A1C: CPT | Performed by: FAMILY MEDICINE

## 2024-11-04 PROCEDURE — 82570 ASSAY OF URINE CREATININE: CPT | Performed by: FAMILY MEDICINE

## 2024-11-04 ASSESSMENT — PAIN SCALES - GENERAL: PAINLEVEL_OUTOF10: NO PAIN (0)

## 2024-11-04 NOTE — PROGRESS NOTES
"Preventive Care Visit  [facility]  Eden Ferguson MD, Family Medicine  Nov 4, 2024      Assessment & Plan     Encounter for Medicare annual wellness exam  Preventive care reviewed and updated.    - Lipid panel reflex to direct LDL Fasting; Future  - CBC with Platelets & Differential; Future  - Comprehensive metabolic panel; Future  - Lipid panel reflex to direct LDL Fasting  - CBC with Platelets & Differential  - Comprehensive metabolic panel    Type 2 diabetes mellitus with chronic kidney disease, without long-term current use of insulin, unspecified CKD stage (H)  Obesity is well-controlled.  Currently on metformin 500 mg daily, doing well denies any side effects from the medication.  Continue the same treatment    Lab Results   Component Value Date    A1C 6.1 11/04/2024    A1C 5.8 04/29/2024    A1C 5.8 12/11/2023    A1C 6.0 04/11/2023    A1C 6.3 09/09/2022    A1C 6.3 08/03/2020    A1C 6.1 10/25/2019    A1C 6.2 04/12/2019    A1C 5.9 12/03/2018       - Albumin Random Urine Quantitative with Creat Ratio; Future  - Hemoglobin A1c; Future  - Albumin Random Urine Quantitative with Creat Ratio  - Hemoglobin A1c    Stage 3 chronic kidney disease, unspecified whether stage 3a or 3b CKD (H)  Stable based on most recent BMP.  Avoid nephrotoxins including NSAIDs, continue to monitor  Screening for prostate cancer    - Prostate Specific Antigen Screen; Future  - Prostate Specific Antigen Screen    Skin exam, screening for cancer    - Adult Dermatology  Referral; Future            BMI  Estimated body mass index is 26.04 kg/m  as calculated from the following:    Height as of this encounter: 1.695 m (5' 6.75\").    Weight as of this encounter: 74.8 kg (165 lb).       Counseling  Appropriate preventive services were addressed with this patient via screening, questionnaire, or discussion as appropriate for fall prevention, nutrition, physical activity, Tobacco-use cessation, social engagement, " weight loss and cognition.  Checklist reviewing preventive services available has been given to the patient.  Reviewed patient's diet, addressing concerns and/or questions.   He is at risk for psychosocial distress and has been provided with information to reduce risk.   The patient was provided with written information regarding signs of hearing loss.       Regular exercise    Subjective   JUDE is a 73 year old, presenting for the following:  Wellness Visit        11/4/2024    10:10 AM   Additional Questions   Roomed by Deb        Via the Health Maintenance questionnaire, the patient has reported the following services have been completed -Eye Exam: [facility] 2024-06-06, this information has been sent to the abstraction team.    HPI          Preventive     Immunization History   Administered Date(s) Administered    COVID-19 12+ (MODERNA) 03/04/2024    COVID-19 12+ (Pfizer) 08/29/2024    COVID-19 MONOVALENT 12+ (Pfizer) 03/09/2021, 03/29/2021, 08/14/2021    COVID-19 Monovalent 12+ (Pfizer 2022) 04/08/2022    Flu 65+ (Fluad) 09/06/2024    Flu, Unspecified 11/21/2001, 11/13/2002, 11/01/2003    Hepatitis B, Adult 11/05/2012, 04/08/2019, 10/25/2019    Influenza (H1N1) 12/16/2009    Influenza (High Dose) Trivalent,PF (Fluzone) 10/24/2018, 10/25/2019, 09/10/2020, 09/08/2021    Influenza (IIV3) PF 09/01/2009, 10/05/2010, 10/03/2011, 11/05/2012    Influenza Vaccine 65+ (Fluzone HD) 09/08/2021, 09/09/2022, 12/11/2023    Influenza Vaccine >6 months,quad, PF 09/09/2013, 10/27/2015    Influenza Vaccine, 6+MO IM (QUADRIVALENT W/PRESERVATIVES) 10/13/2017    Pneumo Conj 13-V (2010&after) 10/25/2017    Pneumococcal 23 valent 09/01/2009, 12/03/2018    RSV Vaccine (Arexvy) 02/02/2024    TD,PF 7+ (Tenivac) 04/08/2019    TDAP Vaccine (Adacel) 07/18/2008    Td (Adult), Adsorbed 06/18/2004    Zoster recombinant adjuvanted (SHINGRIX) 07/27/2020, 09/19/2020    Zoster vaccine, live 09/10/2012       - Colon CA screen: Colonoscopy, age  45-75 every 10 years or FIT every year or Cologuard every 3 years   Scheduled 11/2024     - Prostate CA screen: Discussed controversy about screening.   PSA   Date Value Ref Range Status   08/03/2020 2.07 0 - 4 ug/L Final     Comment:     Assay Method:  Chemiluminescence using Siemens Vista analyzer     Prostate Specific Antigen Screen   Date Value Ref Range Status   12/11/2023 2.98 0.00 - 6.50 ng/mL Final   09/09/2022 3.19 0.00 - 4.00 ug/L Final       -lipids screen: ordered     Diabetes screen: ordered     Wt Readings from Last 4 Encounters:   11/04/24 74.8 kg (165 lb)   04/29/24 68 kg (150 lb)   03/21/24 67.1 kg (148 lb)   12/11/23 66.2 kg (146 lb)                   Health Care Directive  Patient does not have a Health Care Directive: Discussed advance care planning with patient; however, patient declined at this time.      10/31/2024   General Health   How would you rate your overall physical health? Good   Feel stress (tense, anxious, or unable to sleep) Only a little      (!) STRESS CONCERN      10/31/2024   Nutrition   Diet: Low salt            10/31/2024   Exercise   Days per week of moderate/strenous exercise 7 days   Average minutes spent exercising at this level 30 min            10/31/2024   Social Factors   Worry food won't last until get money to buy more No   Food not last or not have enough money for food? No   Do you have housing? (Housing is defined as stable permanent housing and does not include staying ouside in a car, in a tent, in an abandoned building, in an overnight shelter, or couch-surfing.) Yes   Are you worried about losing your housing? No   Lack of transportation? No   Unable to get utilities (heat,electricity)? No            11/4/2024   Fall Risk   Gait Speed Test (Document in seconds) 4.75   Gait Speed Test Interpretation Less than or equal to 5.00 seconds - PASS             10/31/2024   Activities of Daily Living- Home Safety   Needs help with the following daily activites None of  the above   Safety concerns in the home None of the above            10/31/2024   Dental   Dentist two times every year? Yes            10/31/2024   Hearing Screening   Hearing concerns? (!) TROUBLE UNDESTANDING A SPEAKER IN A PUBLIC MEETING OR [employer] SERVICE.            10/31/2024   Driving Risk Screening   Patient/family members have concerns about driving No            10/31/2024   General Alertness/Fatigue Screening   Have you been more tired than usual lately? No            10/31/2024   Urinary Incontinence Screening   Bothered by leaking urine in past 6 months No            10/31/2024   TB Screening   Were you born outside of the [place]? No            Today's PHQ-2 Score:       11/4/2024    10:02 AM   PHQ-2 ( 1999 Pfizer)   Q1: Little interest or pleasure in doing things 0    Q2: Feeling down, depressed or hopeless 0    PHQ-2 Score 0    Q1: Little interest or pleasure in doing things Not at all   Q2: Feeling down, depressed or hopeless Not at all   PHQ-2 Score 0       Patient-reported           10/31/2024   Substance Use   Alcohol more than 3/day or more than 7/wk No   Do you have a current opioid prescription? No   How severe/bad is pain from 1 to 10? 2/10   Do you use any other substances recreationally? No        Social History     Tobacco Use    Smoking status: Never    Smokeless tobacco: Never   Vaping Use    Vaping status: Never Used   Substance Use Topics    Alcohol use: Not Currently    Drug use: Never           10/31/2024   AAA Screening   Family history of Abdominal Aortic Aneurysm (AAA)? No      ASCVD Risk   The 10-year ASCVD risk score (Linh RAMIREZ, et al., 2019) is: 46%    Values used to calculate the score:      Age: 73 years      Sex: Male      Is Non- : No      Diabetic: Yes      Tobacco smoker: No      Systolic Blood Pressure: 135 mmHg      Is BP treated: Yes      HDL Cholesterol: 32 mg/dL      Total Cholesterol: 154 mg/dL            Reviewed and updated as needed  this visit by Provider     Meds                Past Medical History:   Diagnosis Date    Cancer (H) non hodgkins lymphoma    since 2012    Diabetes (H)     Hypertension      Past Surgical History:   Procedure Laterality Date    BIOPSY  January 2023    COLONOSCOPY  2018     Lab work is in process  BP Readings from Last 3 Encounters:   11/04/24 135/80   04/29/24 130/72   04/04/24 120/77    Wt Readings from Last 3 Encounters:   11/04/24 74.8 kg (165 lb)   04/29/24 68 kg (150 lb)   03/21/24 67.1 kg (148 lb)                  Patient Active Problem List   Diagnosis    Type 2 diabetes mellitus with chronic kidney disease, without long-term current use of insulin, unspecified CKD stage (H)    Hypertension goal BP (blood pressure) < 140/90    Non-Hodgkin's lymphoma (H)    CKD (chronic kidney disease) stage 3, GFR 30-59 ml/min (H)    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, unspecified non-follicular lymphoma type (H)    Other male erectile dysfunction     Past Surgical History:   Procedure Laterality Date    BIOPSY  January 2023    COLONOSCOPY  2018       Social History     Tobacco Use    Smoking status: Never    Smokeless tobacco: Never   Substance Use Topics    Alcohol use: Not Currently     Family History   Problem Relation Age of Onset    Cancer Mother     Other Cancer Mother         Pancreatic    Heart Disease Father     Coronary Artery Disease Father         conjestive HF    Other Cancer Father     Glaucoma No family hx of     Macular Degeneration No family hx of          Current Outpatient Medications   Medication Sig Dispense Refill    carvedilol (COREG) 3.125 MG tablet TAKE 1 TABLET BY MOUTH TWICE DAILY WITH MEALS 180 tablet 1    cetirizine (ZYRTEC) 10 MG CHEW Take 10 mg by mouth daily      DEXAMETHASONE IO       fluticasone (FLONASE) 50 MCG/ACT nasal spray Spray 1 spray into both nostrils daily      losartan (COZAAR) 25 MG tablet Take 1 tablet (25 mg) by mouth every morning. 90 tablet 0     metFORMIN (GLUCOPHAGE XR) 500 MG 24 hr tablet Take 1 tablet (500 mg) by mouth daily (with dinner). 90 tablet 3    pravastatin (PRAVACHOL) 40 MG tablet Take 1 tablet (40 mg) by mouth at bedtime 90 tablet 2    raxibacumab 50 mg/mL Inject into the vein once      tamsulosin (FLOMAX) 0.4 MG capsule Take 1 capsule by mouth once daily 90 capsule 0    VENTOLIN  (90 Base) MCG/ACT inhaler INHALE 2 PUFFS BY MOUTH 4 TIMES DAILY 18 g 0     Allergies   Allergen Reactions    Aspirin Other (See Comments)     Salivating and difficulty swallowing    Salivating, felt like throat was going to close Salivating and difficulty swallowing      Salivating and difficulty swallowing    Atorvastatin      Leg cramps    Bees      Recent Labs   Lab Test 11/04/24  1045 04/29/24  1439 12/11/23  0947 04/11/23  1147 09/09/22  0854 10/25/21  1123 10/25/21  1123 08/03/20  0957 10/25/19  1155 04/12/19  0839 12/03/18  0951   A1C 6.1* 5.8* 5.8* 6.0* 6.3*   < > 6.0* 6.3* 6.1*   < > 5.9*   LDL  --   --  99 61  --   --  101* 114* 116*   < > 138*   HDL  --   --  32* 76  --   --  41 40 44   < > 42   TRIG  --   --  113 118  --   --  136 153* 155*   < > 210*   ALT  --   --  15  --  34  --  22  --  24  --  31   CR  --   --  1.04 1.35* 1.30*   < > 1.45*  --  1.30*  --  1.31*   GFRESTIMATED  --   --  77 56* 59*   < > 49*  --  56*  --  55*   GFRESTBLACK  --   --   --   --   --   --   --   --  65  --  66   POTASSIUM  --   --  3.9 3.9 4.7   < > 4.2  --  4.3  --  4.1   TSH  --   --   --   --   --   --  1.07 1.25  --   --  1.69    < > = values in this interval not displayed.      Current providers sharing in care for this patient include:  Patient Care Team:  Eden Ferguson MD as PCP - General (Family Medicine)  Nelia Blackburn MD as MD (Hematology & Oncology)  Adwoa Martin APRN CNP as Nurse Practitioner (Dermatology)  Eden Ferguson MD as Assigned PCP  Janet Jung OD as Assigned Surgical Provider    The following health maintenance items are reviewed  "in Epic and correct as of today:  Health Maintenance   Topic Date Due    DIABETIC FOOT EXAM  10/25/2022    ANNUAL REVIEW OF HM ORDERS  09/09/2023    COLORECTAL CANCER SCREENING  10/12/2023    MICROALBUMIN  04/11/2024    EYE EXAM  06/22/2024    COVID-19 Vaccine (7 - 2024-25 season) 10/24/2024    BMP  12/11/2024    LIPID  12/11/2024    A1C  05/04/2025    MEDICARE ANNUAL WELLNESS VISIT  11/04/2025    FALL RISK ASSESSMENT  11/04/2025    HEMOGLOBIN  11/04/2025    DTAP/TDAP/TD IMMUNIZATION (3 - Td or Tdap) 04/08/2029    ADVANCE CARE PLANNING  04/29/2029    HEPATITIS C SCREENING  Completed    PHQ-2 (once per calendar year)  Completed    INFLUENZA VACCINE  Completed    Pneumococcal Vaccine: 65+ Years  Completed    URINALYSIS  Completed    ZOSTER IMMUNIZATION  Completed    RSV VACCINE  Completed    HPV IMMUNIZATION  Aged Out    MENINGITIS IMMUNIZATION  Aged Out    RSV MONOCLONAL ANTIBODY  Aged Out         Review of Systems  Constitutional, HEENT, cardiovascular, pulmonary, gi and gu systems are negative, except as otherwise noted.     Objective    Exam  /80   Pulse 88   Temp 97.2  F (36.2  C) (Tympanic)   Resp 16   Ht 1.695 m (5' 6.75\")   Wt 74.8 kg (165 lb)   SpO2 97%   BMI 26.04 kg/m     Estimated body mass index is 26.04 kg/m  as calculated from the following:    Height as of this encounter: 1.695 m (5' 6.75\").    Weight as of this encounter: 74.8 kg (165 lb).    Physical Exam  GENERAL: alert and no distress  NECK: no adenopathy, no asymmetry, masses, or scars  RESP: lungs clear to auscultation - no rales, rhonchi or wheezes  CV: regular rate and rhythm, normal S1 S2, no S3 or S4, no murmur, click or rub, no peripheral edema  ABDOMEN: soft, nontender, no hepatosplenomegaly, no masses and bowel sounds normal  MS: no gross musculoskeletal defects noted, no edema         11/4/2024   Mini Cog   Clock Draw Score 2 Normal   3 Item Recall 2 objects recalled   Mini Cog Total Score 4            Vision Screen   "       Signed Electronically by: Eden Ferguson MD

## 2024-11-04 NOTE — PATIENT INSTRUCTIONS
Patient Education   Preventive Care Advice   This is general advice given by our system to help you stay healthy. However, your care team may have specific advice just for you. Please talk to your care team about your preventive care needs.  Nutrition  Eat 5 or more servings of fruits and vegetables each day.  Try wheat bread, brown rice and whole grain pasta (instead of white bread, rice, and pasta).  Get enough calcium and vitamin D. Check the label on foods and aim for 100% of the RDA (recommended daily allowance).  Lifestyle  Exercise at least 150 minutes each week  (30 minutes a day, 5 days a week).  Do muscle strengthening activities 2 days a week. These help control your weight and prevent disease.  No smoking.  Wear sunscreen to prevent skin cancer.  Have a dental exam and cleaning every 6 months.  Yearly exams  See your health care team every year to talk about:  Any changes in your health.  Any medicines your care team has prescribed.  Preventive care, family planning, and ways to prevent chronic diseases.  Shots (vaccines)   HPV shots (up to age 26), if you've never had them before.  Hepatitis B shots (up to age 59), if you've never had them before.  COVID-19 shot: Get this shot when it's due.  Flu shot: Get a flu shot every year.  Tetanus shot: Get a tetanus shot every 10 years.  Pneumococcal, hepatitis A, and RSV shots: Ask your care team if you need these based on your risk.  Shingles shot (for age 50 and up)  General health tests  Diabetes screening:  Starting at age 35, Get screened for diabetes at least every 3 years.  If you are younger than age 35, ask your care team if you should be screened for diabetes.  Cholesterol test: At age 39, start having a cholesterol test every 5 years, or more often if advised.  Bone density scan (DEXA): At age 50, ask your care team if you should have this scan for osteoporosis (brittle bones).  Hepatitis C: Get tested at least once in your life.  STIs (sexually  transmitted infections)  Before age 24: Ask your care team if you should be screened for STIs.  After age 24: Get screened for STIs if you're at risk. You are at risk for STIs (including HIV) if:  You are sexually active with more than one person.  You don't use condoms every time.  You or a partner was diagnosed with a sexually transmitted infection.  If you are at risk for HIV, ask about PrEP medicine to prevent HIV.  Get tested for HIV at least once in your life, whether you are at risk for HIV or not.  Cancer screening tests  Cervical cancer screening: If you have a cervix, begin getting regular cervical cancer screening tests starting at age 21.  Breast cancer scan (mammogram): If you've ever had breasts, begin having regular mammograms starting at age 40. This is a scan to check for breast cancer.  Colon cancer screening: It is important to start screening for colon cancer at age 45.  Have a colonoscopy test every 10 years (or more often if you're at risk) Or, ask your provider about stool tests like a FIT test every year or Cologuard test every 3 years.  To learn more about your testing options, visit:   .  For help making a decision, visit:   https://bit.ly/fo11124.  Prostate cancer screening test: If you have a prostate, ask your care team if a prostate cancer screening test (PSA) at age 55 is right for you.  Lung cancer screening: If you are a current or former smoker ages 50 to 80, ask your care team if ongoing lung cancer screenings are right for you.  For informational purposes only. Not to replace the advice of your health care provider. Copyright   2023 [facility]. All rights reserved. Clinically reviewed by the [facility]. [employer] [id number] - REV 01/24.  Preventing Falls: Care Instructions  Injuries and health problems such as trouble walking or poor eyesight can increase your risk of falling. So can some medicines. But there are things you can do to help  "prevent falls. You can exercise to get stronger. You can also arrange your home to make it safer.    Talk to your doctor about the medicines you take. Ask if any of them increase the risk of falls and whether they can be changed or stopped.   Try to exercise regularly. It can help improve your strength and balance. This can help lower your risk of falling.         Practice fall safety and prevention.   Wear low-heeled shoes that fit well and give your feet good support. Talk to your doctor if you have foot problems that make this hard.  Carry a cellphone or wear a medical alert device that you can use to call for help.  Use stepladders instead of chairs to reach high objects. Don't climb if you're at risk for falls. Ask for help, if needed.  Wear the correct eyeglasses, if you need them.        Make your home safer.   Remove rugs, cords, clutter, and furniture from walkways.  Keep your house well lit. Use night-lights in hallways and bathrooms.  Install and use sturdy handrails on stairways.  Wear nonskid footwear, even inside. Don't walk barefoot or in socks without shoes.        Be safe outside.   Use handrails, curb cuts, and ramps whenever possible.  Keep your hands free by using a shoulder bag or backpack.  Try to walk in well-lit areas. Watch out for uneven ground, changes in pavement, and debris.  Be careful in the winter. Walk on the grass or gravel when sidewalks are slippery. Use de-icer on steps and walkways. Add non-slip devices to shoes.    Put grab bars and nonskid mats in your shower or tub and near the toilet. Try to use a shower chair or bath bench when bathing.   Get into a tub or shower by putting in your weaker leg first. Get out with your strong side first. Have a phone or medical alert device in the bathroom with you.   Where can you learn more?  Go to https://www.[employer].comwise.net/patiented  Enter G117 in the search box to learn more about \"Preventing Falls: Care Instructions.\"  Current as of: " July 17, 2023  Content Version: 14.2 2024 [employer].   Care instructions adapted under license by your healthcare professional. If you have questions about a medical condition or this instruction, always ask your healthcare professional. [employer] disclaims any warranty or liability for your use of this information.    Hearing Loss: Care Instructions  Overview     Hearing loss is a sudden or slow decrease in how well you hear. It can range from slight to profound. Permanent hearing loss can occur with aging. It also can happen when you are exposed long-term to loud noise. Examples include listening to loud music, riding motorcycles, or being around other loud machines.  Hearing loss can affect your work and home life. It can make you feel lonely or depressed. You may feel that you have lost your independence. But hearing aids and other devices can help you hear better and feel connected to others.  Follow-up care is a key part of your treatment and safety. Be sure to make and go to all appointments, and call your doctor if you are having problems. It's also a good idea to know your test results and keep a list of the medicines you take.  How can you care for yourself at home?  Avoid loud noises whenever possible. This helps keep your hearing from getting worse.  Always wear hearing protection around loud noises.  Wear a hearing aid as directed.  A professional can help you pick a hearing aid that will work best for you.  You can also get hearing aids over the counter for mild to moderate hearing loss.  Have hearing tests as your doctor suggests. They can show whether your hearing has changed. Your hearing aid may need to be adjusted.  Use other devices as needed. These may include:  Telephone amplifiers and hearing aids that can connect to a television, stereo, radio, or microphone.  Devices that use lights or vibrations. These alert you to the doorbell, a ringing telephone, or a baby  "monitor.  Television closed-captioning. This shows the words at the bottom of the screen. Most new TVs can do this.  TTY (text telephone). This lets you type messages back and forth on the telephone instead of talking or listening. These devices are also called TDD. When messages are typed on the keyboard, they are sent over the phone line to a receiving TTY. The message is shown on a monitor.  Use text messaging, social media, and email if it is hard for you to communicate by telephone.  Try to learn a listening technique called speechreading. It is not lipreading. You pay attention to people's gestures, expressions, posture, and tone of voice. These clues can help you understand what a person is saying. Face the person you are talking to, and have them face you. Make sure the lighting is good. You need to see the other person's face clearly.  Think about counseling if you need help to adjust to your hearing loss.  When should you call for help?  Watch closely for changes in your health, and be sure to contact your doctor if:    You think your hearing is getting worse.     You have new symptoms, such as dizziness or nausea.   Where can you learn more?  Go to https://www.[employer].net/patiented  Enter R798 in the search box to learn more about \"Hearing Loss: Care Instructions.\"  Current as of: September 27, 2023  Content Version: 14.2 2024 [facility] [employer].   Care instructions adapted under license by your healthcare professional. If you have questions about a medical condition or this instruction, always ask your healthcare professional. [employer] disclaims any warranty or liability for your use of this information.       "

## 2024-11-05 LAB
CREAT UR-MCNC: 107 MG/DL
MICROALBUMIN UR-MCNC: <12 MG/L
MICROALBUMIN/CREAT UR: NORMAL MG/G{CREAT}

## 2024-11-25 ENCOUNTER — PATIENT OUTREACH (OUTPATIENT)
Dept: FAMILY MEDICINE | Facility: CLINIC | Age: 74
End: 2024-11-25
Payer: COMMERCIAL

## 2024-11-25 NOTE — TELEPHONE ENCOUNTER
Patient Quality Outreach    Patient is due for the following:   Colon Cancer Screening    Action(s) Taken:   Call & Schedule Colonoscopy     Type of outreach:    Sent letter.    Questions for provider review:               Roshni Fine, CMA

## 2024-11-25 NOTE — PROGRESS NOTES
{PROVIDER CHARTING PREFERENCE:668394}    Subjective   César is a 73 year old, presenting for the following health issues:  Hospital F/U  {(!) Visit Details have not yet been documented.  Please enter Visit Details and then use this list to pull in documentation. (Optional):496319}  HPI     {MA/LPN/RN Pre-Provider Visit Orders- hCG/UA/Strep (Optional):820849}  {SUPERLIST (Optional):080413}  {additonal problems for provider to add (Optional):562184}    {ROS Picklists (Optional):539704}      Objective    There were no vitals taken for this visit.  There is no height or weight on file to calculate BMI.  Physical Exam   {Exam List (Optional):479236}    {Diagnostic Test Results (Optional):092934}        Signed Electronically by: Chandler Hancock PA-C  {Email feedback regarding this note to [email or web address]   :125283}     "communicated with patient             Review of Systems  Constitutional, neuro, ENT, endocrine, pulmonary, cardiac, gastrointestinal, genitourinary, musculoskeletal, integument and psychiatric systems are negative, except as otherwise noted.        Objective    /79   Pulse 96   Temp 98.7  F (37.1  C) (Tympanic)   Resp 16   Ht 1.676 m (5' 6\")   Wt 69.9 kg (154 lb)   SpO2 98%   BMI 24.86 kg/m    Body mass index is 24.86 kg/m .      Physical Exam   GENERAL: alert and no distress  HENT: ear canals and TM's normal, nose and mouth without ulcers or lesions  NECK: no adenopathy, no asymmetry, masses, or scars  RESP: lungs clear to auscultation - no rales, rhonchi or wheezes  CV: regular rate and rhythm, normal S1 S2, no S3 or S4, no murmur, click or rub, no peripheral edema        Signed Electronically by: Chandler Hancock PA-C      "

## 2024-12-02 ENCOUNTER — OFFICE VISIT (OUTPATIENT)
Dept: FAMILY MEDICINE | Facility: CLINIC | Age: 74
End: 2024-12-02
Payer: COMMERCIAL

## 2024-12-02 VITALS
RESPIRATION RATE: 16 BRPM | TEMPERATURE: 98.7 F | DIASTOLIC BLOOD PRESSURE: 79 MMHG | WEIGHT: 154 LBS | BODY MASS INDEX: 24.75 KG/M2 | OXYGEN SATURATION: 98 % | SYSTOLIC BLOOD PRESSURE: 130 MMHG | HEIGHT: 66 IN | HEART RATE: 96 BPM

## 2024-12-02 DIAGNOSIS — Z63.9 RELATIONSHIP PROBLEMS: ICD-10-CM

## 2024-12-02 DIAGNOSIS — J18.9 PNEUMONIA OF BOTH LOWER LOBES DUE TO INFECTIOUS ORGANISM: ICD-10-CM

## 2024-12-02 DIAGNOSIS — Z09 HOSPITAL DISCHARGE FOLLOW-UP: Primary | ICD-10-CM

## 2024-12-02 PROCEDURE — 99213 OFFICE O/P EST LOW 20 MIN: CPT | Performed by: PHYSICIAN ASSISTANT

## 2024-12-02 SDOH — SOCIAL STABILITY - SOCIAL INSECURITY: PROBLEM RELATED TO PRIMARY SUPPORT GROUP, UNSPECIFIED: Z63.9

## 2024-12-02 ASSESSMENT — PAIN SCALES - GENERAL: PAINLEVEL_OUTOF10: NO PAIN (0)

## 2024-12-21 DIAGNOSIS — I10 HYPERTENSION GOAL BP (BLOOD PRESSURE) < 140/90: ICD-10-CM

## 2024-12-21 DIAGNOSIS — N40.1 BENIGN PROSTATIC HYPERPLASIA WITH URINARY FREQUENCY: ICD-10-CM

## 2024-12-21 DIAGNOSIS — R35.0 BENIGN PROSTATIC HYPERPLASIA WITH URINARY FREQUENCY: ICD-10-CM

## 2024-12-23 RX ORDER — LOSARTAN POTASSIUM 25 MG/1
25 TABLET ORAL EVERY MORNING
Qty: 90 TABLET | Refills: 0 | Status: SHIPPED | OUTPATIENT
Start: 2024-12-23

## 2024-12-23 RX ORDER — CARVEDILOL 3.12 MG/1
3.12 TABLET ORAL 2 TIMES DAILY WITH MEALS
Qty: 180 TABLET | Refills: 2 | Status: SHIPPED | OUTPATIENT
Start: 2024-12-23

## 2024-12-23 RX ORDER — TAMSULOSIN HYDROCHLORIDE 0.4 MG/1
0.4 CAPSULE ORAL DAILY
Qty: 90 CAPSULE | Refills: 2 | Status: SHIPPED | OUTPATIENT
Start: 2024-12-23

## 2025-02-12 ENCOUNTER — MYC MEDICAL ADVICE (OUTPATIENT)
Dept: FAMILY MEDICINE | Facility: CLINIC | Age: 75
End: 2025-02-12

## 2025-03-19 ENCOUNTER — NURSE TRIAGE (OUTPATIENT)
Dept: FAMILY MEDICINE | Facility: CLINIC | Age: 75
End: 2025-03-19
Payer: COMMERCIAL

## 2025-03-19 NOTE — TELEPHONE ENCOUNTER
S-(situation): Patient and his wife calling in about the patient having diarrhea for about 1 week.      B-(background): Was in [place] about 1 month ago.  Had Traveler's Diarrhea then.  Got better and then it recurred about 1 week ago.      A-(assessment): Patient and wife note the patient has been having this diarrhea and abdominal cramping for about a week.  Patient does have a history of diverticuli.  Patient denies any vomiting.  He also notes that he will have waves of worsening symptoms.  He is also taking OTC medications like Pepto Bismal and Imodium without any luck.  Patient also states there is yellow mucus in the stool along with yellow diarrhea.      R-(recommendations): Go to [facility]/UC/ED.  Tried to urge patient to go to [facility] to get hydration if needed.  Patient refused.He stated he may go to Urgent Care.  Discussed with him locations and what services are offered.  Patient verbalized he would go there.  Discussed when to call back, patient verbalized understanding.        Kristina Kjellberg, MSN, RN    Reason for Disposition   Constant abdominal pain lasting > 2 hours    Additional Information   Negative: Shock suspected (e.g., cold/pale/clammy skin, too weak to stand, low BP, rapid pulse)   Negative: Difficult to awaken or acting confused (e.g., disoriented, slurred speech)   Negative: Sounds like a life-threatening emergency to the triager   Negative: Vomiting also present and worse than the diarrhea   Negative: Blood in stool and without diarrhea   Negative: Diarrhea begins while taking an antibiotic by mouth (oral antibiotic)   Negative: SEVERE abdominal pain (e.g., excruciating) and present > 1 hour   Negative: SEVERE abdominal pain and age > 60 years   Negative: Bloody, black, or tarry bowel movements  (Exception: Chronic-unchanged black-grey bowel movements and is taking iron pills or Pepto-Bismol.)   Negative: SEVERE diarrhea (e.g., 7 or more times / day more than normal) and age > 60  "years    Answer Assessment - Initial Assessment Questions  1. DIARRHEA SEVERITY: \"How bad is the diarrhea?\" \"How many more stools have you had in the past 24 hours than normal?\"       5x more than normal   2. ONSET: \"When did the diarrhea begin?\"       1 week ago, varying intensity  3. STOOL DESCRIPTION:  \"How loose or watery is the diarrhea?\" \"What is the stool color?\" \"Is there any blood or mucous in the stool?\"      Very watery, yellow mucus and yellow diarrhea, No blood  4. VOMITING: \"Are you also vomiting?\" If Yes, ask: \"How many times in the past 24 hours?\"       No  5. ABDOMEN PAIN: \"Are you having any abdomen pain?\" If Yes, ask: \"What does it feel like?\" (e.g., crampy, dull, intermittent, constant)       Nausea, cramping abdominal severe right now  6. ABDOMEN PAIN SEVERITY: If present, ask: \"How bad is the pain?\"  (e.g., Scale 1-10; mild, moderate, or severe)      Severe right now  7. ORAL INTAKE: If vomiting, \"Have you been able to drink liquids?\" \"How much liquids have you had in the past 24 hours?\"      Yes, 16 oz.    8. HYDRATION: \"Any signs of dehydration?\" (e.g., dry mouth [not just dry lips], too weak to stand, dizziness, new weight loss) \"When did you last urinate?\"      Lost a couple of pounds,   9. EXPOSURE: \"Have you traveled to a foreign country recently?\" \"Have you been exposed to anyone with diarrhea?\" \"Could you have eaten any food that was spoiled?\"      Last traveled to [place] Feb 9  10. ANTIBIOTIC USE: \"Are you taking antibiotics now or have you taken antibiotics in the past 2 months?\"        No  11. OTHER SYMPTOMS: \"Do you have any other symptoms?\" (e.g., fever, blood in stool)        No  12. PREGNANCY: \"Is there any chance you are pregnant?\" \"When was your last menstrual period?\"        NA    Protocols used: Diarrhea-A-OH    "

## 2025-03-24 NOTE — PROGRESS NOTES
{PROVIDER CHARTING PREFERENCE:139429}    Subjective   César is a 74 year old, presenting for the following health issues:  Diarrhea  {(!) Visit Details have not yet been documented.  Please enter Visit Details and then use this list to pull in documentation. (Optional):293039}  HPI      {MA/LPN/RN Pre-Provider Visit Orders- hCG/UA/Strep (Optional):231235}  {SUPERLIST (Optional):782791}  {additonal problems for provider to add (Optional):646819}    {ROS Picklists (Optional):063250}      Objective    There were no vitals taken for this visit.  There is no height or weight on file to calculate BMI.  Physical Exam   {Exam List (Optional):051837}    {Diagnostic Test Results (Optional):709832}        Signed Electronically by: Chandler Hancock PA-C  {Email feedback regarding this note to [email or web address]   :696331}     and rhythm, normal S1 S2, no S3 or S4, no murmur, click or rub, no peripheral edema  ABDOMEN: soft, nontender, no hepatosplenomegaly, no masses and bowel sounds normal        Signed Electronically by: Chandler Hancock PA-C

## 2025-03-29 DIAGNOSIS — I10 HYPERTENSION GOAL BP (BLOOD PRESSURE) < 140/90: ICD-10-CM

## 2025-03-31 ENCOUNTER — OFFICE VISIT (OUTPATIENT)
Dept: FAMILY MEDICINE | Facility: CLINIC | Age: 75
End: 2025-03-31
Payer: COMMERCIAL

## 2025-03-31 VITALS
HEART RATE: 72 BPM | HEIGHT: 66 IN | SYSTOLIC BLOOD PRESSURE: 126 MMHG | TEMPERATURE: 97.3 F | RESPIRATION RATE: 16 BRPM | WEIGHT: 164 LBS | DIASTOLIC BLOOD PRESSURE: 75 MMHG | OXYGEN SATURATION: 99 % | BODY MASS INDEX: 26.36 KG/M2

## 2025-03-31 DIAGNOSIS — A02.0 SALMONELLA GASTROENTERITIS: ICD-10-CM

## 2025-03-31 DIAGNOSIS — R19.7 DIARRHEA, UNSPECIFIED TYPE: Primary | ICD-10-CM

## 2025-03-31 DIAGNOSIS — C85.98 NON-HODGKIN LYMPHOMA OF LYMPH NODES OF MULTIPLE SITES (H): ICD-10-CM

## 2025-03-31 DIAGNOSIS — E11.22 TYPE 2 DIABETES MELLITUS WITH CHRONIC KIDNEY DISEASE, WITHOUT LONG-TERM CURRENT USE OF INSULIN, UNSPECIFIED CKD STAGE (H): ICD-10-CM

## 2025-03-31 LAB

## 2025-03-31 PROCEDURE — 3074F SYST BP LT 130 MM HG: CPT | Performed by: PHYSICIAN ASSISTANT

## 2025-03-31 PROCEDURE — 3078F DIAST BP <80 MM HG: CPT | Performed by: PHYSICIAN ASSISTANT

## 2025-03-31 PROCEDURE — 1126F AMNT PAIN NOTED NONE PRSNT: CPT | Performed by: PHYSICIAN ASSISTANT

## 2025-03-31 PROCEDURE — G2211 COMPLEX E/M VISIT ADD ON: HCPCS | Performed by: PHYSICIAN ASSISTANT

## 2025-03-31 PROCEDURE — 87507 IADNA-DNA/RNA PROBE TQ 12-25: CPT | Mod: GZ | Performed by: PHYSICIAN ASSISTANT

## 2025-03-31 PROCEDURE — 99214 OFFICE O/P EST MOD 30 MIN: CPT | Performed by: PHYSICIAN ASSISTANT

## 2025-03-31 RX ORDER — LOSARTAN POTASSIUM 25 MG/1
25 TABLET ORAL EVERY MORNING
Qty: 90 TABLET | Refills: 0 | Status: SHIPPED | OUTPATIENT
Start: 2025-03-31

## 2025-03-31 ASSESSMENT — PAIN SCALES - GENERAL: PAINLEVEL_OUTOF10: NO PAIN (0)

## 2025-04-01 ENCOUNTER — MYC MEDICAL ADVICE (OUTPATIENT)
Dept: FAMILY MEDICINE | Facility: CLINIC | Age: 75
End: 2025-04-01
Payer: COMMERCIAL

## 2025-04-01 RX ORDER — CIPROFLOXACIN 500 MG/1
500 TABLET, FILM COATED ORAL 2 TIMES DAILY
Qty: 6 TABLET | Refills: 0 | Status: SHIPPED | OUTPATIENT
Start: 2025-04-01 | End: 2025-04-04

## 2025-04-08 LAB

## 2025-04-17 ENCOUNTER — MYC MEDICAL ADVICE (OUTPATIENT)
Dept: FAMILY MEDICINE | Facility: CLINIC | Age: 75
End: 2025-04-17
Payer: COMMERCIAL

## 2025-04-17 NOTE — TELEPHONE ENCOUNTER
Tested + salmonella 3/31/25 and treated with cipro 500 mg twice daily x 3 days. The loose stools improved but now coming back.     Routing to provider to advise.  Katerin Avina BSN, RN

## 2025-04-19 ENCOUNTER — OFFICE VISIT (OUTPATIENT)
Dept: URGENT CARE | Facility: URGENT CARE | Age: 75
End: 2025-04-19
Payer: COMMERCIAL

## 2025-04-19 VITALS
RESPIRATION RATE: 22 BRPM | HEART RATE: 142 BPM | TEMPERATURE: 98.8 F | DIASTOLIC BLOOD PRESSURE: 90 MMHG | WEIGHT: 158 LBS | BODY MASS INDEX: 25.5 KG/M2 | OXYGEN SATURATION: 99 % | SYSTOLIC BLOOD PRESSURE: 132 MMHG

## 2025-04-19 DIAGNOSIS — A02.9 SALMONELLA INFECTION: Primary | ICD-10-CM

## 2025-04-19 DIAGNOSIS — E86.0 DEHYDRATION: ICD-10-CM

## 2025-04-19 PROCEDURE — 99214 OFFICE O/P EST MOD 30 MIN: CPT | Performed by: STUDENT IN AN ORGANIZED HEALTH CARE EDUCATION/TRAINING PROGRAM

## 2025-04-19 PROCEDURE — 3080F DIAST BP >= 90 MM HG: CPT | Performed by: STUDENT IN AN ORGANIZED HEALTH CARE EDUCATION/TRAINING PROGRAM

## 2025-04-19 PROCEDURE — 3075F SYST BP GE 130 - 139MM HG: CPT | Performed by: STUDENT IN AN ORGANIZED HEALTH CARE EDUCATION/TRAINING PROGRAM

## 2025-04-19 NOTE — PROGRESS NOTES
Urgent Care Clinic Visit    Chief Complaint   Patient presents with    Diarrhea     Diarrhea and cramping   Was on cipro already   Taking amoxicillin from [place] for a few days  Can't eat or drink  Weight loss   Now having blood in stool since late last night   Does not want cipro again- wants to be admitted to [place]               4/19/2025     1:01 PM   Additional Questions   Roomed by Hayde   Accompanied by Wife Ana Rosa

## 2025-04-19 NOTE — PROGRESS NOTES
Assessment & Plan     Salmonella infection  Dehydration  César was seen by me in urgent care for persistent diarrhea now with blood in the stool. He is tachycardic with heart rate of 142. He has been unable to eat the past 24 hours due to new symptoms of nausea and no appetite. He was treated with Cipro 4/1 twice daily for 3 days.  He took a dose of amoxicillin yesterday from a supply from [place] 500 mg total of 4 doses last dose this morning.  The diarrhea felt better for about 24 hours after the amoxicillin but now is feeling worse.     History of lymphoma since 2012. Has been on Rituxan since 2012 and has been in remission. Now has new lymph nodes that appear abnormal and is scheduled for biopsy to assess for recurrence.     I am primary concerned about dehydration with the tachycardia and need for IVFs.      30 minutes spent by me on the date of the encounter doing chart review, review of test results, patient visit, documentation, and discussion with family       No follow-ups on file.    Adwoa Rojas, CHENG CNP  [facility] [place]    Subjective     César is a 74 year old male who presents to clinic today for the following health issues:  Chief Complaint   Patient presents with    Diarrhea     Diarrhea and cramping   Was on cipro already   Taking amoxicillin from [place] for a few days  Can't eat or drink  Weight loss   Now having blood in stool since late last night   Does not want cipro again- wants to be admitted to [place]          4/19/2025     1:01 PM   Additional Questions   Roomed by Hayde   Accompanied by Wife Ana Rosa     HPI      Antispasmodic    Review of Systems  Constitutional, HEENT, cardiovascular, pulmonary, GI, , musculoskeletal, neuro, skin, endocrine and psych systems are negative, except as otherwise noted.      Objective    /90   Pulse (!) 142   Temp 98.8  F (37.1  C) (Oral)   Resp 22   Wt 71.7 kg (158 lb)   SpO2 99%   BMI 25.50 kg/m    Physical Exam    GENERAL: alert and no distress  EYES: Eyes grossly normal to inspection, PERRL and conjunctivae and sclerae normal  RESP: lungs clear to auscultation - no rales, rhonchi or wheezes  CV: tachycardia, normal S1 S2, no S3 or S4, and no murmur, click or rub  MS: no gross musculoskeletal defects noted, no edema  SKIN: no suspicious lesions or rashes  NEURO: Normal strength and tone, mentation intact and speech normal  PSYCH: mentation appears normal, affect normal/bright    Status: Edited Result - FINAL       Visible to patient: Yes (seen)       Dx: Diarrhea, unspecified type    Specimen Information: Per Rectum; Stool   1 Result Note       1 Follow-up Encounter         Component  Ref Range & Units 2 wk ago    Campylobacter species  Negative Negative    Salmonella species  Negative Positive Abnormal     Vibrio species  Negative Negative    Vibrio cholerae  Negative Negative    Yersinia enterocolitica  Negative Negative    Enteropathogenic E. coli (EPEC)  Negative, NA Negative    Shiga-like toxin-producing E. coli (STEC)  Negative Negative    Shigella/Enteroinvasive E. coli (EIEC)  Negative Negative    Cryptosporidium species  Negative Negative    Giardia lamblia  Negative Negative    Norovirus Gl/Gll  Negative Negative    Rotavirus A  Negative Negative    Plesiomonas shigelloides  Negative Negative    Enteroaggregative E. coli (EAEC)  Negative Negative    Enterotoxigenic E. coli (ETEC)  Negative Negative    E. coli O157  Negative, NA NA    Cyclospora cayetanensis  Negative Negative    Entamoeba histolytica  Negative Negative    Adenovirus F40/41  Negative Negative    Astrovirus  Negative Negative    Sapovirus  Negative Negative   Resulting Agency [facility]              Narrative  Performed by: [facility]  Assay performed using the FDA-cleared FilmArray GI Panel from [employer]  A negative result should not rule out infection in patients with a probability for gastrointestinal infection. The assay does not test for  all potential infectious agents of diarrheal disease.  Positive results do not distinguish between a viable or replicating organism and the presence of a nonviable organism or nucleic acid, nor do they exclude the possibility of coinfection by organisms not in the panel.  Results are intended to aid in the diagnosis of illness and are meant to be used in conjunction with other clinical findings.  This test has been verified and is performed by the [facility] at [facility]. This laboratory is certified under the Clinical Laboratory Improvement Amendments of 1988 (CLIA-88) as qualified to perform high complexity clinical laboratory testing.   Specimen Collected: 03/31/25  1:02 PM Last Resulted: 04/08/25  9:36 AM

## 2025-04-19 NOTE — PATIENT INSTRUCTIONS
César was seen by me in urgent care for persistent diarrhea now with blood in the stool. He is tachycardic with heart rate of 142. He has been unable to eat the past 24 hours due to new symptoms of nausea and no appetite. He was treated with Cipro 4/1 twice daily for 3 days.  He took a dose of amoxicillin yesterday from a supply from [place] 500 mg total of 4 doses last dose this morning.  The diarrhea felt better for about 24 hours after the amoxicillin but now is feeling worse.     History of lymphoma since 2012. Has been on Rituxan since 2012 and has been in remission. Now has new lymph nodes that appear abnormal and is scheduled for biopsy to assess for recurrence.     I am primary concerned about dehydration with the tachycardia and need for IVFs.     Monica Rojas, CNP

## 2025-04-23 NOTE — PROGRESS NOTES
{PROVIDER CHARTING PREFERENCE:900086}    Subjective   César is a 74 year old, presenting for the following health issues:  Hospital F/U  {(!) Visit Details have not yet been documented.  Please enter Visit Details and then use this list to pull in documentation. (Optional):110006}  HPI      {MA/LPN/RN Pre-Provider Visit Orders- hCG/UA/Strep (Optional):000074}  {SUPERLIST (Optional):822108}  {additonal problems for provider to add (Optional):189561}    {ROS Picklists (Optional):119232}      Objective    There were no vitals taken for this visit.  There is no height or weight on file to calculate BMI.  Physical Exam   {Exam List (Optional):531965}    {Diagnostic Test Results (Optional):263676}        Signed Electronically by: Chandler Hancock PA-C  {Email feedback regarding this note to [email or web address]   :002794}

## 2025-04-25 ENCOUNTER — OFFICE VISIT (OUTPATIENT)
Dept: FAMILY MEDICINE | Facility: CLINIC | Age: 75
End: 2025-04-25
Payer: COMMERCIAL

## 2025-04-25 DIAGNOSIS — E11.22 TYPE 2 DIABETES MELLITUS WITH CHRONIC KIDNEY DISEASE, WITHOUT LONG-TERM CURRENT USE OF INSULIN, UNSPECIFIED CKD STAGE (H): Primary | ICD-10-CM

## 2025-04-28 NOTE — PROGRESS NOTES
"  Assessment & Plan     Hospital discharge follow-up  4/19-4/21/25 stay for salmonella colitis.  Presented after UC visit showing elevated HR with concern for hypovolemia. Promptly treated with fluid resuscitation and placed on IV cipro and flagyl and transitioned to oral cipro x 14 days and flagyl 7 days.  We had previously treated with cipro for 3 days with clinical resolution before recurrence of symptoms.  CT abdomen was obtained during stay which confirmed colitis.  Of note termial ileitis was present, possible IBD was seen although I think more likely related to the infectious source although we will continue to monitor.  Able to discharge feeling well and has not had recurrence of symptoms    Colitis due to Salmonella species  - Basic metabolic panel  (Ca, Cl, CO2, Creat, Gluc, K, Na, BUN); Future  - Enteric Bacteria and Virus Panel by [facility] Stool; Future  Patient states that he had BMP followed up with oncology which was normal. We will have him send those results as I am unable to obtain them today during our office visit.  I do not suspect significant worsening of CKD as came back to baseline after resolution of hypovolemia 2/2 diarrhea    Stool culture ordered for follow up 4-6 weeks     Stage 3a chronic kidney disease (H)  As above         MED REC REQUIRED  Post Medication Reconciliation Status:  Discharge medications reconciled, continue medications without change  BMI  Estimated body mass index is 26.31 kg/m  as calculated from the following:    Height as of this encounter: 1.676 m (5' 6\").    Weight as of this encounter: 73.9 kg (163 lb).         Follow up if worsening symptoms with pcp or myself     Subjective   César is a 74 year old, presenting for the following health issues:  Hospital F/U        4/30/2025     9:13 AM   Additional Questions   Roomed by Brittni Elkins Ma   Accompanied by Self     HPI          Hospital Follow-up Visit:    Hospital/Nursing Home/IP Rehab Facility:  [facility]  Date of " "Admission: 04/19/2025  Date of Discharge: 04/21/2025  Reason(s) for Admission: Colitis SX  Was the patient in the ICU or did the patient experience delirium during hospitalization?  No  Do you have any other stressors you would like to discuss with your provider? No    Problems taking medications regularly:  None  Medication changes since discharge: None  Problems adhering to non-medication therapy:  None    Summary of hospitalization:  CareEverywhere information obtained and reviewed  Diagnostic Tests/Treatments reviewed.  Follow up needed: See note  Other Healthcare Providers Involved in Patient s Care:         None  Update since discharge: improved.         Plan of care communicated with patient                   Objective    /71   Pulse 73   Temp 97.3  F (36.3  C) (Tympanic)   Resp 16   Ht 1.676 m (5' 6\")   Wt 73.9 kg (163 lb)   SpO2 100%   BMI 26.31 kg/m    Body mass index is 26.31 kg/m .  Physical Exam   GENERAL: alert and no distress  EYES: Eyes grossly normal to inspection, PERRL and conjunctivae and sclerae normal  CV: regular rate and rhythm, normal S1 S2, no S3 or S4, no murmur, click or rub, no peripheral edema  ABDOMEN: soft, nontender, no hepatosplenomegaly, no masses and bowel sounds normal  PSYCH: mentation appears normal, affect normal/bright            Signed Electronically by: Chandler Hancock PA-C      "

## 2025-04-30 ENCOUNTER — OFFICE VISIT (OUTPATIENT)
Dept: FAMILY MEDICINE | Facility: CLINIC | Age: 75
End: 2025-04-30
Payer: COMMERCIAL

## 2025-04-30 VITALS
WEIGHT: 163 LBS | RESPIRATION RATE: 16 BRPM | HEIGHT: 66 IN | DIASTOLIC BLOOD PRESSURE: 71 MMHG | OXYGEN SATURATION: 100 % | HEART RATE: 73 BPM | TEMPERATURE: 97.3 F | BODY MASS INDEX: 26.2 KG/M2 | SYSTOLIC BLOOD PRESSURE: 120 MMHG

## 2025-04-30 DIAGNOSIS — A02.0 COLITIS DUE TO SALMONELLA SPECIES: ICD-10-CM

## 2025-04-30 DIAGNOSIS — Z09 HOSPITAL DISCHARGE FOLLOW-UP: Primary | ICD-10-CM

## 2025-04-30 DIAGNOSIS — N18.31 STAGE 3A CHRONIC KIDNEY DISEASE (H): ICD-10-CM

## 2025-04-30 ASSESSMENT — PAIN SCALES - GENERAL: PAINLEVEL_OUTOF10: NO PAIN (0)

## 2025-05-05 ENCOUNTER — TELEPHONE (OUTPATIENT)
Dept: FAMILY MEDICINE | Facility: CLINIC | Age: 75
End: 2025-05-05
Payer: COMMERCIAL

## 2025-05-05 DIAGNOSIS — Z91.030 BEE STING ALLERGY: Primary | ICD-10-CM

## 2025-05-05 NOTE — TELEPHONE ENCOUNTER
[facility] pharmacy calling regarding the order for adrenaline.   They say it needs to be ordered as an Epi pen and not adrenaline.     This RN sees where the PA for this medication was denied 15 mins ago.     Routing to PA team to advise if PA will be covered if it is ordered as an Epi-pen instead. What is reason for denial?      Katerin MARCUSN, RN

## 2025-05-07 NOTE — TELEPHONE ENCOUNTER
PRIOR AUTHORIZATION DENIED    Medication: EPINEPHRINE 1 MG/ML [facility]  [employer]: [facility] [place] - Phone [phone] Fax [phone]  Denial Date: 5/5/2025  Denial Reason(s):       Appeal Information:       Patient Notified: NO

## 2025-05-12 NOTE — TELEPHONE ENCOUNTER
Routing to provider - RN unable to send prescription as it is a new prescription. Medication/Pharm pended, if appropriate.     Thank you - Celena Henley, AMRITN, RN

## 2025-05-13 RX ORDER — EPINEPHRINE 0.3 MG/.3ML
0.3 INJECTION SUBCUTANEOUS PRN
Qty: 2 EACH | Refills: 1 | Status: SHIPPED | OUTPATIENT
Start: 2025-05-13

## 2025-05-17 ENCOUNTER — HEALTH MAINTENANCE LETTER (OUTPATIENT)
Age: 75
End: 2025-05-17

## 2025-05-27 LAB

## 2025-05-29 ENCOUNTER — PATIENT OUTREACH (OUTPATIENT)
Dept: CARE COORDINATION | Facility: CLINIC | Age: 75
End: 2025-05-29
Payer: COMMERCIAL

## 2025-05-29 ENCOUNTER — RESULTS FOLLOW-UP (OUTPATIENT)
Dept: FAMILY MEDICINE | Facility: CLINIC | Age: 75
End: 2025-05-29

## 2025-05-29 NOTE — PROGRESS NOTES
Clinical Product Navigator RN reviewed chart; patient on payer product coverage.  Review results:   CPN Initial Information Gathering  Referral Source: Health Plan  Referrals Places: Care Coordination    Patient identified by Health Plan as a candidate for Primary Care Coordination due to the below open gaps in care/concerns identified by Health Plan:     !Clinical Complexity: Cost - Emerging: ETG severity trending higher;   !Clinical Complexity: IP Risk - Emerging: Moderate IP stay probability;   !Clinical Complexity: Utilization - High condition-specific utilization (clusters); L  ack of follow-up lab tests or procedures; Missing office visit;   SDOH - Family & Support Group Factors;   Chronic condition and BH condition;   SDoH Economic Concerns or Financial Insecurity;   SDoH Access to care concerns or transportation insecurity    Primary Care Coordination program opened for patient outreach and assessment of above open gaps in care/concerns and to offer ongoing support.     Juanita Gill RN   Clinical Product Navigator   [email or web address]   Office: [phone]

## 2025-06-18 DIAGNOSIS — I10 HYPERTENSION GOAL BP (BLOOD PRESSURE) < 140/90: ICD-10-CM

## 2025-06-19 RX ORDER — LOSARTAN POTASSIUM 25 MG/1
25 TABLET ORAL EVERY MORNING
Qty: 90 TABLET | Refills: 0 | Status: SHIPPED | OUTPATIENT
Start: 2025-06-19

## 2025-06-24 NOTE — PATIENT INSTRUCTIONS
Proper skin care from [facility]:    -Eliminate harsh soaps as they strip the natural oils from the skin, often resulting in dry itchy skin ( i.e. Dial, Zest, Jordanian Spring)  -Use mild soaps such as Cetaphil or Dove Sensitive Skin in the shower. You do not need to use soap on arms, legs, and trunk every time you shower unless visibly soiled.   -Avoid hot or cold showers.  -After showering, lightly dry off and apply moisturizing within 2-3 minutes. This will help trap moisture in the skin.   -Aggressive use of a moisturizer at least 1-2 times a day to the entire body (including -Vanicream, Cetaphil, Aquaphor or Cerave) and moisturize hands after every washing.  -We recommend using moisturizers that come in a tub that needs to be scooped out, not a pump. This has more of an oil base. It will hold moisture in your skin much better than a water base moisturizer. The above recommended are non-pore clogging.      Wear a sunscreen with at least SPF 30 on your face, ears, neck and V of the chest daily. Wear sunscreen on other areas of the body if those areas are exposed to the sun throughout the day. Sunscreens can contain physical and/or chemical blockers. Physical blockers are less likely to clog pores, these include zinc oxide and titanium dioxide. Reapply every two hour and after swimming.     Sunscreen examples: https://www.ewg.org/sunscreen/    UV radiation  UVA radiation remains constant throughout the day and throughout the year. It is a longer wavelength than UVB and therefore penetrates deeper into the skin leading to immediate and delayed tanning, photoaging, and skin cancer. 70-80% of UVA and UVB radiation occurs between the hours of 10am-2pm.  UVB radiation  UVB radiation causes the most harmful effects and is more significant during the summer months. However, snow and ice can reflect UVB radiation leading to skin damage during the winter months as well. UVB radiation is responsible for tanning,  burning, inflammation, delayed erythema (pinkness), pigmentation (brown spots), and skin cancer.     I recommend self monthly full body exams and yearly full body exams with a dermatology provider. If you develop a new or changing lesion please follow up for examination. Most skin cancers are pink and scaly or pink and pearly. However, we do see blue/brown/black skin cancers.  Consider the ABCDEs of melanoma when giving yourself your monthly full body exam ( don't forget the groin, buttocks, feet, toes, etc). A-asymmetry, B-borders, C-color, D-diameter, E-elevation or evolving. If you see any of these changes please follow up in clinic. If you cannot see your back I recommend purchasing a hand held mirror to use with a larger wall mirror.       Checking for Skin Cancer  You can find cancer early by checking your skin each month. There are 3 kinds of skin cancer. They are melanoma, basal cell carcinoma, and squamous cell carcinoma. Doing monthly skin checks is the best way to find new marks or skin changes. Follow the instructions below for checking your skin.   The ABCDEs of checking moles for melanoma   Check your moles or growths for signs of melanoma using ABCDE:   Asymmetry: the sides of the mole or growth don t match  Border: the edges are ragged, notched, or blurred  Color: the color within the mole or growth varies  Diameter: the mole or growth is larger than 6 mm (size of a pencil eraser)  Evolving: the size, shape, or color of the mole or growth is changing (evolving is not shown in the images below)    Checking for other types of skin cancer  Basal cell carcinoma or squamous cell carcinoma have symptoms such as:     A spot or mole that looks different from all other marks on your skin  Changes in how an area feels, such as itching, tenderness, or pain  Changes in the skin's surface, such as oozing, bleeding, or scaliness  A sore that does not heal  New swelling or redness beyond the border of a  mole    Who s at risk?  Anyone can get skin cancer. But you are at greater risk if you have:   Fair skin, light-colored hair, or light-colored eyes  Many moles or abnormal moles on your skin  A history of sunburns from sunlight or tanning beds  A family history of skin cancer  A history of exposure to radiation or chemicals  A weakened immune system  If you have had skin cancer in the past, you are at risk for recurring skin cancer.   How to check your skin  Do your monthly skin checkups in front of a full-length mirror. Check all parts of your body, including your:   Head (ears, face, neck, and scalp)  Torso (front, back, and sides)  Arms (tops, undersides, upper, and lower armpits)  Hands (palms, backs, and fingers, including under the nails)  Buttocks and genitals  Legs (front, back, and sides)  Feet (tops, soles, toes, including under the nails, and between toes)  If you have a lot of moles, take digital photos of them each month. Make sure to take photos both up close and from a distance. These can help you see if any moles change over time.   Most skin changes are not cancer. But if you see any changes in your skin, call your doctor right away. Only he or she can diagnose a problem. If you have skin cancer, seeing your doctor can be the first step toward getting the treatment that could save your life.   [employer] last reviewed this educational content on 4/1/2019 2000-2020 The "[employer]. [address] [place] [address], [place] [address]. All rights reserved. This information is not intended as a substitute for professional medical care. Always follow your healthcare professional's instructions.       When should I call my doctor?  If you are worsening or not improving, please, contact us or seek urgent care as noted below.     Who should I call with questions (adults)?    [facility] [phone]  For urgent needs outside of business hours call the [facility] at  [phone] and ask for the dermatology resident on call to be paged  If this is a medical emergency and you are unable to reach an ER, Call 911      If you need a prescription refill, please contact your pharmacy. Refills are approved or denied by our Physicians during normal business hours, Monday through Friday.  Per office policy, refills will not be granted if you have not been seen within the past year (or sooner depending on the condition).

## 2025-06-26 ENCOUNTER — OFFICE VISIT (OUTPATIENT)
Dept: DERMATOLOGY | Facility: CLINIC | Age: 75
End: 2025-06-26
Attending: FAMILY MEDICINE
Payer: COMMERCIAL

## 2025-06-26 DIAGNOSIS — L81.4 LENTIGINES: ICD-10-CM

## 2025-06-26 DIAGNOSIS — Z12.83 SKIN EXAM, SCREENING FOR CANCER: ICD-10-CM

## 2025-06-26 DIAGNOSIS — D22.9 MULTIPLE BENIGN NEVI: Primary | ICD-10-CM

## 2025-06-26 DIAGNOSIS — Z12.83 ENCOUNTER FOR SCREENING FOR MALIGNANT NEOPLASM OF SKIN: ICD-10-CM

## 2025-06-26 DIAGNOSIS — L82.1 SEBORRHEIC KERATOSES: ICD-10-CM

## 2025-06-26 DIAGNOSIS — D18.01 CHERRY ANGIOMA: ICD-10-CM

## 2025-06-26 NOTE — LETTER
6/26/2025      César Denis  [address] [place] [address]      Dear Colleague,    Thank you for referring your patient, César Denis, to the [facility]. Please see a copy of my visit note below.    [facility] D[facility]ology Note  Encounter Date: Jun 26, 2025  Office Visit     Reviewed patients past medical history and pertinent chart review prior to patients visit today.     Dermatology Problem List:  Has had lymphoma since 2012, patient states is on palliative care     - Patient denies personal history of skin cancer or dysplastic nevi.    - Patient denies family history of skin cancer or dysplastic nevi.       ____________________________________________    Assessment & Plan:     # Benign skin findings including: seborrheic keratoses, cherry angioma, lentigines and benign nevi.   - No further intervention required. Patient to report changes.   - Patient reassured of the benign nature of these lesions.    #Signs and Symptoms of non-melanoma skin cancer and ABCDEs of melanoma reviewed with patient. Patient encouraged to perform monthly self skin exams and educated on how to perform them. UV precautions reviewed with patient. Patient was asked about new or changing moles/lesions on body.     #Reviewed Sunscreen: Apply 20 minutes prior to going outdoors and reapply every two hours, when wet or sweating. We recommend using an SPF 30 or higher, and to use one that is water resistant.       Follow-up:  2-3 years for follow up full body skin exam, prn for new or changing lesions or new concerns    Monica Martin CNP  Dermatology     ____________________________________________    CC: Skin Check    HPI:  Mr. César Denis is a(n) 74 year old male who presents today as a new patient for a full body skin cancer screening. Patient has no specific concerns today.     Patient is otherwise feeling well, without additional skin concerns.     Physical Exam:  SKIN: Total skin  excluding the genitalia areas was performed. The exam included the head/face, neck, both arms, chest, back, abdomen, both legs, digits, mons pubis, buttock and nails.   -several 1-2mm red dome shaped symmetric papules scattered on the trunk  -multiple tan/brown flat round macules and raised papules scattered throughout trunk, extremities and head. No worrisome features for malignancy noted on examination.  -scattered tan, homogenous macules scattered on sun exposed areas of trunk, extremities and face.   -scattered waxy, stuck on tan/brown papules and patches on the trunk scalp face and extremities    - No other lesions of concern on areas examined.     Medications:  Current Outpatient Medications   Medication Sig Dispense Refill     carvedilol (COREG) 3.125 MG tablet TAKE 1 TABLET BY MOUTH TWICE DAILY WITH MEALS 180 tablet 2     cetirizine (ZYRTEC) 10 MG CHEW Take 10 mg by mouth daily       DEXAMETHASONE IO        EPINEPHrine (ANY BX GENERIC EQUIV) 0.3 MG/0.3ML injection 2-pack Inject 0.3 mLs (0.3 mg) into the muscle as needed for anaphylaxis. May repeat one time in 5-15 minutes if response to initial dose is inadequate. 2 each 1     fluticasone (FLONASE) 50 MCG/ACT nasal spray Spray 1 spray into both nostrils daily       losartan (COZAAR) 25 MG tablet TAKE 1 TABLET BY MOUTH IN THE MORNING 90 tablet 0     metFORMIN (GLUCOPHAGE XR) 500 MG 24 hr tablet Take 1 tablet (500 mg) by mouth daily (with dinner). 90 tablet 3     pravastatin (PRAVACHOL) 40 MG tablet Take 1 tablet (40 mg) by mouth at bedtime. 90 tablet 2     raxibacumab 50 mg/mL Inject into the vein once       tamsulosin (FLOMAX) 0.4 MG capsule Take 1 capsule (0.4 mg) by mouth daily. 90 capsule 2     VENTOLIN  (90 Base) MCG/ACT inhaler INHALE 2 PUFFS BY MOUTH 4 TIMES DAILY 18 g 0     No current facility-administered medications for this visit.      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    Again, thank you for allowing me to participate in the care of your patient.        Sincerely,        CHENG Whitehead CNP    Electronically signed

## 2025-06-26 NOTE — PROGRESS NOTES
[facility] Dermatology Note  Encounter Date: Jun 26, 2025  Office Visit     Reviewed patients past medical history and pertinent chart review prior to patients visit today.     Dermatology Problem List:  Has had lymphoma since 2012, patient states is on palliative care     - Patient denies personal history of skin cancer or dysplastic nevi.    - Patient denies family history of skin cancer or dysplastic nevi.       ____________________________________________    Assessment & Plan:     # Benign skin findings including: seborrheic keratoses, cherry angioma, lentigines and benign nevi.   - No further intervention required. Patient to report changes.   - Patient reassured of the benign nature of these lesions.    #Signs and Symptoms of non-melanoma skin cancer and ABCDEs of melanoma reviewed with patient. Patient encouraged to perform monthly self skin exams and educated on how to perform them. UV precautions reviewed with patient. Patient was asked about new or changing moles/lesions on body.     #Reviewed Sunscreen: Apply 20 minutes prior to going outdoors and reapply every two hours, when wet or sweating. We recommend using an SPF 30 or higher, and to use one that is water resistant.       Follow-up:  2-3 years for follow up full body skin exam, prn for new or changing lesions or new concerns    Monica Martin CNP  Dermatology     ____________________________________________    CC: Skin Check    HPI:  Mr. César Denis is a(n) 74 year old male who presents today as a new patient for a full body skin cancer screening. Patient has no specific concerns today.     Patient is otherwise feeling well, without additional skin concerns.     Physical Exam:  SKIN: Total skin excluding the genitalia areas was performed. The exam included the head/face, neck, both arms, chest, back, abdomen, both legs, digits, mons pubis, buttock and nails.   -several 1-2mm red dome shaped symmetric papules scattered on the  trunk  -multiple tan/brown flat round macules and raised papules scattered throughout trunk, extremities and head. No worrisome features for malignancy noted on examination.  -scattered tan, homogenous macules scattered on sun exposed areas of trunk, extremities and face.   -scattered waxy, stuck on tan/brown papules and patches on the trunk scalp face and extremities    - No other lesions of concern on areas examined.     Medications:  Current Outpatient Medications   Medication Sig Dispense Refill    carvedilol (COREG) 3.125 MG tablet TAKE 1 TABLET BY MOUTH TWICE DAILY WITH MEALS 180 tablet 2    cetirizine (ZYRTEC) 10 MG CHEW Take 10 mg by mouth daily      DEXAMETHASONE IO       EPINEPHrine (ANY BX GENERIC EQUIV) 0.3 MG/0.3ML injection 2-pack Inject 0.3 mLs (0.3 mg) into the muscle as needed for anaphylaxis. May repeat one time in 5-15 minutes if response to initial dose is inadequate. 2 each 1    fluticasone (FLONASE) 50 MCG/ACT nasal spray Spray 1 spray into both nostrils daily      losartan (COZAAR) 25 MG tablet TAKE 1 TABLET BY MOUTH IN THE MORNING 90 tablet 0    metFORMIN (GLUCOPHAGE XR) 500 MG 24 hr tablet Take 1 tablet (500 mg) by mouth daily (with dinner). 90 tablet 3    pravastatin (PRAVACHOL) 40 MG tablet Take 1 tablet (40 mg) by mouth at bedtime. 90 tablet 2    raxibacumab 50 mg/mL Inject into the vein once      tamsulosin (FLOMAX) 0.4 MG capsule Take 1 capsule (0.4 mg) by mouth daily. 90 capsule 2    VENTOLIN  (90 Base) MCG/ACT inhaler INHALE 2 PUFFS BY MOUTH 4 TIMES DAILY 18 g 0     No current facility-administered medications for this visit.      Past Medical History:   Patient Active Problem List   Diagnosis    Type 2 diabetes mellitus with chronic kidney disease, without long-term current use of insulin, unspecified CKD stage (H)    Hypertension goal BP (blood pressure) < 140/90    Non-Hodgkin's lymphoma (H)    CKD (chronic kidney disease) stage 3, GFR 30-59 ml/min (H)    Non-follicular  lymphoma of lymph nodes of multiple regions, unspecified non-follicular lymphoma type (H)    Other male erectile dysfunction     Past Medical History:   Diagnosis Date    Cancer (H) non hodgkins lymphoma    since 2012    Diabetes (H)     Hypertension        CC Eden Ferguson MD  [address] [id number] on close of this encounter.

## 2025-08-25 DIAGNOSIS — E78.00 ELEVATED LDL CHOLESTEROL LEVEL: ICD-10-CM

## 2025-08-26 RX ORDER — PRAVASTATIN SODIUM 40 MG
40 TABLET ORAL AT BEDTIME
Qty: 90 TABLET | Refills: 3 | Status: SHIPPED | OUTPATIENT
Start: 2025-08-26